# Patient Record
Sex: MALE | Race: WHITE | NOT HISPANIC OR LATINO | Employment: FULL TIME | URBAN - METROPOLITAN AREA
[De-identification: names, ages, dates, MRNs, and addresses within clinical notes are randomized per-mention and may not be internally consistent; named-entity substitution may affect disease eponyms.]

---

## 2023-06-21 PROBLEM — K52.832 FOCAL LYMPHOCYTIC COLITIS: Status: ACTIVE | Noted: 2023-06-21

## 2023-06-21 RX ORDER — HYOSCYAMINE SULFATE 0.12 MG/1
TABLET, ORALLY DISINTEGRATING ORAL
COMMUNITY
Start: 2021-09-08 | End: 2023-06-22

## 2023-06-21 RX ORDER — CHOLESTYRAMINE 4 G/9G
POWDER, FOR SUSPENSION ORAL 2 TIMES DAILY
Status: ON HOLD | COMMUNITY
Start: 2021-09-08 | End: 2023-12-04 | Stop reason: WASHOUT

## 2023-06-22 ENCOUNTER — OFFICE VISIT (OUTPATIENT)
Dept: PRIMARY CARE | Facility: CLINIC | Age: 68
End: 2023-06-22
Payer: COMMERCIAL

## 2023-06-22 VITALS
DIASTOLIC BLOOD PRESSURE: 72 MMHG | BODY MASS INDEX: 28.5 KG/M2 | WEIGHT: 210.13 LBS | HEART RATE: 54 BPM | TEMPERATURE: 97.7 F | SYSTOLIC BLOOD PRESSURE: 122 MMHG

## 2023-06-22 DIAGNOSIS — K52.832 FOCAL LYMPHOCYTIC COLITIS: ICD-10-CM

## 2023-06-22 DIAGNOSIS — K90.0 CELIAC DISEASE (HHS-HCC): ICD-10-CM

## 2023-06-22 DIAGNOSIS — E78.5 DYSLIPIDEMIA: Primary | ICD-10-CM

## 2023-06-22 DIAGNOSIS — Z01.84 IMMUNITY STATUS TESTING: ICD-10-CM

## 2023-06-22 PROCEDURE — 90471 IMMUNIZATION ADMIN: CPT | Performed by: INTERNAL MEDICINE

## 2023-06-22 PROCEDURE — 1160F RVW MEDS BY RX/DR IN RCRD: CPT | Performed by: INTERNAL MEDICINE

## 2023-06-22 PROCEDURE — 99214 OFFICE O/P EST MOD 30 MIN: CPT | Performed by: INTERNAL MEDICINE

## 2023-06-22 PROCEDURE — 90677 PCV20 VACCINE IM: CPT | Performed by: INTERNAL MEDICINE

## 2023-06-22 PROCEDURE — 1036F TOBACCO NON-USER: CPT | Performed by: INTERNAL MEDICINE

## 2023-06-22 PROCEDURE — 1159F MED LIST DOCD IN RCRD: CPT | Performed by: INTERNAL MEDICINE

## 2023-06-22 NOTE — ASSESSMENT & PLAN NOTE
Newly diagnosed, mostly adherent to gluten free diet. Encouraged adherence. Recommend iron level on next set of labs. Vitamin D levels wnl at last check.

## 2023-06-22 NOTE — PROGRESS NOTES
Subjective   Patient ID: Jones Howell is a 67 y.o. male who presents for NEW PT VISIT .  HPI  67-year-old male former patient of Dr. Akbar here for establishment of care,, last seen May 2022.    Has no significant complaints today.   PMHx:  -Microscopic colitis without improvement on mesalamine recommended trial of cholestyramine at last visit with improvement in symptoms.   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 3 children , lost son 2 years ago unexpectedly. No need for support.   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  Current Outpatient Medications   Medication Instructions    cholestyramine (Questran) 4 gram packet oral, 2 times daily        Objective     /72   Pulse 54   Temp 36.5 °C (97.7 °F)   Wt 95.3 kg (210 lb 2 oz)   BMI 28.50 kg/m²     Physical Exam  General: Alert and oriented, in no apparent distress   HEENT: No conjunctival erythema, no external facial lesions   Lungs: Breathing comfortably  Skin: No evidence of skin breakdown.  Neuro: AAO x 3, answering questions appropriately, no obvious cranial nerve deficits       Assessment/Plan   Problem List Items Addressed This Visit    None  Visit Diagnoses       Dyslipidemia    -  Primary  Extensively discussed, elevated risk mainly due to age. Interested in further risk stratification will obtain coronary artery calcium score     Relevant Orders    CT cardiac scoring wo IV contrast    Immunity status testing        Relevant Orders    Pneumococcal conjugate vaccine, 20-valent, adult (PREVNAR 20)        Assessment/Plan   Problem List Items Addressed  This Visit       Focal lymphocytic colitis    Current Assessment & Plan     Followed by GI at [facility], maintained on cholestyramine with improvement in symptoms.          Celiac disease    Current Assessment & Plan     Newly diagnosed, mostly adherent to gluten free diet. Encouraged adherence. Recommend iron level on next set of labs. Vitamin D levels wnl at last check.                 Health Maintenance   Cancer screening:   - Colonoscopy 9/17 repeat 10 years  - PSA 2/23   Immunizations  Prevnar 20 today, otherwise UTD   Followup 6 months for preventive care visit

## 2023-06-22 NOTE — ASSESSMENT & PLAN NOTE
Followed by GI at [facility], maintained on cholestyramine with improvement in symptoms.

## 2023-06-22 NOTE — PATIENT INSTRUCTIONS
It was a pleasure to see you today! Here is a list of things we have discussed and to follow up on:    Cholesterol - we are obtaining a coronary artery calcium score.   Followup in 6 months for a preventive care visit.

## 2023-10-11 PROBLEM — R07.89 ATYPICAL CHEST PAIN: Status: ACTIVE | Noted: 2023-10-11

## 2023-10-11 PROBLEM — H81.10 BENIGN POSITIONAL VERTIGO: Status: ACTIVE | Noted: 2023-10-11

## 2023-10-11 PROBLEM — D22.62 MELANOCYTIC NEVI OF LEFT UPPER LIMB, INCLUDING SHOULDER: Status: ACTIVE | Noted: 2018-05-15

## 2023-10-11 PROBLEM — D22.71 MELANOCYTIC NEVI OF RIGHT LOWER LIMB, INCLUDING HIP: Status: ACTIVE | Noted: 2018-05-15

## 2023-10-11 PROBLEM — L57.0 ACTINIC KERATOSIS: Status: ACTIVE | Noted: 2018-05-15

## 2023-10-11 PROBLEM — L98.9 SKIN LESION: Status: ACTIVE | Noted: 2023-10-11

## 2023-10-11 PROBLEM — E55.9 VITAMIN D DEFICIENCY: Status: ACTIVE | Noted: 2023-10-11

## 2023-10-11 PROBLEM — L81.9 HYPERPIGMENTED SKIN LESION: Status: ACTIVE | Noted: 2023-10-11

## 2023-10-11 PROBLEM — D22.39 MELANOCYTIC NEVI OF OTHER PARTS OF FACE: Status: ACTIVE | Noted: 2018-05-15

## 2023-10-11 PROBLEM — R42 VERTIGO: Status: ACTIVE | Noted: 2023-10-11

## 2023-10-11 PROBLEM — L81.4 OTHER MELANIN HYPERPIGMENTATION: Status: ACTIVE | Noted: 2018-05-15

## 2023-10-11 PROBLEM — E73.9 LACTOSE INTOLERANCE: Status: ACTIVE | Noted: 2023-02-06

## 2023-10-11 PROBLEM — D18.01 HEMANGIOMA OF SKIN AND SUBCUTANEOUS TISSUE: Status: ACTIVE | Noted: 2018-05-15

## 2023-10-11 PROBLEM — R19.5 LOOSE STOOLS: Status: ACTIVE | Noted: 2023-10-11

## 2023-10-11 PROBLEM — L82.1 OTHER SEBORRHEIC KERATOSIS: Status: ACTIVE | Noted: 2018-05-15

## 2023-10-11 PROBLEM — D22.5 MELANOCYTIC NEVI OF TRUNK: Status: ACTIVE | Noted: 2018-05-15

## 2023-10-11 PROBLEM — D22.72 MELANOCYTIC NEVI OF LEFT LOWER LIMB, INCLUDING HIP: Status: ACTIVE | Noted: 2018-05-15

## 2023-10-11 PROBLEM — N52.9 ED (ERECTILE DYSFUNCTION) OF ORGANIC ORIGIN: Status: ACTIVE | Noted: 2023-02-06

## 2023-10-11 PROBLEM — D23.71 OTHER BENIGN NEOPLASM OF SKIN OF RIGHT LOWER LIMB, INCLUDING HIP: Status: ACTIVE | Noted: 2018-05-15

## 2023-10-11 PROBLEM — H01.009 BLEPHARITIS: Status: ACTIVE | Noted: 2023-10-11

## 2023-10-11 PROBLEM — L57.9 SKIN CHANGES DUE TO CHRONIC EXPOSURE TO NONIONIZING RADIATION, UNSPECIFIED: Status: ACTIVE | Noted: 2018-05-15

## 2023-10-11 PROBLEM — K21.9 GERD (GASTROESOPHAGEAL REFLUX DISEASE): Status: ACTIVE | Noted: 2023-10-11

## 2023-10-11 PROBLEM — L23.7 CONTACT DERMATITIS DUE TO POISON IVY: Status: ACTIVE | Noted: 2023-10-11

## 2023-10-11 PROBLEM — D22.61 MELANOCYTIC NEVI OF RIGHT UPPER LIMB, INCLUDING SHOULDER: Status: ACTIVE | Noted: 2018-05-15

## 2023-10-11 PROBLEM — M54.30 SCIATICA: Status: ACTIVE | Noted: 2023-10-11

## 2023-10-11 RX ORDER — NEOMYCIN SULFATE, POLYMYXIN B SULFATE, BACITRACIN ZINC, HYDROCORTISONE 3.5; 10000; 400; 1 MG/G; [USP'U]/G; [USP'U]/G; MG/G
OINTMENT OPHTHALMIC 4 TIMES DAILY
COMMUNITY
Start: 2022-05-17 | End: 2023-11-13 | Stop reason: ALTCHOICE

## 2023-10-11 RX ORDER — MESALAMINE 1.2 G/1
2 TABLET, DELAYED RELEASE ORAL DAILY
COMMUNITY
Start: 2017-10-11 | End: 2023-11-13 | Stop reason: ALTCHOICE

## 2023-10-11 RX ORDER — SILDENAFIL 25 MG/1
1 TABLET, FILM COATED ORAL AS NEEDED
COMMUNITY
Start: 2023-02-06 | End: 2023-11-13 | Stop reason: ALTCHOICE

## 2023-10-11 RX ORDER — SODIUM, POTASSIUM,MAG SULFATES 17.5-3.13G
SOLUTION, RECONSTITUTED, ORAL ORAL
COMMUNITY
Start: 2022-10-31 | End: 2023-11-13 | Stop reason: ALTCHOICE

## 2023-10-12 ENCOUNTER — OFFICE VISIT (OUTPATIENT)
Dept: DERMATOLOGY | Facility: CLINIC | Age: 68
End: 2023-10-12
Payer: COMMERCIAL

## 2023-10-12 DIAGNOSIS — D48.5 NEOPLASM OF UNCERTAIN BEHAVIOR OF SKIN: Primary | ICD-10-CM

## 2023-10-12 DIAGNOSIS — L57.8 DIFFUSE PHOTODAMAGE OF SKIN: ICD-10-CM

## 2023-10-12 DIAGNOSIS — D22.5 MELANOCYTIC NEVUS OF TRUNK: ICD-10-CM

## 2023-10-12 DIAGNOSIS — D18.01 HEMANGIOMA OF SKIN: ICD-10-CM

## 2023-10-12 DIAGNOSIS — L73.9 FOLLICULITIS: ICD-10-CM

## 2023-10-12 DIAGNOSIS — L57.0 ACTINIC KERATOSIS: ICD-10-CM

## 2023-10-12 PROCEDURE — 1160F RVW MEDS BY RX/DR IN RCRD: CPT | Performed by: DERMATOLOGY

## 2023-10-12 PROCEDURE — 88305 TISSUE EXAM BY PATHOLOGIST: CPT

## 2023-10-12 PROCEDURE — 11103 TANGNTL BX SKIN EA SEP/ADDL: CPT | Performed by: DERMATOLOGY

## 2023-10-12 PROCEDURE — 11302 SHAVE SKIN LESION 1.1-2.0 CM: CPT | Performed by: DERMATOLOGY

## 2023-10-12 PROCEDURE — 99204 OFFICE O/P NEW MOD 45 MIN: CPT | Performed by: DERMATOLOGY

## 2023-10-12 PROCEDURE — 1159F MED LIST DOCD IN RCRD: CPT | Performed by: DERMATOLOGY

## 2023-10-12 PROCEDURE — 88305 TISSUE EXAM BY PATHOLOGIST: CPT | Performed by: DERMATOLOGY

## 2023-10-12 PROCEDURE — 11102 TANGNTL BX SKIN SINGLE LES: CPT | Performed by: DERMATOLOGY

## 2023-10-12 PROCEDURE — 17004 DESTROY PREMAL LESIONS 15/>: CPT | Performed by: DERMATOLOGY

## 2023-10-12 PROCEDURE — 1036F TOBACCO NON-USER: CPT | Performed by: DERMATOLOGY

## 2023-10-12 NOTE — PROGRESS NOTES
Subjective     Jones Howell is a 68 y.o. male who presents for the following: Suspicious Skin Lesion (On face, including right temple, for many months).  He notes the spots on his face are red, scaly, and sensitive around his right temple.    Review of Systems:  No other skin or systemic complaints other than what is documented elsewhere in the note.    The following portions of the chart were reviewed this encounter and updated as appropriate:       Skin Cancer History  No skin cancer on file.    Specialty Problems          Dermatology Problems    Actinic keratosis    Hemangioma of skin and subcutaneous tissue    Melanocytic nevi of left lower limb, including hip    Melanocytic nevi of left upper limb, including shoulder    Melanocytic nevi of other parts of face    Melanocytic nevi of right lower limb, including hip    Melanocytic nevi of right upper limb, including shoulder    Melanocytic nevi of trunk    Other benign neoplasm of skin of right lower limb, including hip    Other melanin hyperpigmentation    Other seborrheic keratosis    Skin changes due to chronic exposure to nonionizing radiation, unspecified    Hyperpigmented skin lesion    Skin lesion       Past Dermatologic / Past Relevant Medical History:    No history of atypical nevi or skin cancer    Family History:    No family history of melanoma or skin cancer    Social History:    The patient states he works as a  and will be traveling to [place] this weekend    Allergies:  Fentanyl and Lactose    Current Medications / CAM's:    Current Outpatient Medications:     cholestyramine (Questran) 4 gram packet, Take by mouth twice a day., Disp: , Rfl:     mesalamine (Lialda) 1.2 gram EC tablet, Take 2 tablets (2.4 g) by mouth once daily., Disp: , Rfl:     NAPROXEN ORAL, Naproxen, Disp: , Rfl:     neomycin-bacitracin-polymyxin-hydrocortisone (Cortisporin) 3.5-400-10,000 mg-unit/g-1% ophthalmic ointment, Apply to affected eye(s) 4  times a day., Disp: , Rfl:     sildenafil (Viagra) 25 mg tablet, Take 1 tablet (25 mg) by mouth if needed., Disp: , Rfl:     sodium,potassium,mag sulfates (Suprep) 17.5-3.13-1.6 gram recon soln solution, Use as directed., Disp: , Rfl:      Objective   Well appearing patient in no apparent distress; mood and affect are within normal limits.    A full examination was performed including scalp, face, eyes, ears, nose, lips, neck, chest, axillae, abdomen, back, bilateral upper extremities, and bilateral lower extremities. All findings within normal limits unless otherwise noted below.    Assessment/Plan   1. Neoplasm of uncertain behavior of skin (3)  Right [place] superior  1 cm erythematous, scaly papule           Lesion biopsy  Type of biopsy: tangential    Informed consent: discussed and consent obtained    Timeout: patient name, date of birth, surgical site, and procedure verified    Procedure prep:  Patient was prepped and draped  Anesthesia: the lesion was anesthetized in a standard fashion    Anesthetic:  1% lidocaine w/ epinephrine 1-100,000 local infiltration  Instrument used: DermaBlade    Hemostasis achieved with: aluminum chloride    Outcome: patient tolerated procedure well    Post-procedure details: sterile dressing applied and wound care instructions given    Dressing type: petrolatum and bandage      Specimen 1 - Dermatopathology- DERM LAB  Differential Diagnosis: AK vs SCCIS  Check Margins Yes/No?:    Comments:    Dermpath Lab: Routine Histopathology (formalin-fixed tissue)    Right Temple inferior  1 cm erythematous, scaly papule     Lesion biopsy  Type of biopsy: tangential    Informed consent: discussed and consent obtained    Timeout: patient name, date of birth, surgical site, and procedure verified    Procedure prep:  Patient was prepped and draped  Anesthesia: the lesion was anesthetized in a standard fashion    Anesthetic:  1% lidocaine w/ epinephrine 1-100,000 local infiltration  Instrument  used: DermaBlade    Hemostasis achieved with: aluminum chloride    Outcome: patient tolerated procedure well    Post-procedure details: sterile dressing applied and wound care instructions given    Dressing type: petrolatum and bandage      Specimen 2 - Dermatopathology- DERM LAB  Differential Diagnosis: AK v SCCIS  Check Margins Yes/No?:    Comments:    Dermpath Lab: Routine Histopathology (formalin-fixed tissue)    Left Lower Back  7 mm dark brown pigmented, asymmetric macule with an asymmetric pigment network and irregular borders           Shave removal    Lesion length (cm):  0.7  Margin per side (cm):  0.2  Lesion diameter (cm):  1.1  Informed consent: discussed and consent obtained    Timeout: patient name, date of birth, surgical site, and procedure verified    Procedure prep:  Patient was prepped and draped  Anesthesia: the lesion was anesthetized in a standard fashion    Anesthetic:  1% lidocaine w/ epinephrine 1-100,000 local infiltration  Instrument used: flexible razor blade    Hemostasis achieved with: aluminum chloride    Outcome: patient tolerated procedure well    Post-procedure details: sterile dressing applied and wound care instructions given    Dressing type: bandage and petrolatum      Specimen 3 - Dermatopathology- DERM LAB  Differential Diagnosis: DN  Check Margins Yes/No?:    Comments:    Dermpath Lab: Routine Histopathology (formalin-fixed tissue)    2. Actinic keratosis (17)  Head - Anterior (Face) (17)  Scattered on the patient's face, there are multiple erythematous, gritty, scaly macules     Actinic Keratoses -scattered on face.  The pre-cancerous nature of these lesions and treatment options were discussed with the patient today.  At this time, I recommend treatment with liquid nitrogen cryotherapy.  The patient expressed understanding, is in agreement with this plan, and wishes to proceed with cryotherapy today.    Destr of lesion - Head - Anterior (Face)  Complexity: simple     Destruction method: cryotherapy    Informed consent: discussed and consent obtained    Lesion destroyed using liquid nitrogen: Yes    Cryotherapy cycles:  1  Outcome: patient tolerated procedure well with no complications    Post-procedure details: wound care instructions given      3. Melanocytic nevus of trunk  Scattered on the patient's face, neck, trunk, and extremities, there are multiple small, round- to oval-shaped, brown-pigmented and pink-colored, symmetric, uniform-appearing macules and dome-shaped papules    Clinically benign- to slightly atypical-appearing nevi - the clinically benign- to slightly atypical-appearing nature of the remainder of the patient's nevi was discussed with the patient today.  None of the patient's nevi, with the exception of the one noted above, meet threshold for biopsy today.  I emphasized the importance of performing monthly self-skin exams using the ABCDs of monitoring moles, which were reviewed with the patient today and an informational hand-out provided.  I also emphasized the importance of sun avoidance and sun protection with daily sunscreen use.    4. Hemangioma of skin  Scattered on the patient's face, neck, trunk, and extremities, there are multiple small, round, cherry red- to purplish-colored, symmetric, uniform, vascular-appearing macules and papules    Cherry Angiomas - the benign nature of these vascular lesions was discussed with the patient today and reassurance provided.  No treatment is medically indicated for these lesions at this time.    5. Folliculitis  Mid Back  Scattered on the patient's back and bilateral arms and forearms, there are several follicular-based erythematous, inflammatory papules and pustules    Folliculitis -back and bilateral arms and forearms.  The bacterial nature of this condition and treatment options were discussed with the patient today.  At this time, I recommend topical antibiotic therapy with Clindamycin 1% lotion, which the  patient was instructed to apply twice daily to the affected areas or up to 3-4 times per day as needed for active lesions.  The risks, benefits, and side effects of this medication were discussed.  The patient expressed understanding and is in agreement with this plan.    6. Diffuse photodamage of skin  Photodistributed  Diffuse photodamage with actinic changes with telangiectasia and mottled pigmentation in sun-exposed areas.    Photodamage.  The signs and symptoms of skin cancer were reviewed and the patient was advised to practice sun protection and sun avoidance, use daily sunscreen, and perform regular self skin exams.  Sun protection was discussed, including avoiding the mid-day sun, wearing a sunscreen with SPF at least 50, and stressing the need for reapplication of sunscreen and applying more than they think they need.

## 2023-10-13 DIAGNOSIS — K90.0 CELIAC DISEASE (HHS-HCC): ICD-10-CM

## 2023-10-13 DIAGNOSIS — Z00.00 ENCOUNTER FOR PREVENTATIVE ADULT HEALTH CARE EXAMINATION: Primary | ICD-10-CM

## 2023-10-13 DIAGNOSIS — Z12.5 PROSTATE CANCER SCREENING: ICD-10-CM

## 2023-10-13 DIAGNOSIS — I25.10 ATHEROSCLEROSIS OF CORONARY ARTERY OF NATIVE HEART WITHOUT ANGINA PECTORIS, UNSPECIFIED VESSEL OR LESION TYPE: ICD-10-CM

## 2023-10-13 RX ORDER — ROSUVASTATIN CALCIUM 10 MG/1
10 TABLET, COATED ORAL DAILY
Qty: 30 TABLET | Refills: 11 | Status: SHIPPED | OUTPATIENT
Start: 2023-10-13 | End: 2023-11-13 | Stop reason: ALTCHOICE

## 2023-10-13 NOTE — PROGRESS NOTES
Elevated CAC score with atherosclerosis noted on coronary artery calcium score with significant number of calcifications. Discussed with the patient extensively over the phone and recommendation to initiate cholesterol lowering medication for further risk reduction. Will obtain baseline bloodwork and initiate rosuvastatin at bedtime. Potential side effects and management expectations reviewed. He denies any symptoms of CAD.   Dilated pulmonary arteries noted - will obtain TTE for further evaluation.   Refer to cardiology.

## 2023-10-16 LAB
LABORATORY COMMENT REPORT: NORMAL
PATH REPORT.FINAL DX SPEC: NORMAL
PATH REPORT.GROSS SPEC: NORMAL
PATH REPORT.MICROSCOPIC SPEC OTHER STN: NORMAL
PATH REPORT.RELEVANT HX SPEC: NORMAL
PATH REPORT.TOTAL CANCER: NORMAL

## 2023-10-19 ENCOUNTER — LAB (OUTPATIENT)
Dept: LAB | Facility: LAB | Age: 68
End: 2023-10-19
Payer: COMMERCIAL

## 2023-10-19 DIAGNOSIS — Z12.5 PROSTATE CANCER SCREENING: ICD-10-CM

## 2023-10-19 DIAGNOSIS — K90.0 CELIAC DISEASE (HHS-HCC): ICD-10-CM

## 2023-10-19 DIAGNOSIS — Z00.00 ENCOUNTER FOR PREVENTATIVE ADULT HEALTH CARE EXAMINATION: ICD-10-CM

## 2023-10-19 LAB
25(OH)D3 SERPL-MCNC: 29 NG/ML (ref 30–100)
ALBUMIN SERPL BCP-MCNC: 4.7 G/DL (ref 3.4–5)
ALP SERPL-CCNC: 50 U/L (ref 33–136)
ALT SERPL W P-5'-P-CCNC: 13 U/L (ref 10–52)
ANION GAP SERPL CALC-SCNC: 13 MMOL/L (ref 10–20)
AST SERPL W P-5'-P-CCNC: 26 U/L (ref 9–39)
BASOPHILS # BLD AUTO: 0.04 X10*3/UL (ref 0–0.1)
BASOPHILS NFR BLD AUTO: 0.7 %
BILIRUB SERPL-MCNC: 0.8 MG/DL (ref 0–1.2)
BUN SERPL-MCNC: 14 MG/DL (ref 6–23)
CALCIUM SERPL-MCNC: 9.4 MG/DL (ref 8.6–10.6)
CHLORIDE SERPL-SCNC: 105 MMOL/L (ref 98–107)
CHOLEST SERPL-MCNC: 210 MG/DL (ref 0–199)
CHOLESTEROL/HDL RATIO: 3.3
CO2 SERPL-SCNC: 27 MMOL/L (ref 21–32)
CREAT SERPL-MCNC: 1.03 MG/DL (ref 0.5–1.3)
EOSINOPHIL # BLD AUTO: 0.11 X10*3/UL (ref 0–0.7)
EOSINOPHIL NFR BLD AUTO: 1.9 %
ERYTHROCYTE [DISTWIDTH] IN BLOOD BY AUTOMATED COUNT: 13.2 % (ref 11.5–14.5)
EST. AVERAGE GLUCOSE BLD GHB EST-MCNC: 105 MG/DL
FERRITIN SERPL-MCNC: 211 NG/ML (ref 20–300)
GFR SERPL CREATININE-BSD FRML MDRD: 80 ML/MIN/1.73M*2
GLUCOSE SERPL-MCNC: 90 MG/DL (ref 74–99)
HBA1C MFR BLD: 5.3 %
HCT VFR BLD AUTO: 40.6 % (ref 41–52)
HDLC SERPL-MCNC: 64.5 MG/DL
HGB BLD-MCNC: 12.8 G/DL (ref 13.5–17.5)
IMM GRANULOCYTES # BLD AUTO: 0 X10*3/UL (ref 0–0.7)
IMM GRANULOCYTES NFR BLD AUTO: 0 % (ref 0–0.9)
IRON SATN MFR SERPL: 25 % (ref 25–45)
IRON SERPL-MCNC: 87 UG/DL (ref 35–150)
LDLC SERPL CALC-MCNC: 129 MG/DL
LYMPHOCYTES # BLD AUTO: 2.28 X10*3/UL (ref 1.2–4.8)
LYMPHOCYTES NFR BLD AUTO: 39.3 %
MCH RBC QN AUTO: 29.1 PG (ref 26–34)
MCHC RBC AUTO-ENTMCNC: 31.5 G/DL (ref 32–36)
MCV RBC AUTO: 92 FL (ref 80–100)
MONOCYTES # BLD AUTO: 0.54 X10*3/UL (ref 0.1–1)
MONOCYTES NFR BLD AUTO: 9.3 %
NEUTROPHILS # BLD AUTO: 2.83 X10*3/UL (ref 1.2–7.7)
NEUTROPHILS NFR BLD AUTO: 48.8 %
NON HDL CHOLESTEROL: 146 MG/DL (ref 0–149)
NRBC BLD-RTO: 0 /100 WBCS (ref 0–0)
PLATELET # BLD AUTO: 244 X10*3/UL (ref 150–450)
PMV BLD AUTO: 10.5 FL (ref 7.5–11.5)
POTASSIUM SERPL-SCNC: 4.2 MMOL/L (ref 3.5–5.3)
PROT SERPL-MCNC: 7.2 G/DL (ref 6.4–8.2)
PSA SERPL-MCNC: 0.81 NG/ML
RBC # BLD AUTO: 4.4 X10*6/UL (ref 4.5–5.9)
SODIUM SERPL-SCNC: 141 MMOL/L (ref 136–145)
T4 FREE SERPL-MCNC: 0.95 NG/DL (ref 0.78–1.48)
TIBC SERPL-MCNC: 349 UG/DL (ref 240–445)
TRIGL SERPL-MCNC: 81 MG/DL (ref 0–149)
TSH SERPL-ACNC: 6.04 MIU/L (ref 0.44–3.98)
UIBC SERPL-MCNC: 262 UG/DL (ref 110–370)
VIT B12 SERPL-MCNC: 338 PG/ML (ref 211–911)
VLDL: 16 MG/DL (ref 0–40)
WBC # BLD AUTO: 5.8 X10*3/UL (ref 4.4–11.3)

## 2023-10-19 PROCEDURE — 83550 IRON BINDING TEST: CPT

## 2023-10-19 PROCEDURE — 82728 ASSAY OF FERRITIN: CPT

## 2023-10-19 PROCEDURE — 85025 COMPLETE CBC W/AUTO DIFF WBC: CPT

## 2023-10-19 PROCEDURE — 82306 VITAMIN D 25 HYDROXY: CPT

## 2023-10-19 PROCEDURE — 80053 COMPREHEN METABOLIC PANEL: CPT

## 2023-10-19 PROCEDURE — 84439 ASSAY OF FREE THYROXINE: CPT

## 2023-10-19 PROCEDURE — 84443 ASSAY THYROID STIM HORMONE: CPT

## 2023-10-19 PROCEDURE — 83540 ASSAY OF IRON: CPT

## 2023-10-19 PROCEDURE — 36415 COLL VENOUS BLD VENIPUNCTURE: CPT

## 2023-10-19 PROCEDURE — 83036 HEMOGLOBIN GLYCOSYLATED A1C: CPT

## 2023-10-19 PROCEDURE — 84153 ASSAY OF PSA TOTAL: CPT

## 2023-10-19 PROCEDURE — 80061 LIPID PANEL: CPT

## 2023-10-19 PROCEDURE — 82607 VITAMIN B-12: CPT

## 2023-11-08 ENCOUNTER — HOSPITAL ENCOUNTER (OUTPATIENT)
Dept: CARDIOLOGY | Facility: CLINIC | Age: 68
Discharge: HOME | End: 2023-11-08
Payer: COMMERCIAL

## 2023-11-08 DIAGNOSIS — I25.10 ATHEROSCLEROSIS OF CORONARY ARTERY OF NATIVE HEART WITHOUT ANGINA PECTORIS, UNSPECIFIED VESSEL OR LESION TYPE: ICD-10-CM

## 2023-11-08 LAB
AORTIC VALVE MEAN GRADIENT: 1.7
AORTIC VALVE PEAK VELOCITY: 0.89
AV PEAK GRADIENT: 3.2
AVA (PEAK VEL): 4.47
AVA (VTI): 4.35
EJECTION FRACTION APICAL 4 CHAMBER: 57.1
EJECTION FRACTION: 52
LEFT ATRIUM VOLUME AREA LENGTH INDEX BSA: 39.8
LEFT VENTRICLE INTERNAL DIMENSION DIASTOLE: 5.46 (ref 3.5–6)
LEFT VENTRICULAR OUTFLOW TRACT DIAMETER: 2.48
MITRAL VALVE E/A RATIO: 1.21
MITRAL VALVE E/E' RATIO: 10
RIGHT VENTRICLE FREE WALL PEAK S': 10
TRICUSPID ANNULAR PLANE SYSTOLIC EXCURSION: 2.6

## 2023-11-08 PROCEDURE — 93306 TTE W/DOPPLER COMPLETE: CPT

## 2023-11-08 PROCEDURE — 93306 TTE W/DOPPLER COMPLETE: CPT | Performed by: INTERNAL MEDICINE

## 2023-11-13 ENCOUNTER — OFFICE VISIT (OUTPATIENT)
Dept: CARDIOLOGY | Facility: HOSPITAL | Age: 68
End: 2023-11-13
Payer: COMMERCIAL

## 2023-11-13 ENCOUNTER — HOSPITAL ENCOUNTER (OUTPATIENT)
Dept: CARDIOLOGY | Facility: HOSPITAL | Age: 68
Discharge: HOME | End: 2023-11-13
Payer: COMMERCIAL

## 2023-11-13 VITALS
SYSTOLIC BLOOD PRESSURE: 125 MMHG | HEART RATE: 58 BPM | WEIGHT: 208 LBS | HEIGHT: 72 IN | DIASTOLIC BLOOD PRESSURE: 75 MMHG | BODY MASS INDEX: 28.17 KG/M2

## 2023-11-13 DIAGNOSIS — Z01.89 ENCOUNTER FOR OTHER SPECIFIED SPECIAL EXAMINATIONS: ICD-10-CM

## 2023-11-13 DIAGNOSIS — E78.5 DYSLIPIDEMIA, GOAL LDL BELOW 70: ICD-10-CM

## 2023-11-13 DIAGNOSIS — I25.119 CORONARY ARTERY DISEASE WITH ANGINA PECTORIS, UNSPECIFIED VESSEL OR LESION TYPE, UNSPECIFIED WHETHER NATIVE OR TRANSPLANTED HEART (CMS-HCC): ICD-10-CM

## 2023-11-13 DIAGNOSIS — I77.810 ASCENDING AORTA DILATATION (CMS-HCC): ICD-10-CM

## 2023-11-13 DIAGNOSIS — I25.10 ATHEROSCLEROSIS OF CORONARY ARTERY OF NATIVE HEART WITHOUT ANGINA PECTORIS, UNSPECIFIED VESSEL OR LESION TYPE: ICD-10-CM

## 2023-11-13 DIAGNOSIS — I51.9 LV DYSFUNCTION: ICD-10-CM

## 2023-11-13 DIAGNOSIS — I25.119 CORONARY ARTERY DISEASE WITH ANGINA PECTORIS, UNSPECIFIED VESSEL OR LESION TYPE, UNSPECIFIED WHETHER NATIVE OR TRANSPLANTED HEART (CMS-HCC): Primary | ICD-10-CM

## 2023-11-13 LAB
EJECTION FRACTION APICAL 4 CHAMBER: 52.4
EJECTION FRACTION: 53
LEFT VENTRICLE INTERNAL DIMENSION DIASTOLE: 5.2 (ref 3.5–6)
MITRAL VALVE E/A RATIO: 1.38
MITRAL VALVE E/E' RATIO: 7.1

## 2023-11-13 PROCEDURE — 1160F RVW MEDS BY RX/DR IN RCRD: CPT | Performed by: INTERNAL MEDICINE

## 2023-11-13 PROCEDURE — 93005 ELECTROCARDIOGRAM TRACING: CPT | Performed by: INTERNAL MEDICINE

## 2023-11-13 PROCEDURE — 2500000004 HC RX 250 GENERAL PHARMACY W/ HCPCS (ALT 636 FOR OP/ED): Performed by: INTERNAL MEDICINE

## 2023-11-13 PROCEDURE — 99214 OFFICE O/P EST MOD 30 MIN: CPT | Performed by: INTERNAL MEDICINE

## 2023-11-13 PROCEDURE — 1159F MED LIST DOCD IN RCRD: CPT | Performed by: INTERNAL MEDICINE

## 2023-11-13 PROCEDURE — 93321 DOPPLER ECHO F-UP/LMTD STD: CPT | Performed by: INTERNAL MEDICINE

## 2023-11-13 PROCEDURE — 93010 ELECTROCARDIOGRAM REPORT: CPT | Performed by: INTERNAL MEDICINE

## 2023-11-13 PROCEDURE — 93325 DOPPLER ECHO COLOR FLOW MAPG: CPT

## 2023-11-13 PROCEDURE — 93321 DOPPLER ECHO F-UP/LMTD STD: CPT

## 2023-11-13 PROCEDURE — 1036F TOBACCO NON-USER: CPT | Performed by: INTERNAL MEDICINE

## 2023-11-13 PROCEDURE — 93308 TTE F-UP OR LMTD: CPT

## 2023-11-13 PROCEDURE — 93308 TTE F-UP OR LMTD: CPT | Performed by: INTERNAL MEDICINE

## 2023-11-13 PROCEDURE — 93325 DOPPLER ECHO COLOR FLOW MAPG: CPT | Performed by: INTERNAL MEDICINE

## 2023-11-13 PROCEDURE — 99204 OFFICE O/P NEW MOD 45 MIN: CPT | Performed by: INTERNAL MEDICINE

## 2023-11-13 RX ORDER — ROSUVASTATIN CALCIUM 20 MG/1
20 TABLET, COATED ORAL DAILY
Qty: 90 TABLET | Refills: 3 | Status: SHIPPED | OUTPATIENT
Start: 2023-11-13 | End: 2024-11-12

## 2023-11-13 RX ORDER — LOSARTAN POTASSIUM 25 MG/1
25 TABLET ORAL DAILY
Qty: 30 TABLET | Refills: 11 | Status: SHIPPED | OUTPATIENT
Start: 2023-11-13 | End: 2024-01-11 | Stop reason: ALTCHOICE

## 2023-11-13 RX ORDER — ROSUVASTATIN CALCIUM 20 MG/1
20 TABLET, COATED ORAL DAILY
Qty: 90 TABLET | Refills: 3
Start: 2023-11-13 | End: 2023-11-13 | Stop reason: SDUPTHER

## 2023-11-13 RX ORDER — NAPROXEN SODIUM 220 MG/1
81 TABLET, FILM COATED ORAL DAILY
Qty: 90 TABLET | Refills: 3
Start: 2023-11-13 | End: 2024-11-12

## 2023-11-13 RX ORDER — NITROGLYCERIN 0.4 MG/1
0.4 TABLET SUBLINGUAL EVERY 5 MIN PRN
Qty: 100 TABLET | Refills: 11 | Status: SHIPPED | OUTPATIENT
Start: 2023-11-13 | End: 2024-01-09 | Stop reason: HOSPADM

## 2023-11-13 RX ADMIN — PERFLUTREN 2 ML OF DILUTION: 6.52 INJECTION, SUSPENSION INTRAVENOUS at 10:16

## 2023-11-13 ASSESSMENT — ENCOUNTER SYMPTOMS
DEPRESSION: 0
OCCASIONAL FEELINGS OF UNSTEADINESS: 0
LOSS OF SENSATION IN FEET: 0

## 2023-11-13 ASSESSMENT — PATIENT HEALTH QUESTIONNAIRE - PHQ9
1. LITTLE INTEREST OR PLEASURE IN DOING THINGS: NOT AT ALL
2. FEELING DOWN, DEPRESSED OR HOPELESS: NOT AT ALL
SUM OF ALL RESPONSES TO PHQ9 QUESTIONS 1 AND 2: 0

## 2023-11-13 NOTE — PROGRESS NOTES
Primary Care Physician: Shey Nguyễn DO      Date of Visit: 11/13/2023  8:00 AM EST  Location of visit: [facility]   Type of Visit: New Patient       HPI / Summary:   Jones Howell is a very pleasant 68 y.o. male presenting to establish cardiac care for elevated coronary artery calcium score    He has no significant past medical history except borderline lipids and had recent CT for CAC 6/22/2023: total 775.15:  LM =o,  .62,  Lcx 3.56,  .97. Ascending aorta 3.7 c,  also mildly dilated R PA and main PA and L PA, raising concern for possibly elevated PA pressures.   CAD risk facotrs: no Fhx, no HTN, borderline lipids, never smoked and no DM.   Pt is active running 3-4 miles every 3-4 days, bikes and walks dog for 40 minutes daily without CP or SOB. Pt denies palpitations presyncope or syncope, denies PND or orthopnea LE edema. No CP or SOB at rest or on exertion.   Approx 2 weeks ago started taking rosuvastatin 10 mg daily . No myalgias. Does note some knee pain. ( Is a runner). Also started taking baby aspirin.   Cqardiac echo done 11/8/2023: technically difficult study , but LVEF 35-40% with regional wall motion abnormalities, but recommended limited repeat with use of echo-contrast due to sub-optimal endocardial definition.        ROS: Full 10 pt review of symptoms of negative unless discussed above.     Problems:   Patient Active Problem List   Diagnosis    Focal lymphocytic colitis    Celiac disease    Anisometropia    Atypical chest pain    Benign positional vertigo    Blepharitis    Contact dermatitis due to poison ivy    ED (erectile dysfunction) of organic origin    GERD (gastroesophageal reflux disease)    Hemangioma of skin and subcutaneous tissue    Hyperpigmented skin lesion    Lactose intolerance    Melanocytic nevi of trunk    Melanocytic nevi of other parts of face    Loose stools    Nuclear sclerotic cataract of both eyes    Other benign neoplasm of skin of right lower  limb, including hip    Other melanin hyperpigmentation    Actinic keratosis    Other seborrheic keratosis    Skin changes due to chronic exposure to nonionizing radiation, unspecified    Pseudophakia of right eye    Sciatica    Skin lesion    Vertigo    Vitamin D deficiency    Melanocytic nevi of right upper limb, including shoulder    Melanocytic nevi of left upper limb, including shoulder    Melanocytic nevi of right lower limb, including hip    Melanocytic nevi of left lower limb, including hip     Medical History:   History reviewed. No pertinent past medical history.  Surgical Hx:   History reviewed. No pertinent surgical history.   Social Hx:   Tobacco Use: Low Risk  (11/13/2023)    Patient History     Smoking Tobacco Use: Never     Smokeless Tobacco Use: Never     Passive Exposure: Not on file     Alcohol Use: Not on file     Family Hx:   Family History   Problem Relation Name Age of Onset    Alzheimer's disease Mother  80    Other (COVID) Father  90      Exam:   Vitals:   Vitals:    11/13/23 0819   BP: 125/75   BP Location: Left arm   Patient Position: Sitting   BP Cuff Size: Adult   Pulse: 58   Weight: 94.3 kg (208 lb)   Height: 1.829 m (6')     Wt Readings from Last 5 Encounters:   11/13/23 94.3 kg (208 lb)   06/22/23 95.3 kg (210 lb 2 oz)   11/22/21 97.1 kg (214 lb)   04/07/21 97.5 kg (215 lb)      Constitutional:       Appearance: Healthy appearance. Not in distress.   Pulmonary:      Effort: Pulmonary effort is normal.      Breath sounds: Normal breath sounds.   Cardiovascular:      PMI at left midclavicular line. Bradycardia present. Regular rhythm. S1 with normal intensity. S2 with normal intensity.       Murmurs: There is no murmur.   Pulses:     Intact distal pulses.   Edema:     Peripheral edema absent.   Neurological:      Mental Status: Alert and oriented to person, place, and time.       Labs:   Recent Labs     10/19/23  0814 11/24/21  0836 08/05/20  0826 03/27/18  1042   WBC 5.8 5.9 5.4 6.4    HGB 12.8* 12.8* 13.1* 13.3*   HCT 40.6* 40.3* 40.8* 41.8    285 242 254   MCV 92 91 89 88     Recent Labs     10/19/23  0814 21  0836 20  0826 19  1222 18  1042    144 141 141 139   K 4.2 4.9 4.4 4.1 4.1    107 104 105 104   BUN 14 13 14 12 12   CREATININE 1.03 0.94 1.13 0.86 0.96      Recent Labs     10/19/23  0814   HGBA1C 5.3   FERRITIN 211   TIBC 349   IRONSAT 25     RESUFAST(CHOL:1,HDL:1,LDLF:1,TRI)  Lab Results   Component Value Date    CHOL 210 (H) 10/19/2023    HDL 64.5 10/19/2023    LDLF 122 (H) 2021    TRIG 81 10/19/2023   LDL 10/19/2023: 129    Notable Studies: imaging personally reviewed and summarized by me below  EKG:  EKG today  sinus bradycardia at 58 bpm otherwise normal EKG.     CT for CAC  2023:    LM 0,  .62,  LCx 3.56,  .97,     Total 775.15     The visualized ascending thoracic aorta measures 3.7 cm in diameter.  The heart is normal in size. No pericardial effusion is present.     No gross evidence of mediastinal or hilar lymphadenopathy or masses  is identified. The visualized segments of the lungs are normally  expanded.     The main pulmonary artery, right and left pulmonary artery are  dilated measuring 3.0, 2.6 and 2.4 cm respectively. Correlate with  elevated pulmonary pressures.     The visualized subdiaphragmatic structures appear intact.     IMPRESSION:  1. Coronary artery calcium score of 775*. Three-vessel coronary  artery atherosclerosis.  2. The main pulmonary artery, right and left pulmonary artery are  dilated measuring 3.0, 2.6 and 2.4 cm respectively. Correlate with  elevated pulmonary pressures.    Cardiac echo 2024  PHYSICIAN INTERPRETATION:  Left Ventricle: The left ventricular systolic function is mildly to moderately decreased, with an estimated ejection fraction of 35-40%. Wall motion is abnormal. The left ventricular cavity size is normal. Spectral Doppler shows an abnormal pattern of left  ventricular diastolic filling. Echoconrast would have been helpful to better assess the apex and trabeculations. Note that the apical views used to calculate LVEF are foreshortened which will overestimate the LVEF. Consider repeat limited echo with use of echocontrast vs cardiac MRI to better assess LVEF and apical trabeculations. Overall mild to moderate LV systolic dysfunction wtih an LAD territory wall motion abnormality.  LV Wall Scoring:  The mid and apical anterior septum and apex are akinetic. The basal and mid  inferior septum, apical lateral segment, apical anterior segment, and apical  inferior segment are hypokinetic.     Left Atrium: The left atrium is mildly dilated.  Right Ventricle: The right ventricle is mildly enlarged. There is low normal right ventricular systolic function. Trabeculated RV.  Right Atrium: The right atrium is mildly dilated.  Aortic Valve: The aortic valve is trileaflet. There is no evidence of aortic valve regurgitation. The peak instantaneous gradient of the aortic valve is 3.2 mmHg. The mean gradient of the aortic valve is 1.7 mmHg.  Mitral Valve: The mitral valve is normal in structure. There is mild mitral annular calcification. There is trace mitral valve regurgitation. There is some mild bowing of the anterior mitral leaflet without kandace prolapse and only trivial MR.  Tricuspid Valve: The tricuspid valve is structurally normal. There is trace tricuspid regurgitation. The right ventricular systolic pressure is unable to be estimated.  Pulmonic Valve: The pulmonic valve is not well visualized. There is physiologic pulmonic valve regurgitation.  Pericardium: There is no pericardial effusion noted.  Aorta: The aortic root is normal. The aortic root is at the upper limits of normal size.  Systemic Veins: The inferior vena cava appears dilated. There is IVC inspiratory collapse greater than 50%.  In comparison to the previous echocardiogram(s): There are no prior studies on this  patient for comparison purposes. No prior echocardiogram available for comparison.        CONCLUSIONS:   1. Left ventricular systolic function is mildly to moderately decreased with a 35-40% estimated ejection fraction.   2. Multiple segmental abnormalities exist. See findings.   3. Echoconrast would have been helpful to better assess the apex and trabeculations. Note that the apical views used to calculate LVEF are foreshortened which will overestimate the LVEF. Consider repeat limited echo with use of echocontrast vs cardiac MRI to better assess LVEF and apical trabeculations. Overall mild to moderate LV systolic dysfunction wtih an LAD territory wall motion abnormality.   4. Spectral Doppler shows an abnormal pattern of left ventricular diastolic filling.   5. There is low normal right ventricular systolic function.   6. No prior echocardiogram available for comparison.      Limited repeat echo today with use of echo-contrast still technically difficult with foreshortening of apical views. There is trabeculation of the LV apex, but the non-contrast images still with some LAD territory wall motion abnormality and possible inferior hypokinesis. Overall at least mild regional LV systolic dysfunction.     Current Outpatient Medications   Medication Instructions    aspirin 81 mg, oral, Daily    cholestyramine (Questran) 4 gram packet oral, 2 times daily    NAPROXEN ORAL Naproxen    rosuvastatin (CRESTOR) 20 mg, oral, Daily     Impressions and Plan:    Pt is a very active 68 year old man without significant past medical history found recently to have a high CAC ( 775.15 )( largest amount in the LAD) and a recent echocardiogram with  reduced LV systolic function with apparent hypokinesis in the LAD territory ( and possible inferior wall hypokinesis as well. ) today's limites exam with echo contrast with only mildly reduced LV systolic function. CT scan also with  mildly dilated aorta, but -pt has normal BP and baseline  mild bradycardia, so will not add beta blocker at this time. He recently started aspirin 81 mg daily and rosuvastatin 10 mg daily. He is tolerating this well, however given the above findings should be on a high intensity statin so his rosuvastatin was increased to 20 mg daily. He is very active running etc and remains totally asymptomatic, but given abnormal LV systolic function with high calcium score will assess his coronaries further with invasive angiography +/- PCI. Will give bottle of SL NTG to use as needed ( reviewed instructions  for use with patient). Will start with losartan for LV dysfunction and consider adding further medications as tolerated.   Plan  High CAC- continue aspirin and rosuvastatin increased to 20 mg daily. And will give rx for SL NTG.   Lipids- target LDL < 70 ( unless needs CABG or PCI in which case goal would be < 55) will recheck in 3 months  High CAC with reduced LV systolic function- invasive angiography. Will also start low dose losartan 25 mg daily given LV dysfunction. Will check BNP to look for signs of congestion. May consider adding SGLT2i +/- kat in the future.      Patient Instructions:  If you have any questions or need cardiac medication refills, please call my office at [phone],      To reach my office please call [phone]  To schedule an appointment call [phone].          ____________________________________________________________  Michelle Jimenez MD  Division of Cardiovascular Medicine  [facility] [place]  [facility]

## 2023-11-13 NOTE — PATIENT INSTRUCTIONS
High coronary artery calcium score ( 775) with reduced LV systolic function- will proceed with cardiac cath +/- PCI with Dr XANDER Ma. To continue aspirin 81 mg daily and will increase rosuvastatin to 20 mg daily ( high intensity dosing) for LDL target < 70. Pt may take 2 of his 10 mg tablets. Pt to have lipid panel rechecked in 3 months. Will give rx for SL nitroglycerine to use as needed ( instructed) for chest pain  Reduced LV systolic function on recent echo ( 35-40%)  though technically difficult study- will do limited echo with use of echo contrast to reassess LV function and look at regional wall motion. On repeat echo with echo-contrast possibly only mildly reduced LV function. Will add losartan 25 mg daily.   Mildly dilated aorta- BP appears normal and HR baseline 58 bpm. No room for additional beta blockers.   Will order precath labs  Return to clinic in  6 weeks.

## 2023-11-14 ENCOUNTER — PREP FOR PROCEDURE (OUTPATIENT)
Dept: CARDIOLOGY | Facility: HOSPITAL | Age: 68
End: 2023-11-14
Payer: COMMERCIAL

## 2023-11-14 DIAGNOSIS — I25.119 CORONARY ARTERY DISEASE WITH ANGINA PECTORIS, UNSPECIFIED VESSEL OR LESION TYPE, UNSPECIFIED WHETHER NATIVE OR TRANSPLANTED HEART (CMS-HCC): Primary | ICD-10-CM

## 2023-11-14 DIAGNOSIS — I51.9 LV DYSFUNCTION: ICD-10-CM

## 2023-11-14 LAB
ATRIAL RATE: 58 BPM
P AXIS: 67 DEGREES
P OFFSET: 186 MS
P ONSET: 125 MS
PR INTERVAL: 196 MS
Q ONSET: 223 MS
QRS COUNT: 10 BEATS
QRS DURATION: 82 MS
QT INTERVAL: 416 MS
QTC CALCULATION(BAZETT): 408 MS
QTC FREDERICIA: 411 MS
R AXIS: 71 DEGREES
T AXIS: 55 DEGREES
T OFFSET: 431 MS
VENTRICULAR RATE: 58 BPM

## 2023-11-14 RX ORDER — SODIUM CHLORIDE 9 MG/ML
100 INJECTION, SOLUTION INTRAVENOUS CONTINUOUS
Status: CANCELLED | OUTPATIENT
Start: 2023-11-14

## 2023-11-20 ENCOUNTER — OFFICE VISIT (OUTPATIENT)
Dept: DERMATOLOGY | Facility: CLINIC | Age: 68
End: 2023-11-20
Payer: COMMERCIAL

## 2023-11-20 DIAGNOSIS — D22.5 MELANOCYTIC NEVUS OF TRUNK: ICD-10-CM

## 2023-11-20 DIAGNOSIS — D48.5 NEOPLASM OF UNCERTAIN BEHAVIOR OF SKIN: ICD-10-CM

## 2023-11-20 DIAGNOSIS — L57.0 ACTINIC KERATOSIS: Primary | ICD-10-CM

## 2023-11-20 DIAGNOSIS — L81.4 LENTIGO: ICD-10-CM

## 2023-11-20 DIAGNOSIS — Z86.018 HISTORY OF DYSPLASTIC NEVUS: ICD-10-CM

## 2023-11-20 DIAGNOSIS — L82.1 SEBORRHEIC KERATOSIS: ICD-10-CM

## 2023-11-20 DIAGNOSIS — L82.0 INFLAMED SEBORRHEIC KERATOSIS: ICD-10-CM

## 2023-11-20 PROCEDURE — 88305 TISSUE EXAM BY PATHOLOGIST: CPT | Mod: TC,DER | Performed by: DERMATOLOGY

## 2023-11-20 PROCEDURE — 88305 TISSUE EXAM BY PATHOLOGIST: CPT | Performed by: DERMATOLOGY

## 2023-11-20 PROCEDURE — 11302 SHAVE SKIN LESION 1.1-2.0 CM: CPT | Performed by: DERMATOLOGY

## 2023-11-20 PROCEDURE — 1160F RVW MEDS BY RX/DR IN RCRD: CPT | Performed by: DERMATOLOGY

## 2023-11-20 PROCEDURE — 99213 OFFICE O/P EST LOW 20 MIN: CPT | Performed by: DERMATOLOGY

## 2023-11-20 PROCEDURE — 1159F MED LIST DOCD IN RCRD: CPT | Performed by: DERMATOLOGY

## 2023-11-20 PROCEDURE — 1036F TOBACCO NON-USER: CPT | Performed by: DERMATOLOGY

## 2023-11-20 PROCEDURE — 17004 DESTROY PREMAL LESIONS 15/>: CPT | Performed by: DERMATOLOGY

## 2023-11-20 PROCEDURE — 17110 DESTRUCTION B9 LES UP TO 14: CPT | Performed by: DERMATOLOGY

## 2023-11-20 RX ORDER — MULTIVITAMIN/IRON/FOLIC ACID 18MG-0.4MG
1 TABLET ORAL DAILY
COMMUNITY

## 2023-11-20 ASSESSMENT — DERMATOLOGY PATIENT ASSESSMENT
DO YOU USE SUNSCREEN: OCCASIONALLY
DO YOU HAVE ANY NEW OR CHANGING LESIONS: NO
DO YOU USE A TANNING BED: NO

## 2023-11-20 ASSESSMENT — ITCH NUMERIC RATING SCALE: HOW SEVERE IS YOUR ITCHING?: 0

## 2023-11-22 NOTE — PROGRESS NOTES
"Florentin Howell \"Samir\" is a 68 y.o. male who presents for the following: Discuss recent biopsy results and management options.  Biopsy of 3 suspicious lesions performed at his initial visit in our office on 10/12/23 revealed actinic keratoses on his right temple superior and right temple inferior and a moderately dysplastic compound nevus on his left lower back.    Today, the patient states the biopsy sites healed well.  Since his last visit, he notes a raised, scaly, itchy bump on his left upper forehead, which has not changed in any other way, including in size or color, and does not hurt or bleed.  He denies any other new, changing, symptomatic, or concerning skin lesions.      Review of Systems:  No other skin or systemic complaints other than what is documented elsewhere in the note.    The following portions of the chart were reviewed this encounter and updated as appropriate:       Skin Cancer History  No skin cancer on file.    Specialty Problems          Dermatology Problems    Actinic keratosis    Hemangioma of skin and subcutaneous tissue    Melanocytic nevi of left lower limb, including hip    Melanocytic nevi of left upper limb, including shoulder    Melanocytic nevi of other parts of face    Melanocytic nevi of right lower limb, including hip    Melanocytic nevi of right upper limb, including shoulder    Melanocytic nevi of trunk    Other benign neoplasm of skin of right lower limb, including hip    Other melanin hyperpigmentation    Other seborrheic keratosis    Skin changes due to chronic exposure to nonionizing radiation, unspecified    Hyperpigmented skin lesion    Skin lesion       Past Dermatologic / Past Relevant Medical History:    - history of AKs  - moderately dysplastic compound nevus on left lower back diagnosed on 10/12/23 s/p re-shave on 11/20/23  - no h/o skin cancer    Family History:    No family history of melanoma or skin cancer    Social History:    The patient " "states he works as a  and has 3 children and is currently expecting his 2nd grandchild; he goes by \"Samir\"    Allergies:  Fentanyl and Lactose    Current Medications / CAM's:    Current Outpatient Medications:     aspirin 81 mg chewable tablet, Chew 1 tablet (81 mg) once daily., Disp: 90 tablet, Rfl: 3    b complex 0.4 mg tablet, Take 1 tablet by mouth once daily., Disp: , Rfl:     cholestyramine (Questran) 4 gram packet, Take by mouth twice a day., Disp: , Rfl:     losartan (Cozaar) 25 mg tablet, Take 1 tablet (25 mg) by mouth once daily., Disp: 30 tablet, Rfl: 11    NAPROXEN ORAL, Naproxen, Disp: , Rfl:     nitroglycerin (Nitrostat) 0.4 mg SL tablet, Place 1 tablet (0.4 mg) under the tongue every 5 minutes if needed for chest pain. May repeat dose every 5 minutes for up to 3 doses total., Disp: 100 tablet, Rfl: 11    rosuvastatin (Crestor) 20 mg tablet, Take 1 tablet (20 mg) by mouth once daily., Disp: 90 tablet, Rfl: 3     Objective   Well appearing patient in no apparent distress; mood and affect are within normal limits.    A waist-up examination was performed including scalp, face, eyes, ears, nose, lips, neck, chest, axillae, abdomen, back, and bilateral upper extremities. All findings within normal limits unless otherwise noted below.        Assessment/Plan   1. Actinic keratosis (17)  On the patient's right temple superior and right temple inferior, there are pink, well-healed scars at the recent biopsy sites each with a surrounding rim of erythema, grittiness, and scale; scattered on the patient's face, there are multiple erythematous, gritty, scaly macules    Biopsy-proven Actinic Keratoses and additional AKs -right temple superior and right temple inferior, both present on the deep and peripheral margins, and scattered on face, respectively.  The pre-cancerous nature of these lesions and treatment options were discussed with the patient today.  At this time, I recommend treatment with " liquid nitrogen cryotherapy.  The patient expressed understanding, is in agreement with this plan, and wishes to proceed with cryotherapy today.    Destr of lesion  Complexity: simple    Destruction method: cryotherapy    Informed consent: discussed and consent obtained    Lesion destroyed using liquid nitrogen: Yes    Cryotherapy cycles:  1  Outcome: patient tolerated procedure well with no complications    Post-procedure details: wound care instructions given      2. Neoplasm of uncertain behavior of skin (2)  Left Lower Back  Pink scar at recent biopsy site          Shave removal    Lesion length (cm):  1.1  Margin per side (cm):  0  Lesion diameter (cm):  1.1  Informed consent: discussed and consent obtained    Timeout: patient name, date of birth, surgical site, and procedure verified    Procedure prep:  Patient was prepped and draped  Anesthesia: the lesion was anesthetized in a standard fashion    Anesthetic:  1% lidocaine w/ epinephrine 1-100,000 local infiltration  Instrument used: flexible razor blade    Hemostasis achieved with: aluminum chloride    Outcome: patient tolerated procedure well    Post-procedure details: sterile dressing applied and wound care instructions given    Dressing type: bandage and petrolatum      Staff Communication: Dermatology Local Anesthesia: 1 % Lidocaine / Epinephrine - Amount:0.5ml    Specimen 1 - Dermatopathology- DERM LAB  Differential Diagnosis: DN; re-shave  Check Margins Yes/No?:    Comments:    Dermpath Lab: Routine Histopathology (formalin-fixed tissue)    Left Lower Back Medial to scar  7 mm dark brown pigmented, asymmetric macule with an asymmetric pigment network and irregular borders           Shave removal    Lesion length (cm):  0.7  Margin per side (cm):  0.2  Lesion diameter (cm):  1.1  Informed consent: discussed and consent obtained    Timeout: patient name, date of birth, surgical site, and procedure verified    Procedure prep:  Patient was prepped and  draped  Anesthesia: the lesion was anesthetized in a standard fashion    Anesthetic:  1% lidocaine w/ epinephrine 1-100,000 local infiltration  Instrument used: flexible razor blade    Hemostasis achieved with: aluminum chloride    Outcome: patient tolerated procedure well    Post-procedure details: sterile dressing applied and wound care instructions given    Dressing type: bandage and petrolatum      Staff Communication: Dermatology Local Anesthesia: 1 % Lidocaine / Epinephrine - Amount:0.5ml    Specimen 2 - Dermatopathology- DERM LAB  Differential Diagnosis: DN v SK  Check Margins Yes/No?:    Comments:    Dermpath Lab: Routine Histopathology (formalin-fixed tissue)    Related Procedures  Follow Up In Dermatology    3. Inflamed seborrheic keratosis  Head - Anterior (Face)  On the patient's left upper forehead, there is a 8 mm erythematous and light brown-colored, hyperkeratotic, stuck-on appearing papule with a surrounding rim of erythema    Inflamed Seborrheic Keratosis -left upper forehead.  The benign nature of this lesion was discussed with the patient today and reassurance provided.  Given the history the patient provides of frequent irritation and associated symptoms as well as its inflamed appearance on exam today, I offered to treat this lesion with liquid nitrogen cryotherapy.  The patient expressed understanding, is in agreement with this plan, and wishes to proceed with cryotherapy today.    Destr of lesion - Head - Anterior (Face)  Complexity: simple    Destruction method: cryotherapy    Informed consent: discussed and consent obtained    Lesion destroyed using liquid nitrogen: Yes    Cryotherapy cycles:  2  Outcome: patient tolerated procedure well with no complications    Post-procedure details: wound care instructions given      4. Melanocytic nevus of trunk  Scattered on the patient's face, neck, trunk, and bilateral upper extremities, there are multiple small, round- to oval-shaped,  brown-pigmented and pink-colored, symmetric, uniform-appearing macules and dome-shaped papules    Clinically benign- to slightly atypical-appearing nevi - the clinically benign- to slightly atypical-appearing nature of the remainder of the patient's nevi was discussed with the patient today.  None of the patient's nevi, with the exception of the one noted above, meet threshold for biopsy today.  I emphasized the importance of performing monthly self-skin exams using the ABCDs of monitoring moles, which were reviewed with the patient today and an informational hand-out provided.  I also emphasized the importance of sun avoidance and sun protection with daily sunscreen use.    5. Seborrheic keratosis  Scattered on the patient's face, neck, trunk, and bilateral upper extremities, there are multiple tan- to light brown-colored, hyperkeratotic, stuck-on appearing papules of varying size and shape    Seborrheic Keratoses - the benign nature of these lesions was discussed with the patient today and reassurance provided.  No treatment is medically indicated for these lesions at this time.    6. Lentigo  Photodistributed  Multiple tan- to light brown-colored, round- to oval-shaped, symmetric and uniform-appearing macules and small patches consistent with lentigines scattered in sun-exposed areas.    Solar Lentigines and photodamage.  The clinically benign-appearing nature of these lesions and their relation to chronic sun exposure were discussed with the patient today and reassurance provided.  None of these lesions meet threshold for biopsy today, and thus no treatment is medically indicated for these lesions at this time.  The signs and symptoms of skin cancer were reviewed and the patient was advised to practice sun protection and sun avoidance, use daily sunscreen, and perform regular self skin exams.  The patient was instructed to monitor these lesions for any changes, such as in size, shape, or color, or associated  symptoms and to call our office to schedule a return visit for re-evaluation if any such changes or symptoms are noticed in the future.  The patient expressed understanding and is in agreement with this plan.    7. History of dysplastic nevus  There is evidence of photodamage in sun exposed areas.    History of dysplastic nevus and actinic keratoses and photodamage.  I emphasized the importance of continuing to perform monthly self-skin exams using the ABCDs of monitoring moles, which were reviewed with the patient, as well as the importance of sun avoidance and sun protection with daily sunscreen use.  I will have the patient return to our office in 6 to 12 months, pending the above biopsy result, for routine follow-up and skin exam, and the patient was instructed to call our office should the patient notice any new, changing, symptomatic, or otherwise concerning skin lesions before then.  The patient expressed understanding and is in agreement with this plan.

## 2023-11-30 ENCOUNTER — TELEPHONE (OUTPATIENT)
Dept: CARDIOLOGY | Facility: HOSPITAL | Age: 68
End: 2023-11-30

## 2023-11-30 ENCOUNTER — LAB (OUTPATIENT)
Dept: LAB | Facility: LAB | Age: 68
End: 2023-11-30
Payer: COMMERCIAL

## 2023-11-30 DIAGNOSIS — I25.119 CORONARY ARTERY DISEASE WITH ANGINA PECTORIS, UNSPECIFIED VESSEL OR LESION TYPE, UNSPECIFIED WHETHER NATIVE OR TRANSPLANTED HEART (CMS-HCC): Primary | ICD-10-CM

## 2023-11-30 DIAGNOSIS — I25.119 CORONARY ARTERY DISEASE WITH ANGINA PECTORIS, UNSPECIFIED VESSEL OR LESION TYPE, UNSPECIFIED WHETHER NATIVE OR TRANSPLANTED HEART (CMS-HCC): ICD-10-CM

## 2023-11-30 DIAGNOSIS — I51.9 LV DYSFUNCTION: ICD-10-CM

## 2023-11-30 LAB
ANION GAP SERPL CALC-SCNC: 11 MMOL/L (ref 10–20)
BNP SERPL-MCNC: 17 PG/ML (ref 0–99)
BUN SERPL-MCNC: 15 MG/DL (ref 6–23)
CALCIUM SERPL-MCNC: 9.5 MG/DL (ref 8.6–10.6)
CHLORIDE SERPL-SCNC: 105 MMOL/L (ref 98–107)
CO2 SERPL-SCNC: 30 MMOL/L (ref 21–32)
CREAT SERPL-MCNC: 1.03 MG/DL (ref 0.5–1.3)
ERYTHROCYTE [DISTWIDTH] IN BLOOD BY AUTOMATED COUNT: 12.7 % (ref 11.5–14.5)
GFR SERPL CREATININE-BSD FRML MDRD: 80 ML/MIN/1.73M*2
GLUCOSE SERPL-MCNC: 85 MG/DL (ref 74–99)
HCT VFR BLD AUTO: 40 % (ref 41–52)
HGB BLD-MCNC: 13.1 G/DL (ref 13.5–17.5)
MCH RBC QN AUTO: 29 PG (ref 26–34)
MCHC RBC AUTO-ENTMCNC: 32.8 G/DL (ref 32–36)
MCV RBC AUTO: 89 FL (ref 80–100)
NRBC BLD-RTO: 0 /100 WBCS (ref 0–0)
PLATELET # BLD AUTO: 261 X10*3/UL (ref 150–450)
POTASSIUM SERPL-SCNC: 4.2 MMOL/L (ref 3.5–5.3)
RBC # BLD AUTO: 4.52 X10*6/UL (ref 4.5–5.9)
SODIUM SERPL-SCNC: 142 MMOL/L (ref 136–145)
WBC # BLD AUTO: 5.7 X10*3/UL (ref 4.4–11.3)

## 2023-11-30 PROCEDURE — 80048 BASIC METABOLIC PNL TOTAL CA: CPT

## 2023-11-30 PROCEDURE — 85027 COMPLETE CBC AUTOMATED: CPT

## 2023-11-30 PROCEDURE — 83880 ASSAY OF NATRIURETIC PEPTIDE: CPT

## 2023-11-30 PROCEDURE — 36415 COLL VENOUS BLD VENIPUNCTURE: CPT

## 2023-11-30 NOTE — TELEPHONE ENCOUNTER
Pre heart cath instructions per Dr. Ma,     Check CBC, BMP 2-3 days prior,   Medications ok.     LM providing instructions. Encouraged confirmation return call.

## 2023-12-04 ENCOUNTER — HOSPITAL ENCOUNTER (OUTPATIENT)
Facility: HOSPITAL | Age: 68
Setting detail: OUTPATIENT SURGERY
Discharge: HOME | End: 2023-12-04
Attending: INTERNAL MEDICINE | Admitting: INTERNAL MEDICINE
Payer: COMMERCIAL

## 2023-12-04 VITALS
HEART RATE: 56 BPM | SYSTOLIC BLOOD PRESSURE: 132 MMHG | TEMPERATURE: 97.7 F | OXYGEN SATURATION: 96 % | DIASTOLIC BLOOD PRESSURE: 84 MMHG | RESPIRATION RATE: 15 BRPM

## 2023-12-04 DIAGNOSIS — I25.119 CORONARY ARTERY DISEASE WITH ANGINA PECTORIS, UNSPECIFIED VESSEL OR LESION TYPE, UNSPECIFIED WHETHER NATIVE OR TRANSPLANTED HEART (CMS-HCC): ICD-10-CM

## 2023-12-04 DIAGNOSIS — I25.10 ATHEROSCLEROTIC HEART DISEASE OF NATIVE CORONARY ARTERY WITHOUT ANGINA PECTORIS: ICD-10-CM

## 2023-12-04 DIAGNOSIS — I25.5 ISCHEMIC CARDIOMYOPATHY: ICD-10-CM

## 2023-12-04 DIAGNOSIS — I51.9 LV DYSFUNCTION: ICD-10-CM

## 2023-12-04 PROCEDURE — 2500000001 HC RX 250 WO HCPCS SELF ADMINISTERED DRUGS (ALT 637 FOR MEDICARE OP): Performed by: INTERNAL MEDICINE

## 2023-12-04 PROCEDURE — 99152 MOD SED SAME PHYS/QHP 5/>YRS: CPT | Performed by: INTERNAL MEDICINE

## 2023-12-04 PROCEDURE — 93458 L HRT ARTERY/VENTRICLE ANGIO: CPT | Performed by: INTERNAL MEDICINE

## 2023-12-04 PROCEDURE — 2500000005 HC RX 250 GENERAL PHARMACY W/O HCPCS: Performed by: INTERNAL MEDICINE

## 2023-12-04 PROCEDURE — 99153 MOD SED SAME PHYS/QHP EA: CPT | Performed by: INTERNAL MEDICINE

## 2023-12-04 PROCEDURE — 2550000001 HC RX 255 CONTRASTS: Performed by: INTERNAL MEDICINE

## 2023-12-04 PROCEDURE — 94760 N-INVAS EAR/PLS OXIMETRY 1: CPT

## 2023-12-04 PROCEDURE — 2500000004 HC RX 250 GENERAL PHARMACY W/ HCPCS (ALT 636 FOR OP/ED): Performed by: INTERNAL MEDICINE

## 2023-12-04 PROCEDURE — 2720000007 HC OR 272 NO HCPCS: Performed by: INTERNAL MEDICINE

## 2023-12-04 PROCEDURE — C1894 INTRO/SHEATH, NON-LASER: HCPCS | Performed by: INTERNAL MEDICINE

## 2023-12-04 RX ORDER — SODIUM CHLORIDE 9 MG/ML
1.5 INJECTION, SOLUTION INTRAVENOUS CONTINUOUS
Status: DISCONTINUED | OUTPATIENT
Start: 2023-12-04 | End: 2023-12-04 | Stop reason: HOSPADM

## 2023-12-04 RX ORDER — SODIUM CHLORIDE 9 MG/ML
100 INJECTION, SOLUTION INTRAVENOUS CONTINUOUS
Status: DISCONTINUED | OUTPATIENT
Start: 2023-12-04 | End: 2023-12-04 | Stop reason: HOSPADM

## 2023-12-04 RX ORDER — HEPARIN SODIUM 1000 [USP'U]/ML
INJECTION, SOLUTION INTRAVENOUS; SUBCUTANEOUS AS NEEDED
Status: DISCONTINUED | OUTPATIENT
Start: 2023-12-04 | End: 2023-12-04 | Stop reason: HOSPADM

## 2023-12-04 RX ORDER — ACETAMINOPHEN 325 MG/1
650 TABLET ORAL EVERY 6 HOURS PRN
Status: DISCONTINUED | OUTPATIENT
Start: 2023-12-04 | End: 2023-12-04 | Stop reason: HOSPADM

## 2023-12-04 RX ORDER — ACETAMINOPHEN 650 MG/1
650 SUPPOSITORY RECTAL EVERY 6 HOURS PRN
Status: DISCONTINUED | OUTPATIENT
Start: 2023-12-04 | End: 2023-12-04 | Stop reason: HOSPADM

## 2023-12-04 RX ORDER — ACETAMINOPHEN 160 MG/5ML
650 SOLUTION ORAL EVERY 6 HOURS PRN
Status: DISCONTINUED | OUTPATIENT
Start: 2023-12-04 | End: 2023-12-04 | Stop reason: HOSPADM

## 2023-12-04 RX ORDER — LIDOCAINE HYDROCHLORIDE 20 MG/ML
INJECTION, SOLUTION INFILTRATION; PERINEURAL AS NEEDED
Status: DISCONTINUED | OUTPATIENT
Start: 2023-12-04 | End: 2023-12-04 | Stop reason: HOSPADM

## 2023-12-04 RX ORDER — NAPROXEN SODIUM 220 MG/1
81 TABLET, FILM COATED ORAL DAILY
Status: DISCONTINUED | OUTPATIENT
Start: 2023-12-04 | End: 2023-12-04 | Stop reason: HOSPADM

## 2023-12-04 RX ORDER — VERAPAMIL HYDROCHLORIDE 2.5 MG/ML
INJECTION, SOLUTION INTRAVENOUS AS NEEDED
Status: DISCONTINUED | OUTPATIENT
Start: 2023-12-04 | End: 2023-12-04 | Stop reason: HOSPADM

## 2023-12-04 RX ORDER — MIDAZOLAM HYDROCHLORIDE 1 MG/ML
INJECTION INTRAMUSCULAR; INTRAVENOUS AS NEEDED
Status: DISCONTINUED | OUTPATIENT
Start: 2023-12-04 | End: 2023-12-04 | Stop reason: HOSPADM

## 2023-12-04 RX ORDER — IBUPROFEN 800 MG/1
600 TABLET ORAL EVERY 8 HOURS PRN
COMMUNITY
Start: 2023-09-14 | End: 2024-01-09 | Stop reason: HOSPADM

## 2023-12-04 RX ADMIN — ASPIRIN 81 MG CHEWABLE TABLET 81 MG: 81 TABLET CHEWABLE at 08:40

## 2023-12-04 RX ADMIN — SODIUM CHLORIDE 100 ML/HR: 9 INJECTION, SOLUTION INTRAVENOUS at 08:42

## 2023-12-04 ASSESSMENT — ENCOUNTER SYMPTOMS
RESPIRATORY NEGATIVE: 1
CARDIOVASCULAR NEGATIVE: 1
EYES NEGATIVE: 1
GASTROINTESTINAL NEGATIVE: 1
CONSTITUTIONAL NEGATIVE: 1
MUSCULOSKELETAL NEGATIVE: 1
PSYCHIATRIC NEGATIVE: 1
NEUROLOGICAL NEGATIVE: 1

## 2023-12-04 ASSESSMENT — PAIN - FUNCTIONAL ASSESSMENT
PAIN_FUNCTIONAL_ASSESSMENT: 0-10

## 2023-12-04 ASSESSMENT — PAIN SCALES - GENERAL
PAINLEVEL_OUTOF10: 0 - NO PAIN

## 2023-12-04 ASSESSMENT — COLUMBIA-SUICIDE SEVERITY RATING SCALE - C-SSRS
1. IN THE PAST MONTH, HAVE YOU WISHED YOU WERE DEAD OR WISHED YOU COULD GO TO SLEEP AND NOT WAKE UP?: NO
6. HAVE YOU EVER DONE ANYTHING, STARTED TO DO ANYTHING, OR PREPARED TO DO ANYTHING TO END YOUR LIFE?: NO
2. HAVE YOU ACTUALLY HAD ANY THOUGHTS OF KILLING YOURSELF?: NO

## 2023-12-04 NOTE — RESULT ENCOUNTER NOTE
Reviewed cath films with Dr Ma and Dr Juan A Jimenez. Consensus is that given his age and activity level and extent of disease would best be served by CABG with arterial grafting. Pt will be seen in consultation by MARY GRACE Jimenez this thursday [] No, pt required further clarification. Post Treatment Pain:  0/10    Plan  Times per week: 1-2x/week   Plan weeks: 12 weeks       Goals  (Total # of Visits to Date: 43)   Short Term Goals - Time Frame for Short term goals: 2 months     Short term goal 1: Initiate HEP with good understanding-met                                        [x]Met   []Partially met  []Not met   Short term goal 2: Patient will demonstrate the ability to sit independently for 2 minutes while playing with toy in order to progress age appropriate milestones. -met  [x]Met   []Partially met  []Not met      []Met   []Partially met  []Not met      []Met   []Partially met  []Not met     Long Term Goals - Time Frame for Long term goals : 3 months   Long term goal 1: Patient will demonstrate the ability to transition in/out of the quadruped position with minAx1 in order to progress gross motor skills-met [x]Met  []Partially met  []Not met   Long term goal 2: Patient will demonstrate the ability to perform floor to stand transitions through half kneel with support from stable object in order to ease ambulation  []Met  []Partially met  []Not met   Long term goal 3: Patient will demonstrate the ability to independently stand for 1 minute while participating in squatting activities in order to improve B LE strength. []Met  []Partially met  [x]Not met   Long term goal 4: Patient will demonstrate the ability to ambulate 50ftx1 independently with push cart with normalized gait pattern in order to progress towards age appropriate milestones []Met  []Partially met  [x]Not met   Long term goal 5: Patient will demonstrate the ability  to ascend/descend 4 steps with 1 HHA with step to pattern in order to enter/exit the home.   []Met  []Partially met  [x]Not met       Minutes Tracking:  Time In: 1330  Time Out: 1400  Minutes: 30    Nora Sequeira PT, DPT Date: 10/20/2017

## 2023-12-04 NOTE — POST-PROCEDURE NOTE
Physician Transition of Care Summary  Invasive Cardiovascular Lab    Procedure Date: 12/4/2023  Attending:    * Javier Ma - Primary  Resident/Fellow/Other Assistant: Surgeon(s) and Role:    Indications:   Pre-op Diagnosis     * Coronary artery disease with angina pectoris, unspecified vessel or lesion type, unspecified whether native or transplanted heart (CMS/HCC) [I25.119]     * LV dysfunction [I51.9]    Post-procedure diagnosis:   Post-op Diagnosis     * Coronary artery disease with angina pectoris, unspecified vessel or lesion type, unspecified whether native or transplanted heart (CMS/HCC) [I25.119]     * LV dysfunction [I51.9]    Procedure(s):   Left Heart Cath, With LV  36368 - [place] CATH PLMT L HRT & ARTS W/NJX & ANGIO IMG S&I        Procedure Findings:   Severe 2 vessel CAD in a right dominant system  LVEDP 20  No AS    Description of the Procedure:   R radial    Complications:   none    Stents/Implants:   none    Anticoagulation/Antiplatelet Plan:   ASA    Estimated Blood Loss:   5 mL    Anesthesia: Moderate Sedation Anesthesia Staff: No anesthesia staff entered.    Any Specimen(s) Removed:   No specimens collected during this procedure.    Disposition:   home      Electronically signed by: Javier Ma MD, 12/4/2023 10:36 AM

## 2023-12-04 NOTE — H&P
History Of Present Illness  Jones Howell is a very pleasant 68 y.o. male. He has no significant past medical history except borderline lipids and had recent CT for CAC 6/22/2023: total 775.15:  LM =o,  .62,  Lcx 3.56,  .97. Ascending aorta 3.7 c,  also mildly dilated R PA and main PA and L PA, raising concern for possibly elevated PA pressures. CAD risk facotrs: no Fhx, no HTN, borderline lipids, never smoked and no DM.   Pt is active running 3-4 miles every 3-4 days, bikes and walks dog for 40 minutes daily without CP or SOB.   TTE 11/8/2023: technically difficult study , but LVEF 35-40% with regional wall motion abnormalities, but recommended limited repeat with use of echo-contrast due to sub-optimal endocardial definition.     Here for [facility] with Dr. Ma        Social History  He reports that he has never smoked. He has never used smokeless tobacco. He reports current alcohol use. He reports that he does not use drugs.    Family History  Family History   Problem Relation Name Age of Onset    Alzheimer's disease Mother  80    Other (COVID) Father  90        Allergies  Fentanyl and Lactose    Home Medications  No current facility-administered medications on file prior to encounter.     Current Outpatient Medications on File Prior to Encounter   Medication Sig Dispense Refill    aspirin 81 mg chewable tablet Chew 1 tablet (81 mg) once daily. 90 tablet 3    cholestyramine (Questran) 4 gram packet Take by mouth twice a day.      losartan (Cozaar) 25 mg tablet Take 1 tablet (25 mg) by mouth once daily. 30 tablet 11    NAPROXEN ORAL Naproxen      nitroglycerin (Nitrostat) 0.4 mg SL tablet Place 1 tablet (0.4 mg) under the tongue every 5 minutes if needed for chest pain. May repeat dose every 5 minutes for up to 3 doses total. 100 tablet 11    rosuvastatin (Crestor) 20 mg tablet Take 1 tablet (20 mg) by mouth once daily. 90 tablet 3          Inpatient Medications:            Review of Systems    Constitutional: Negative.    HENT: Negative.     Eyes: Negative.    Respiratory: Negative.     Cardiovascular: Negative.    Gastrointestinal: Negative.    Genitourinary: Negative.    Musculoskeletal: Negative.    Skin: Negative.    Neurological: Negative.    Psychiatric/Behavioral: Negative.            Physical Exam  HENT:      Head: Normocephalic.      Mouth/Throat:      Mouth: Mucous membranes are moist.   Eyes:      Pupils: Pupils are equal, round, and reactive to light.   Cardiovascular:      Rate and Rhythm: Normal rate and regular rhythm.   Pulmonary:      Effort: Pulmonary effort is normal.   Abdominal:      Palpations: Abdomen is soft.   Musculoskeletal:         General: Normal range of motion.      Cervical back: Normal range of motion.   Skin:     General: Skin is warm.   Neurological:      General: No focal deficit present.      Mental Status: He is alert.   Psychiatric:         Mood and Affect: Mood normal.        Sedation Preparation   Last Recorded Vitals  There were no vitals taken for this visit.    Relevant Results    Labs    CBC:   Recent Labs     11/30/23  0838 10/19/23  0814 11/24/21  0836 08/05/20  0826 03/27/18  1042   WBC 5.7 5.8 5.9 5.4 6.4   HGB 13.1* 12.8* 12.8* 13.1* 13.3*   HCT 40.0* 40.6* 40.3* 40.8* 41.8    244 285 242 254   MCV 89 92 91 89 88     BMP/CMP:   Recent Labs     11/30/23  0838 10/19/23  0814 11/24/21  0836 08/05/20  0826 12/04/19  1222 03/27/18  1042    141 144 141 141 139   K 4.2 4.2 4.9 4.4 4.1 4.1    105 107 104 105 104   BUN 15 14 13 14 12 12   CREATININE 1.03 1.03 0.94 1.13 0.86 0.96   CO2 30 27 28 28 28 28   CALCIUM 9.5 9.4 9.5 9.7 9.4 9.4   PROT  --  7.2 6.9 7.0  --  7.2   BILITOT  --  0.8 0.5 0.7  --  0.9   ALKPHOS  --  50 56 46  --  50   ALT  --  13 23 15  --  19   AST  --  26 25 21  --  25   GLUCOSE 85 90 90 87 88 90      Lipid Panel:   Recent Labs     10/19/23  0814 11/24/21  0836 08/05/20  0826 03/27/18  1042   CHOL 210* 188 184 181   HDL  "64.5 51.7 48.1 59.5   CHHDL 3.3 3.6 3.8 3.0   VLDL 16 15 22 20   TRIG 81 74 111 101   NHDL 146  --   --   --      Cardiac   Results from last 7 days   Lab Units 11/30/23  0838   BNP pg/mL 17      Hemoglobin A1C:   Recent Labs     10/19/23  0814   HGBA1C 5.3     TSH/ Free T4:   Recent Labs     10/19/23  0814 11/24/21  0836 08/05/20  0826 03/27/18  1042   TSH 6.04* 4.75* 6.86* 6.82*   FREET4 0.95 0.95 0.95 0.93     Iron:   Recent Labs     11/30/23  0838 10/19/23  0814   FERRITIN  --  211   TIBC  --  349   IRONSAT  --  25   BNP 17  --      Coag:     ABO: No results found for: \"ABO\"    Past Cardiology Tests (Last 3 Years):  EKG:  Encounter Date: 11/13/23   ECG 12 lead (Clinic Performed)   Result Value    Ventricular Rate 58    Atrial Rate 58    ND Interval 196    QRS Duration 82    QT Interval 416    QTC Calculation(Bazett) 408    P Axis 67    R Axis 71    T Axis 55    QRS Count 10    Q Onset 223    P Onset 125    P Offset 186    T Offset 431    QTC Fredericia 411    Narrative    Sinus bradycardia  Otherwise normal ECG  No previous ECGs available  Confirmed by Paco Steinberg ([id number]) on 11/14/2023 8:50:13 AM     Echo:  Results for orders placed during the hospital encounter of 11/13/23    Transthoracic echo (TTE) limited    [facility], [address] [place] [address]  Tel [phone] and Fax [phone]    TRANSTHORACIC ECHOCARDIOGRAM REPORT      Patient Name:     JUAN CARLOS Frances Physician:  [id number] Pebbles Jimenez MD  Study Date:       11/13/2023          Ordering Provider:  [id number] PEBBLES JIMENEZ  MRN/PID:          [id number]            Fellow:  Accession#:       [id number]        Nurse:              Aundrea Meraz RN  Date of           1955 / 68      Sonographer:        Feng Carey RDCS  Birth/Age:        years  Gender:           M                   Additional Staff:  Height:           182.00 cm           Admit Date:         11/13/2023  Weight:           94.00 kg     "        Admission Status:   Outpatient  BSA:              2.16 m2             Encounter#:         [id number]  Department          [facility]  Location:           Invasive  Blood Pressure: 125 /75 mmHg    Study Type:    TRANSTHORACIC ECHO (TTE) LIMITED  Diagnosis/ICD: Encounter for other specified special examinations-Z01.89  Indication:    CAD  CPT Code:      Echo Limited-85676; Doppler Limited-67697; Color Doppler-40525  Study Detail: The following Echo studies were performed: 2D, M-Mode, Doppler and  color flow. Definity used as a contrast agent for endocardial  border definition. Total contrast used for this procedure was 2 mL  via IV push.      PHYSICIAN INTERPRETATION:  Left Ventricle: The left ventricular systolic function is mildly decreased. Wall motion is abnormal. The left ventricular cavity size is normal. Left ventricular diastolic filling was not assessed. Technically difficult despite the use of echocontrast. ( note apical echocontrast images were foreshortened.) The LV apex does appear trabeculated. Note noncontrast images suggestive of LAD territory wall motion abnormality. Also possible inferior hypokinesis. Overall likely mild regional LV systolic dysfunction.  LV Wall Scoring:  The entire anterior septum, apical anterior segment, and apex are hypokinetic.    Left Atrium: The left atrium was not assessed.  Right Ventricle: The right ventricle was not assessed. Right ventricular systolic function not assessed.  Right Atrium: The right atrium was not assessed.  Aortic Valve: The aortic valve is trileaflet. There is no evidence of aortic valve regurgitation.  Mitral Valve: The mitral valve is normal in structure. There is trace mitral valve regurgitation.  Tricuspid Valve: The tricuspid valve is structurally normal. There is trace tricuspid regurgitation. The right ventricular systolic pressure is unable to be estimated.  Pulmonic Valve: The pulmonic valve was not assessed. Pulmonic valve  regurgitation was not assessed.  Pericardium: There is no pericardial effusion noted.  Aorta: The aortic root is abnormal. There is mild dilatation of the aortic root.  Systemic Veins: The inferior vena cava appears mildly dilated.  In comparison to the previous echocardiogram(s): Compared with the prior exam from 11/8/2023, today's limited study did include the use of echocontrast, however these images were somewhat foreshortened. Overall likely LAD territory wall motion abnormality with possibly mild regional LV systolic dysfunction.      CONCLUSIONS:  1. Left ventricular systolic function is mildly decreased.  2. Entire anterior septum, apical anterior segment, and apex are abnormal.  3. Technically difficult despite the use of echocontrast. ( note apical echocontrast images were foreshortened.) The LV apex does appear trabeculated. Note noncontrast images suggestive of LAD territory wall motion abnormality. Also possible inferior hypokinesis. Overall likely mild regional LV systolic dysfunction.  4. Compared with the prior exam from 11/8/2023, today's limited study did include the use of echocontrast, however these images were somewhat foreshortened. Overall likely LAD territory wall motion abnormality with possibly mild regional LV systolic dysfunction.    QUANTITATIVE DATA SUMMARY:  2D MEASUREMENTS:  Normal Ranges:  IVSd:          0.80 cm   (0.6-1.1cm)  LVPWd:         1.20 cm   (0.6-1.1cm)  LVIDd:         5.20 cm   (3.9-5.9cm)  LVIDs:         3.80 cm  LV Mass Index: 90.1 g/m2  LV % FS        26.9 %    AORTA MEASUREMENTS:  Normal Ranges:  Ao Sinus, d: 3.80 cm (2.1-3.5cm)    LV SYSTOLIC FUNCTION BY 2D PLANIMETRY (MOD):  Normal Ranges:  EF-A4C View: 52.4 % (>=55%)  EF-A2C View: 53.4 %  EF-Biplane:  52.7 %    LV DIASTOLIC FUNCTION:  Normal Ranges:  MV Peak E:        0.61 m/s    (0.7-1.2 m/s)  MV Peak A:        0.44 m/s    (0.42-0.7 m/s)  E/A Ratio:        1.38        (1.0-2.2)  MV lateral e'     0.09 m/s  MV  medial e'      0.05 m/s  MV A Dur:         108.00 msec  E/e' Ratio:       7.10        (<8.0)  PulmV Sys Yogesh:    45.50 cm/s  PulmV Bacon Yogesh:   31.60 cm/s  PulmV S/D Yogesh:    1.40  PulmV A Revs Yogesh: 34.70 cm/s  PulmV A Revs Dur: 85.00 msec    MITRAL VALVE:  Normal Ranges:  MV DT: 224 msec (150-240msec)    TRICUSPID VALVE/RVSP:  Normal Ranges:  IVC Diam: 2.30 cm    Pulmonary Veins:  PulmV A Revs Dur: 85.00 msec  PulmV A Revs Yogesh: 34.70 cm/s  PulmV Bacon Yogesh:   31.60 cm/s  PulmV S/D Yogesh:    1.40  PulmV Sys Yogesh:    45.50 cm/s      [id number] Michelle Jimenez MD  Electronically signed on 11/13/2023 at 2:31:16 PM      Wall Scoring        ** Final **      Results for orders placed during the hospital encounter of 11/08/23    Transthoracic Echo (TTE) Complete     at [place] [address]  Tel [phone] and Fax [phone]    TRANSTHORACIC ECHOCARDIOGRAM REPORT      Patient Name:      JUAN CARLOS Frances Physician:    [id number] Michelle Jimenez MD  Study Date:        11/8/2023            Ordering Provider:    [id number] PETER COUCH  MRN/PID:           [id number]             Fellow:  Accession#:        [id number]         Nurse:  Date of Birth/Age: 1955 / 68 years Sonographer:          Ellen Mccann  AL  Gender:            M                    Additional Staff:  Height:            182.88 cm            Admit Date:  Weight:            95.25 kg             Admission Status:     Outpatient  BSA:               2.18 m2              Encounter#:           [id number]  Department Location:  [facility]  Echo Lab  Blood Pressure: 123 /87 mmHg    Study Type:    TRANSTHORACIC ECHO (TTE) COMPLETE  Diagnosis/ICD: Atherosclerotic heart disease of native coronary artery without  angina pectoris-I25.10  Indication:    atherosclerosis of coronary artery of native heart without angina  pectoris, unspecified vessel or lesion type  CPT Code:      Echo Complete w Full  Doppler-[id number]    Patient History:  Pertinent History: Atypical chest pain.    Study Detail: The following Echo studies were performed: 2D, M-Mode, Doppler and  color flow.      PHYSICIAN INTERPRETATION:  Left Ventricle: The left ventricular systolic function is mildly to moderately decreased, with an estimated ejection fraction of 35-40%. Wall motion is abnormal. The left ventricular cavity size is normal. Spectral Doppler shows an abnormal pattern of left ventricular diastolic filling. Echoconrast would have been helpful to better assess the apex and trabeculations. Note that the apical views used to calculate LVEF are foreshortened which will overestimate the LVEF. Consider repeat limited echo with use of echocontrast vs cardiac MRI to better assess LVEF and apical trabeculations. Overall mild to moderate LV systolic dysfunction wtih an LAD territory wall motion abnormality.  LV Wall Scoring:  The mid and apical anterior septum and apex are akinetic. The basal and mid  inferior septum, apical lateral segment, apical anterior segment, and apical  inferior segment are hypokinetic.    Left Atrium: The left atrium is mildly dilated.  Right Ventricle: The right ventricle is mildly enlarged. There is low normal right ventricular systolic function. Trabeculated RV.  Right Atrium: The right atrium is mildly dilated.  Aortic Valve: The aortic valve is trileaflet. There is no evidence of aortic valve regurgitation. The peak instantaneous gradient of the aortic valve is 3.2 mmHg. The mean gradient of the aortic valve is 1.7 mmHg.  Mitral Valve: The mitral valve is normal in structure. There is mild mitral annular calcification. There is trace mitral valve regurgitation. There is some mild bowing of the anterior mitral leaflet without kandace prolapse and only trivial MR.  Tricuspid Valve: The tricuspid valve is structurally normal. There is trace tricuspid regurgitation. The right ventricular systolic pressure is unable to be  estimated.  Pulmonic Valve: The pulmonic valve is not well visualized. There is physiologic pulmonic valve regurgitation.  Pericardium: There is no pericardial effusion noted.  Aorta: The aortic root is normal. The aortic root is at the upper limits of normal size.  Systemic Veins: The inferior vena cava appears dilated. There is IVC inspiratory collapse greater than 50%.  In comparison to the previous echocardiogram(s): There are no prior studies on this patient for comparison purposes. No prior echocardiogram available for comparison.      CONCLUSIONS:  1. Left ventricular systolic function is mildly to moderately decreased with a 35-40% estimated ejection fraction.  2. Multiple segmental abnormalities exist. See findings.  3. Echoconrast would have been helpful to better assess the apex and trabeculations. Note that the apical views used to calculate LVEF are foreshortened which will overestimate the LVEF. Consider repeat limited echo with use of echocontrast vs cardiac MRI to better assess LVEF and apical trabeculations. Overall mild to moderate LV systolic dysfunction wtih an LAD territory wall motion abnormality.  4. Spectral Doppler shows an abnormal pattern of left ventricular diastolic filling.  5. There is low normal right ventricular systolic function.  6. No prior echocardiogram available for comparison.    QUANTITATIVE DATA SUMMARY:  2D MEASUREMENTS:  Normal Ranges:  LAs:           4.02 cm   (2.7-4.0cm)  IVSd:          0.67 cm   (0.6-1.1cm)  LVPWd:         0.73 cm   (0.6-1.1cm)  LVIDd:         5.46 cm   (3.9-5.9cm)  LVIDs:         3.89 cm  LV Mass Index: 61.2 g/m2  LV % FS        28.7 %    LA VOLUME:  Normal Ranges:  LA Vol A4C:        76.8 ml    (22+/-6mL/m2)  LA Vol A2C:        96.0 ml  LA Vol BP:         86.7 ml  LA Vol Index A4C:  35.3ml/m2  LA Vol Index A2C:  44.2 ml/m2  LA Vol Index BP:   39.8 ml/m2  LA Area A4C:       22.7 cm2  LA Area A2C:       25.6 cm2  LA Major Axis A4C: 5.7 cm  LA Major Axis  A2C: 5.8 cm  LA Vol A2C:        55.8 ml    RA VOLUME BY A/L METHOD:  Normal Ranges:  RA Vol A4C:        61.7 ml    (8.3-19.5ml)  RA Vol Index A4C:  28.4 ml/m2  RA Area A4C:       20.7 cm2  RA Major Axis A4C: 5.9 cm    AORTA MEASUREMENTS:  Normal Ranges:  Ao Sinus, d: 3.60 cm (2.1-3.5cm)  Asc Ao, d:   3.30 cm (2.1-3.4cm)    LV SYSTOLIC FUNCTION BY 2D PLANIMETRY (MOD):  Normal Ranges:  EF-A4C View: 57.1 % (>=55%)  EF-A2C View: 49.2 %  EF-Biplane:  51.5 %    LV DIASTOLIC FUNCTION:  Normal Ranges:  MV Peak E:        0.70 m/s    (0.7-1.2 m/s)  MV Peak A:        0.58 m/s    (0.42-0.7 m/s)  E/A Ratio:        1.21        (1.0-2.2)  MV e'             0.07 m/s    (>8.0)  MV lateral e'     0.08 m/s  MV medial e'      0.06 m/s  MV A Dur:         133.79 msec  E/e' Ratio:       10.00       (<8.0)  a'                0.06 m/s  MV DT:            177 msec    (150-240 msec)  PulmV Sys Yogesh:    40.77 cm/s  PulmV Bacon Yogesh:   28.46 cm/s  PulmV S/D Yogesh:    1.43  PulmV A Revs Yogesh: 19.09 cm/s  PulmV A Revs Dur: 129.18 msec    MITRAL VALVE:  Normal Ranges:  MV DT: 177 msec (150-240msec)    AORTIC VALVE:  Normal Ranges:  AoV Vmax:                0.89 m/s (<=1.7m/s)  AoV Peak PG:             3.2 mmHg (<20mmHg)  AoV Mean P.7 mmHg (1.7-11.5mmHg)  LVOT Max Yogesh:            0.82 m/s (<=1.1m/s)  AoV VTI:                 21.16 cm (18-25cm)  LVOT VTI:                18.99 cm  LVOT Diameter:           2.48 cm  (1.8-2.4cm)  AoV Area, VTI:           4.35 cm2 (2.5-5.5cm2)  AoV Area,Vmax:           4.47 cm2 (2.5-4.5cm2)  AoV Dimensionless Index: 0.90      RIGHT VENTRICLE:  RV Basal 4.50 cm  RV Mid   3.30 cm  RV Major 8.4 cm  TAPSE:   26.0 mm  RV s'    0.10 m/s    TRICUSPID VALVE/RVSP:  Normal Ranges:  IVC Diam: 2.60 cm    PULMONIC VALVE:  Normal Ranges:  PV Accel Time: 137 msec (>120ms)  PV Max Yogesh:    0.7 m/s  (0.6-0.9m/s)  PV Max P.2 mmHg    Pulmonary Veins:  PulmV A Revs Dur: 129.18 msec  PulmV A Revs Yogesh: 19.09 cm/s  PulmV Bacon  Yogesh:   28.46 cm/s  PulmV S/D Yogesh:    1.43  PulmV Sys Yogesh:    40.77 cm/s    AORTA:  Asc Ao Diam 3.34 cm      [id number] Michelle Jimenez MD  Electronically signed on 11/8/2023 at 9:24:28 PM      Wall Scoring        ** Final **          === 06/22/23 ===    CT CARDIAC SCORING WO IV CONTRAST    - Impression -  1. Coronary artery calcium score of 775*. Three-vessel coronary  artery atherosclerosis.  2. The main pulmonary artery, right and left pulmonary artery are  dilated measuring 3.0, 2.6 and 2.4 cm respectively. Correlate with  elevated pulmonary pressures.    *Coronary Artery  Agatston score    Score  risk  Very low 1-99  Mildly increased 100-299  Moderately increased >300  Moderate to severely increased >800    Harris et al. JCCT 2016 (http://dx.doi.org/10.1016/j.jcct.2016.11.003)    TILLEY 10-Year CHD Risk with Coronary Artery Calcification can be  calcuate using link below  [email or web address]  Angela ramirez al. JACC 2015 (http://dx.doi.org/10.1016/j.j  acc.2015.08.035)    Assessment/Plan  Jones Howell is a very pleasant 68 y.o. male. He has no significant past medical history except borderline lipids and had recent CT for CAC 6/22/2023: total 775.15:  LM =o,  .62,  Lcx 3.56,  .97. Ascending aorta 3.7 c,  also mildly dilated R PA and main PA and L PA, raising concern for possibly elevated PA pressures. CAD risk facotrs: no Fhx, no HTN, borderline lipids, never smoked and no DM.   Pt is active running 3-4 miles every 3-4 days, bikes and walks dog for 40 minutes daily without CP or SOB.   TTE 11/8/2023: technically difficult study , but LVEF 35-40% with regional wall motion abnormalities, but recommended limited repeat with use of echo-contrast due to sub-optimal endocardial definition.     Here for C with Dr. Ma.           I spent 40 minutes in the professional and overall care of this patient.      Alma Delia Thomas APRN-CNP

## 2023-12-04 NOTE — DISCHARGE INSTRUCTIONS

## 2023-12-05 NOTE — PROGRESS NOTES
Referral from Dr. Sanchez. Samir comes to discuss treatment recommendations for coronary artery stenosis seen on recent catheterization. Mercedes Petersen RN

## 2023-12-07 ENCOUNTER — OFFICE VISIT (OUTPATIENT)
Dept: CARDIAC SURGERY | Facility: HOSPITAL | Age: 68
End: 2023-12-07
Payer: COMMERCIAL

## 2023-12-07 ENCOUNTER — PREP FOR PROCEDURE (OUTPATIENT)
Dept: ANESTHESIOLOGY | Facility: HOSPITAL | Age: 68
End: 2023-12-07

## 2023-12-07 VITALS
OXYGEN SATURATION: 98 % | HEIGHT: 72 IN | HEART RATE: 51 BPM | WEIGHT: 210 LBS | SYSTOLIC BLOOD PRESSURE: 130 MMHG | BODY MASS INDEX: 28.44 KG/M2 | DIASTOLIC BLOOD PRESSURE: 80 MMHG

## 2023-12-07 DIAGNOSIS — I25.10 2-VESSEL CORONARY ARTERY DISEASE: Primary | ICD-10-CM

## 2023-12-07 DIAGNOSIS — I25.119 CORONARY ARTERY DISEASE INVOLVING NATIVE CORONARY ARTERY OF NATIVE HEART WITH ANGINA PECTORIS (CMS-HCC): ICD-10-CM

## 2023-12-07 DIAGNOSIS — I25.118 CORONARY ARTERY DISEASE OF NATIVE ARTERY OF NATIVE HEART WITH STABLE ANGINA PECTORIS (CMS-HCC): ICD-10-CM

## 2023-12-07 PROCEDURE — 99204 OFFICE O/P NEW MOD 45 MIN: CPT | Performed by: THORACIC SURGERY (CARDIOTHORACIC VASCULAR SURGERY)

## 2023-12-07 PROCEDURE — 1126F AMNT PAIN NOTED NONE PRSNT: CPT | Performed by: THORACIC SURGERY (CARDIOTHORACIC VASCULAR SURGERY)

## 2023-12-07 PROCEDURE — 1036F TOBACCO NON-USER: CPT | Performed by: THORACIC SURGERY (CARDIOTHORACIC VASCULAR SURGERY)

## 2023-12-07 PROCEDURE — 99214 OFFICE O/P EST MOD 30 MIN: CPT | Mod: 57 | Performed by: THORACIC SURGERY (CARDIOTHORACIC VASCULAR SURGERY)

## 2023-12-07 PROCEDURE — 1160F RVW MEDS BY RX/DR IN RCRD: CPT | Performed by: THORACIC SURGERY (CARDIOTHORACIC VASCULAR SURGERY)

## 2023-12-07 PROCEDURE — 1159F MED LIST DOCD IN RCRD: CPT | Performed by: THORACIC SURGERY (CARDIOTHORACIC VASCULAR SURGERY)

## 2023-12-07 RX ORDER — CHLORHEXIDINE GLUCONATE ORAL RINSE 1.2 MG/ML
15 SOLUTION DENTAL 2 TIMES DAILY
Status: CANCELLED | OUTPATIENT
Start: 2023-12-07 | End: 2023-12-08

## 2023-12-07 RX ORDER — MUPIROCIN 20 MG/G
0.5 OINTMENT TOPICAL 2 TIMES DAILY
Status: CANCELLED | OUTPATIENT
Start: 2023-12-07 | End: 2023-12-12

## 2023-12-07 ASSESSMENT — PAIN SCALES - GENERAL: PAINLEVEL: 0-NO PAIN

## 2023-12-07 NOTE — H&P
History Of Present Illness  Samir Howell is a 68 y.o. male presenting with coronary artery disease.  Patient underwent calcium scoring which had a coronary calcium score of greater than 700.  He had an echocardiogram which showed mild to moderate decrease in LV function, particularly with decreased contractility of the anterior wall.  Coronary angiography demonstrated high-grade stenosis of his LAD, right coronary artery, and posterior descending artery.  He had mild disease of his diagonal and circumflex coronary artery system.  Patient referred for coronary artery bypass grafting.    Considering patient has multivessel coronary artery disease including his proximal LAD and decreased LV function, he will benefit from coronary artery bypass grafting.  Recommend coronary bypass grafting x 2 with left internal thoracic artery to the LAD and radial artery to the right/posterior descending artery.  Risks, benefits, and alternatives discussed with patient and wife, who wished to proceed..         Juan A Jimenez MD

## 2023-12-14 ENCOUNTER — TELEMEDICINE CLINICAL SUPPORT (OUTPATIENT)
Dept: PREADMISSION TESTING | Facility: HOSPITAL | Age: 68
End: 2023-12-14
Payer: COMMERCIAL

## 2023-12-18 ENCOUNTER — OFFICE VISIT (OUTPATIENT)
Dept: CARDIOLOGY | Facility: HOSPITAL | Age: 68
End: 2023-12-18
Payer: COMMERCIAL

## 2023-12-18 VITALS
SYSTOLIC BLOOD PRESSURE: 124 MMHG | WEIGHT: 205 LBS | HEART RATE: 61 BPM | HEIGHT: 72 IN | OXYGEN SATURATION: 97 % | DIASTOLIC BLOOD PRESSURE: 78 MMHG | BODY MASS INDEX: 27.77 KG/M2

## 2023-12-18 DIAGNOSIS — I51.9 LV DYSFUNCTION: Primary | ICD-10-CM

## 2023-12-18 DIAGNOSIS — I25.118 CORONARY ARTERY DISEASE OF NATIVE ARTERY OF NATIVE HEART WITH STABLE ANGINA PECTORIS (CMS-HCC): ICD-10-CM

## 2023-12-18 PROCEDURE — 1160F RVW MEDS BY RX/DR IN RCRD: CPT | Performed by: INTERNAL MEDICINE

## 2023-12-18 PROCEDURE — 1159F MED LIST DOCD IN RCRD: CPT | Performed by: INTERNAL MEDICINE

## 2023-12-18 PROCEDURE — 1036F TOBACCO NON-USER: CPT | Performed by: INTERNAL MEDICINE

## 2023-12-18 PROCEDURE — 99213 OFFICE O/P EST LOW 20 MIN: CPT | Performed by: INTERNAL MEDICINE

## 2023-12-18 PROCEDURE — 1126F AMNT PAIN NOTED NONE PRSNT: CPT | Performed by: INTERNAL MEDICINE

## 2023-12-18 RX ORDER — CHOLECALCIFEROL (VITAMIN D3) 50 MCG
50 TABLET ORAL DAILY
COMMUNITY

## 2023-12-18 ASSESSMENT — ENCOUNTER SYMPTOMS
LOSS OF SENSATION IN FEET: 0
OCCASIONAL FEELINGS OF UNSTEADINESS: 0
DEPRESSION: 0

## 2023-12-18 NOTE — PROGRESS NOTES
Primary Care Physician: Shey Nguyễn DO      Date of Visit: 12/18/2023  9:20 AM EST  Location of visit: [facility]   Type of Visit: Established Patient     HPI / Summary:   Jones Howell is a very pleasant 68 y.o. male who was found to have a high CAC ( 775) and reduced LV EF ( 35-40%) on cath found to have multivessel CAD who is awaiting cardiac surgery ( January 4 2024) who comes for follow up post cath and to check to be sure tolerating current medications.      He has no significant past medical history except borderline lipids and had recent CT for CAC 6/22/2023: total 775.15:  LM =o,  .62,  Lcx 3.56,  .97. Ascending aorta 3.7 c,  also mildly dilated R PA and main PA and L PA, raising concern for possibly elevated PA pressures.   CAD risk facotrs: no Fhx, no HTN, borderline lipids, never smoked and no DM.   Cardiac echo done 11/8/2023: technically difficult study , but LVEF 35-40% with regional wall motion abnormalities, but recommended limited repeat with use of echo-contrast due to sub-optimal endocardial definition.   Pt had been an avid runner and was asymptomatic, but after findings significant CAD was asked to stop running for now. He remains asymptomatic with ADLs and his current home Bps run 120s/70smmHg He is on rosuvastatin 20 mg daily without any myalgias. At prior visit started losartan 25 mg daily due to low LVEF. Will not adjust at this time. Following surgery will reassess LV systolic function. After that can adjust CHF med regimen.   Cardiac cath 12/4/2023: severe 2 vessel CAD. Elevated LVEDP.    Coronary Lesion Summary:  Vessel         Stenosis   Vessel Segment  LAD          80% stenosis proximal to mid  LAD          80% stenosis       mid  1st Diagonal 40% stenosis    proximal  Circumflex   30% stenosis    proximal  Ramus        40% stenosis    proximal  RCA          99% stenosis       mid  RPDA         90% stenosis    proximal  Pt consulted with Dr MARY GRACE Jimenez and CABG  is scheduled from 1/4/2024    ROS: Full 10 pt review of symptoms of negative unless discussed above.     Problems:   Patient Active Problem List   Diagnosis    Focal lymphocytic colitis    Celiac disease    Anisometropia    Atypical chest pain    Benign positional vertigo    Blepharitis    Contact dermatitis due to poison ivy    ED (erectile dysfunction) of organic origin    GERD (gastroesophageal reflux disease)    Hemangioma of skin and subcutaneous tissue    Hyperpigmented skin lesion    Lactose intolerance    Melanocytic nevi of trunk    Melanocytic nevi of other parts of face    Loose stools    Nuclear sclerotic cataract of both eyes    Other benign neoplasm of skin of right lower limb, including hip    Other melanin hyperpigmentation    Actinic keratosis    Other seborrheic keratosis    Skin changes due to chronic exposure to nonionizing radiation, unspecified    Pseudophakia of right eye    Sciatica    Skin lesion    Vertigo    Vitamin D deficiency    Melanocytic nevi of right upper limb, including shoulder    Melanocytic nevi of left upper limb, including shoulder    Melanocytic nevi of right lower limb, including hip    Melanocytic nevi of left lower limb, including hip    Coronary artery disease with angina pectoris (CMS/HCC)    LV dysfunction    Coronary artery disease of native artery of native heart with stable angina pectoris (CMS/HCC)     Medical History:   Past Medical History:   Diagnosis Date    Coronary artery disease 12/04/2023    Severe 2 vessel CAD in a right dominant system.  Elevated LVEDP.  No evidence of aortic stenosis.    GERD (gastroesophageal reflux disease)     Hyperlipidemia     Lymphocytic colitis      Surgical Hx:   Past Surgical History:   Procedure Laterality Date    CARDIAC CATHETERIZATION N/A 12/4/2023    Procedure: Left Heart Cath, With LV;  Surgeon: Javier Ma MD;  Location: [facility] Cardiac Cath Lab;  Service: Cardiovascular;  Laterality: N/A;      Social Hx:   Tobacco Use:  Low Risk  (2023)    Patient History     Smoking Tobacco Use: Never     Smokeless Tobacco Use: Never     Passive Exposure: Not on file     Alcohol Use: Not on file     Family Hx:   Family History   Problem Relation Name Age of Onset    Alzheimer's disease Mother  80    Other (COVID) Father  90      Exam:   Vitals:   Vitals:    23 0929   BP: 124/78   BP Location: Right arm   Patient Position: Sitting   BP Cuff Size: Adult   Pulse: 61   SpO2: 97%   Weight: 93 kg (205 lb)   Height: 1.829 m (6')     Wt Readings from Last 5 Encounters:   23 93 kg (205 lb)   23 95.3 kg (210 lb)   23 94.3 kg (208 lb)   23 95.3 kg (210 lb 2 oz)   21 97.1 kg (214 lb)      Vitals reviewed.   Constitutional:       Appearance: Healthy appearance. Not in distress.   Pulmonary:      Effort: Pulmonary effort is normal.      Breath sounds: Normal breath sounds.   Cardiovascular:      PMI at left midclavicular line. Normal rate. Regular rhythm. S1 with normal intensity. S2 with normal intensity.       Murmurs: There is no murmur.   Pulses:     Intact distal pulses.   Edema:     Peripheral edema absent.   Neurological:      Mental Status: Alert and oriented to person, place, and time.       Labs:   Recent Labs     23  0838 10/19/23  0814 21  0836 20  0826 18  1042   WBC 5.7 5.8 5.9 5.4 6.4   HGB 13.1* 12.8* 12.8* 13.1* 13.3*   HCT 40.0* 40.6* 40.3* 40.8* 41.8    244 285 242 254   MCV 89 92 91 89 88     Recent Labs     23  0838 10/19/23  0814 21  0836 20  0826 19  1222 18  1042    141 144 141 141 139   K 4.2 4.2 4.9 4.4 4.1 4.1    105 107 104 105 104   BUN 15 14 13 14 12 12   CREATININE 1.03 1.03 0.94 1.13 0.86 0.96      Recent Labs     23  0838 10/19/23  0814   HGBA1C  --  5.3   BNP 17  --    FERRITIN  --  211   TIBC  --  349   IRONSAT  --  25     RESUFAST(CHOL:1,HDL:1,LDLF:1,TRI)  Lab Results   Component Value Date    CHOL 210  (H) 10/19/2023    HDL 64.5 10/19/2023    LDLF 122 (H) 11/24/2021    TRIG 81 10/19/2023      10/19/2023 ( this was on no statin)     Notable Studies: imaging personally reviewed and summarized by me below  EKG:  Encounter Date: 11/13/23   ECG 12 lead (Clinic Performed)   Result Value    Ventricular Rate 58    Atrial Rate 58    NC Interval 196    QRS Duration 82    QT Interval 416    QTC Calculation(Bazett) 408    P Axis 67    R Axis 71    T Axis 55    QRS Count 10    Q Onset 223    P Onset 125    P Offset 186    T Offset 431    QTC Fredericia 411    Narrative    Sinus bradycardia  Otherwise normal ECG  No previous ECGs available  Confirmed by Paco Steinberg ([id number]) on 11/14/2023 8:50:13 AM       Echo:  - Echo (11/13/2023)   PHYSICIAN INTERPRETATION:  Left Ventricle: The left ventricular systolic function is mildly decreased. Wall motion is abnormal. The left ventricular cavity size is normal. Left ventricular diastolic filling was not assessed. Technically difficult despite the use of echocontrast. ( note apical echocontrast images were foreshortened.) The LV apex does appear trabeculated. Note noncontrast images suggestive of LAD territory wall motion abnormality. Also possible inferior hypokinesis. Overall likely mild regional LV systolic dysfunction.  LV Wall Scoring:  The entire anterior septum, apical anterior segment, and apex are hypokinetic.     Left Atrium: The left atrium was not assessed.  Right Ventricle: The right ventricle was not assessed. Right ventricular systolic function not assessed.  Right Atrium: The right atrium was not assessed.  Aortic Valve: The aortic valve is trileaflet. There is no evidence of aortic valve regurgitation.  Mitral Valve: The mitral valve is normal in structure. There is trace mitral valve regurgitation.  Tricuspid Valve: The tricuspid valve is structurally normal. There is trace tricuspid regurgitation. The right ventricular systolic pressure is unable to be  estimated.  Pulmonic Valve: The pulmonic valve was not assessed. Pulmonic valve regurgitation was not assessed.  Pericardium: There is no pericardial effusion noted.  Aorta: The aortic root is abnormal. There is mild dilatation of the aortic root.  Systemic Veins: The inferior vena cava appears mildly dilated.  In comparison to the previous echocardiogram(s): Compared with the prior exam from 11/8/2023, today's limited study did include the use of echocontrast, however these images were somewhat foreshortened. Overall likely LAD territory wall motion abnormality with possibly mild regional LV systolic dysfunction.        CONCLUSIONS:   1. Left ventricular systolic function is mildly decreased.   2. Entire anterior septum, apical anterior segment, and apex are abnormal.   3. Technically difficult despite the use of echocontrast. ( note apical echocontrast images were foreshortened.) The LV apex does appear trabeculated. Note noncontrast images suggestive of LAD territory wall motion abnormality. Also possible inferior hypokinesis. Overall likely mild regional LV systolic dysfunction.   4. Compared with the prior exam from 11/8/2023, today's limited study did include the use of echocontrast, however these images were somewhat foreshortened. Overall likely LAD territory wall motion abnormality with possibly mild regional LV systolic dysfunction      Catheterization:  - [facility] (12/4/2023)  Coronary Lesion Summary:  Vessel         Stenosis   Vessel Segment  LAD          80% stenosis proximal to mid  LAD          80% stenosis       mid  1st Diagonal 40% stenosis    proximal  Circumflex   30% stenosis    proximal  Ramus        40% stenosis    proximal  RCA          99% stenosis       mid  RPDA         90% stenosis    proximal  Cardiac stress test   Raised LVEDP      Current Outpatient Medications   Medication Instructions    aspirin 81 mg, oral, Daily    b complex 0.4 mg tablet 1 tablet, oral, Daily    cholecalciferol  (VITAMIN D3) 50 mcg, oral, Daily    ibuprofen 600 mg, oral, Every 8 hours PRN    losartan (COZAAR) 25 mg, oral, Daily    nitroglycerin (NITROSTAT) 0.4 mg, sublingual, Every 5 min PRN, May repeat dose every 5 minutes for up to 3 doses total.    rosuvastatin (CRESTOR) 20 mg, oral, Daily     Impressions and Plan:    Pt is a previously very active 68 year old man found to have severe 2 vessel CAD with reduced LV function ( LVEF 35-40%) who remains asymptomatic with respect to no CP or SOB or sx of CHF with low BNP who is now scheduled for surgical revascularization 1/4/2024. Will not adjust medications at this time. His LDL target will be LDL < 55. Will reassess lipids levels following surgery. Will also reassess LVEF at 3 months following revascularization to reassess what his LVEF becomes following revascularization. ( May have perioperative echos as well.   Plan  Multivessel CAD- continue aspirin  and CABG and statin   Lipids- target LDL < 55. For now continue rosuvastatin 20 mg daily.  3. Reduced LVEF- likely due to ischemia- continue losartan 25 mg daily ( except held around time of surgery) and will reassess CHF regimen following surgery  Return to clinic for folloup after CABG        Patient Instructions:  If you have any questions or need cardiac medication refills, please call my office at [phone],      To reach my office please call [phone]  To schedule an appointment call [phone].          ____________________________________________________________  Michelle Jimenez MD  Division of Cardiovascular Medicine  [facility] [place]  [facility]

## 2023-12-20 ENCOUNTER — APPOINTMENT (OUTPATIENT)
Dept: PRIMARY CARE | Facility: CLINIC | Age: 68
End: 2023-12-20
Payer: COMMERCIAL

## 2023-12-22 ENCOUNTER — PRE-ADMISSION TESTING (OUTPATIENT)
Dept: PREADMISSION TESTING | Facility: HOSPITAL | Age: 68
End: 2023-12-22
Payer: MEDICARE

## 2023-12-22 VITALS
SYSTOLIC BLOOD PRESSURE: 120 MMHG | DIASTOLIC BLOOD PRESSURE: 75 MMHG | BODY MASS INDEX: 27.77 KG/M2 | HEART RATE: 57 BPM | WEIGHT: 205 LBS | TEMPERATURE: 97.3 F | HEIGHT: 72 IN

## 2023-12-22 DIAGNOSIS — I25.118 CORONARY ARTERY DISEASE OF NATIVE ARTERY OF NATIVE HEART WITH STABLE ANGINA PECTORIS (CMS-HCC): Primary | ICD-10-CM

## 2023-12-22 LAB
ABO GROUP (TYPE) IN BLOOD: NORMAL
ANION GAP SERPL CALC-SCNC: 11 MMOL/L (ref 10–20)
ANTIBODY SCREEN: NORMAL
APTT PPP: 29 SECONDS (ref 27–38)
BUN SERPL-MCNC: 12 MG/DL (ref 6–23)
CALCIUM SERPL-MCNC: 9.5 MG/DL (ref 8.6–10.6)
CHLORIDE SERPL-SCNC: 106 MMOL/L (ref 98–107)
CO2 SERPL-SCNC: 28 MMOL/L (ref 21–32)
CREAT SERPL-MCNC: 0.99 MG/DL (ref 0.5–1.3)
ERYTHROCYTE [DISTWIDTH] IN BLOOD BY AUTOMATED COUNT: 12.5 % (ref 11.5–14.5)
GFR SERPL CREATININE-BSD FRML MDRD: 84 ML/MIN/1.73M*2
GLUCOSE SERPL-MCNC: 84 MG/DL (ref 74–99)
HCT VFR BLD AUTO: 38.4 % (ref 41–52)
HGB BLD-MCNC: 12.5 G/DL (ref 13.5–17.5)
INR PPP: 1 (ref 0.9–1.1)
MCH RBC QN AUTO: 28.7 PG (ref 26–34)
MCHC RBC AUTO-ENTMCNC: 32.6 G/DL (ref 32–36)
MCV RBC AUTO: 88 FL (ref 80–100)
NRBC BLD-RTO: 0 /100 WBCS (ref 0–0)
PLATELET # BLD AUTO: 211 X10*3/UL (ref 150–450)
POTASSIUM SERPL-SCNC: 4.4 MMOL/L (ref 3.5–5.3)
PROTHROMBIN TIME: 11.5 SECONDS (ref 9.8–12.8)
RBC # BLD AUTO: 4.36 X10*6/UL (ref 4.5–5.9)
RH FACTOR (ANTIGEN D): NORMAL
SODIUM SERPL-SCNC: 141 MMOL/L (ref 136–145)
WBC # BLD AUTO: 5.1 X10*3/UL (ref 4.4–11.3)

## 2023-12-22 PROCEDURE — 86901 BLOOD TYPING SEROLOGIC RH(D): CPT

## 2023-12-22 PROCEDURE — 99205 OFFICE O/P NEW HI 60 MIN: CPT | Performed by: NURSE PRACTITIONER

## 2023-12-22 PROCEDURE — 36415 COLL VENOUS BLD VENIPUNCTURE: CPT

## 2023-12-22 PROCEDURE — 80048 BASIC METABOLIC PNL TOTAL CA: CPT

## 2023-12-22 PROCEDURE — 87081 CULTURE SCREEN ONLY: CPT | Performed by: NURSE PRACTITIONER

## 2023-12-22 PROCEDURE — 85027 COMPLETE CBC AUTOMATED: CPT

## 2023-12-22 PROCEDURE — 85610 PROTHROMBIN TIME: CPT

## 2023-12-22 PROCEDURE — 86920 COMPATIBILITY TEST SPIN: CPT

## 2023-12-22 RX ORDER — CHLORHEXIDINE GLUCONATE ORAL RINSE 1.2 MG/ML
15 SOLUTION DENTAL AS NEEDED
Qty: 473 ML | Refills: 0 | Status: SHIPPED | OUTPATIENT
Start: 2023-12-22 | End: 2023-12-24

## 2023-12-22 RX ORDER — CHLORHEXIDINE GLUCONATE 40 MG/ML
SOLUTION TOPICAL DAILY PRN
Qty: 473 ML | Refills: 0 | Status: SHIPPED | OUTPATIENT
Start: 2023-12-22 | End: 2023-12-27

## 2023-12-22 ASSESSMENT — CHADS2 SCORE
AGE GREATER THAN OR EQUAL TO 75: NO
CHADS2 SCORE: 0
HYPERTENSION: NO
DIABETES: NO
PRIOR STROKE OR TIA OR THROMBOEMBOLISM: NO
CHF: NO

## 2023-12-22 ASSESSMENT — DUKE ACTIVITY SCORE INDEX (DASI)
CAN YOU WALK A BLOCK OR TWO ON LEVEL GROUND: YES
CAN YOU PARTICIPATE IN MODERATE RECREATIONAL ACTIVITIES LIKE GOLF, BOWLING, DANCING, DOUBLES TENNIS OR THROWING A BASEBALL OR FOOTBALL: YES
CAN YOU RUN A SHORT DISTANCE: YES
CAN YOU DO YARD WORK LIKE RAKING LEAVES, WEEDING OR PUSHING A MOWER: YES
TOTAL_SCORE: 58.2
CAN YOU DO MODERATE WORK AROUND THE HOUSE LIKE VACUUMING, SWEEPING FLOORS OR CARRYING GROCERIES: YES
CAN YOU TAKE CARE OF YOURSELF (EAT, DRESS, BATHE, OR USE TOILET): YES
CAN YOU HAVE SEXUAL RELATIONS: YES
CAN YOU DO HEAVY WORK AROUND THE HOUSE LIKE SCRUBBING FLOORS OR LIFTING AND MOVING HEAVY FURNITURE: YES
CAN YOU CLIMB A FLIGHT OF STAIRS OR WALK UP A HILL: YES
DASI METS SCORE: 9.9
CAN YOU PARTICIPATE IN STRENOUS SPORTS LIKE SWIMMING, SINGLES TENNIS, FOOTBALL, BASKETBALL, OR SKIING: YES
CAN YOU WALK INDOORS, SUCH AS AROUND YOUR HOUSE: YES
CAN YOU DO LIGHT WORK AROUND THE HOUSE LIKE DUSTING OR WASHING DISHES: YES

## 2023-12-22 ASSESSMENT — ENCOUNTER SYMPTOMS
RESPIRATORY NEGATIVE: 1
ENDOCRINE NEGATIVE: 1
MUSCULOSKELETAL NEGATIVE: 1
EYES NEGATIVE: 1
CONSTITUTIONAL NEGATIVE: 1
NEUROLOGICAL NEGATIVE: 1
CARDIOVASCULAR NEGATIVE: 1
GASTROINTESTINAL NEGATIVE: 1
NECK NEGATIVE: 1

## 2023-12-22 ASSESSMENT — LIFESTYLE VARIABLES: SMOKING_STATUS: NONSMOKER

## 2023-12-22 NOTE — CPM/PAT H&P
"CPM/PAT Evaluation       Name: Jones Howell (Jones Howell \"Samir\")  /Age: 1955/68 y.o.     Visit Type:   In-Person       Chief Complaint: Coronary artery disease of native artery of native heart with stable angina pectoris     HPI  Pt is a 68 year old male with multivessel CAD, reduced EF  and HLD.  Pt is being evaluated in [facility] in anticipation of an Median Sternotomy, and Coronary Artery BYpass Grafting x 2 with Dr. Jimenez on 24.  Past Medical History:   Diagnosis Date    Cataract     Celiac disease     Colitis     Coronary artery disease 2023    Severe 2 vessel CAD in a right dominant system.  Elevated LVEDP.  No evidence of aortic stenosis.    Hyperlipidemia     Lymphocytic colitis        Past Surgical History:   Procedure Laterality Date    CARDIAC CATHETERIZATION N/A 2023    Procedure: Left Heart Cath, With LV;  Surgeon: Javier Ma MD;  Location: [facility] Cardiac Cath Lab;  Service: Cardiovascular;  Laterality: N/A;    LUMBAR SPINE SURGERY         Patient Sexual activity questions deferred to the physician.    Family History   Problem Relation Name Age of Onset    Alzheimer's disease Mother  80    Other (COVID) Father  90       Allergies   Allergen Reactions    Fentanyl Other     Diaphoresis and bradycardia    Lactose Diarrhea       Prior to Admission medications    Medication Sig Start Date End Date Taking? Authorizing Provider   aspirin 81 mg chewable tablet Chew 1 tablet (81 mg) once daily. 23  Michelle Jimenez MD   b complex 0.4 mg tablet Take 1 tablet by mouth once daily.    Historical Provider, MD   cholecalciferol (Vitamin D3) 50 MCG ( UT) tablet Take 1 tablet (50 mcg) by mouth once daily.    Historical Provider, MD   ibuprofen 800 mg tablet Take 600 mg by mouth every 8 hours if needed for mild pain (1 - 3) (soreness after exercising). 23   Historical Provider, MD   losartan (Cozaar) 25 mg tablet Take 1 tablet (25 mg) by mouth once daily. 23 " 11/12/24  Michelle Jimenez MD   nitroglycerin (Nitrostat) 0.4 mg SL tablet Place 1 tablet (0.4 mg) under the tongue every 5 minutes if needed for chest pain. May repeat dose every 5 minutes for up to 3 doses total. 11/13/23 11/12/24  Michelle Jimenez MD   rosuvastatin (Crestor) 20 mg tablet Take 1 tablet (20 mg) by mouth once daily. 11/13/23 11/12/24  Michelle Jimenez MD        PAT ROS:   Constitutional:   neg    Neuro/Psych:   neg    Eyes:   neg    Ears:    tinnitus  Nose:   neg    Mouth:   neg    Throat:   neg    Neck:   neg    Cardio:   neg    Respiratory:   neg    Endocrine:   neg    GI:   neg    :   neg    Musculoskeletal:   neg    Hematologic:   neg    Skin:  neg        Physical Exam  Vitals reviewed. Physical exam within normal limits.          PAT AIRWAY:   Airway:     TM distance::  >3 FB    Neck ROM::  Full  normal        Visit Vitals  /75   Pulse 57   Temp 36.3 °C (97.3 °F)       DASI Risk Score      Flowsheet Row Most Recent Value   DASI SCORE 58.2   METS Score (Will be calculated only when all the questions are answered) 9.9          Caprini DVT Assessment      Flowsheet Row Most Recent Value   DVT Score 9   Current Status Major surgery planned, lasting over 3 hours   History Prior major surgery   Age 60-75 years   BMI 30 or less          Modified Frailty Index      Flowsheet Row Most Recent Value   Modified Frailty Index Calculator .0909          CHADS2 Stroke Risk  Current as of 28 minutes ago        N/A 3 - 100%: High Risk   2 - 3%: Medium Risk   0 - 2%: Low Risk     Last Change: N/A          This score determines the patient's risk of having a stroke if the patient has atrial fibrillation.        This score is not applicable to this patient. Components are not calculated.          Revised Cardiac Risk Index      Flowsheet Row Most Recent Value   Revised Cardiac Risk Calculator 1          Apfel Simplified Score      Flowsheet Row Most Recent Value   Apfel Simplified Score Calculator 2          Risk  Analysis Index Results This Encounter    No data found in the last 1 encounters.       Stop Bang Score      Flowsheet Row Most Recent Value   Do you snore loudly? 1   Do you often feel tired or fatigued after your sleep? 0   Has anyone ever observed you stop breathing in your sleep? 0   Do you have or are you being treated for high blood pressure? 1   Recent BMI (Calculated) 27.8   Is BMI greater than 35 kg/m2? 0=No   Age older than 50 years old? 1=Yes   Gender - Male 1=Yes            Assessment and Plan:     Anesthesia:  The patient denies problems with anesthesia in the past such as PONV, prolonged sedation, awareness, dental damage, aspiration, cardiac arrest, difficult intubation, or unexpected hospital admissions.     Neuro:   The patient has no neurological diagnoses or significant findings on chart review, clinical presentation, and evaluation.  No grossly apparent perioperative risk. The patient is at increased risk for perioperative stroke secondary to cardiac disease, increased age, hyperlipidemia, diabetes mellitus, general anesthesia, cardiac or emergency surgery.    HEENT/Airway  No diagnoses, significant findings on chart review, clinical presentation, or evaluation.    Cardiovascular  The patient is scheduled for cardiac surgery.  Preoperative evaluation is complete pending results of Lab work  RCRI  The patient meets 0-1 RCRI criteria and therefore has a less than 1% risk of major adverse cardiac complications.  METS  The patient's functional capacity capacity is greater than 4 METS.  EKG  The patient has no EKG or echocardiographic changes concerning for myocardial ischemia.  No further cardiac evaluation is indicated  Heart Failure  The patient has no known history of heart failure.  Additionally, the patient reports no symptoms of heart failure and demonstrates no signs of heart failure.  Hypertension Evaluation  The patient has no known history of hypertension and has a normal blood pressure  today.  Heart Rhythm Evaluation  The patient has no history of arrhythmias.  Heart Valve Evaluation  The patient has no known history of valvular heart disease. The patient has no symptoms or physical exam findings to suggest valvular heart disease.  CARDS EVAL  The patient follows with cardiology, Dr. Jimenez. Patient was last seen 12-18-23. Per note, Pt scheduled for CABG.  The patient has a 30-day risk for MACE of 1 predictor, 6.0% risk for cardiac death, nonfatal myocardial infarction, and nonfatal cardiac arrest.  DENIS score which indicates a 0.0% risk of intraoperative or 30-day postoperative.    Pulmonary   No significant findings on chart review or clinical presentation and evaluation. The patient is at increased risk of perioperative pulmonary complications secondary to thoracic surgery, advanced age greater than 60.  The patient has a stop bang score of 3, which places patient at intermediate risk for having GENO.  ARISCAT 50, High, 42.1% risk of in-hospital postoperative pulmonary complications  PRODIGY 16, high of respiratory depression episode.    Hematology  No diagnoses or significant findings on chart review or clinical presentation and evaluation.  Antiplatelet management   The patient is currently receiving antiplatelet therapy for CAD.  Anticoagulation management  The patient is not currently receiving anticoagulation therapy.  Caprini score 9, high risk of perioperative VTE  Transfusion Evaluation  A type and screen was obtained given the likelihood for perioperative transfusion of blood or blood products.    GI  No diagnoses or significant findings on chart review or clinical presentation and evaluation.  Eat 10- 0,  self-perceived oropharyngeal dysphagia scale (0-40)     Genitourinary  No diagnoses or significant findings on chart review or clinical presentation and evaluation.    Renal  The patient has no known history of chronic kidney disease. The patient has specific risk factors associated  with increased risk of perioperative renal complications due to age greater than 55, male gender.    Musculoskeletal  No diagnoses or significant findings on chart review or clinical presentation and evaluation.    Endocrine  Diabetes Evaluation  The patient has no history of diabetes mellitus  Thyroid Disease Evaluation  The patient has no history of thyroid disease.    ID  No diagnoses or significant findings on chart review or clinical presentation and evaluation.    -Preoperative medication instructions were provided and reviewed with the patient.  Any additional testing or evaluation was explained to the patient.  NPO Instructions were discussed, and the patient's questions were answered prior to conclusion of this encounter

## 2023-12-22 NOTE — PREPROCEDURE INSTRUCTIONS
NPO Instructions:    Do not eat any food after midnight the night before your surgery/procedure.  You may have clear liquids until TWO hours before surgery/procedure. This includes water, black tea/coffee, (no milk or cream) apple juice and electrolyte drinks (Gatorade).  You may chew gum up to TWO hours before your surgery/procedure.    Additional Instructions:     Seven/Six Days before Surgery:  We have sent a prescription for Hibiclens soap to your preferred pharmacy.  If you have not already, Please  your prescription and start using five days before surgery.  Follow the instruction sheet provided to you at your CPM/PAT appointment.  Review your medication instructions, stop indicated medications  Five Days before Surgery:  Review your medication instructions, stop indicated medications  Begin using your Hibiclens  Three Days before Surgery:  Review your medication instructions, stop indicated medications  The Day before Surgery:  Review your medication instructions, stop indicated medications  You will be contacted regarding the time of your arrival to facility and surgery time  Do not eat any food after Midnight  Day of Surgery:  Review your medication instructions, take indicated medications  You may have clear liquids until TWO hours before surgery/procedure.  This includes water, black tea/coffee, (no milk or cream) apple juice and electrolyte drinks (Gatorade)  You may chew gum up to TWO hours before your surgery/procedure  Wear  comfortable loose fitting clothing  Do not use moisturizers, creams, lotions or perfume  All jewelry and valuables should be left at home

## 2023-12-22 NOTE — H&P (VIEW-ONLY)
"CPM/PAT Evaluation       Name: Jones Howell (Jones Howell \"Samir\")  /Age: 1955/68 y.o.     Visit Type:   In-Person       Chief Complaint: Coronary artery disease of native artery of native heart with stable angina pectoris     HPI  Pt is a 68 year old male with multivessel CAD, reduced EF  and HLD.  Pt is being evaluated in [facility] in anticipation of an Median Sternotomy, and Coronary Artery BYpass Grafting x 2 with Dr. Jimenez on 24.  Past Medical History:   Diagnosis Date    Cataract     Celiac disease     Colitis     Coronary artery disease 2023    Severe 2 vessel CAD in a right dominant system.  Elevated LVEDP.  No evidence of aortic stenosis.    Hyperlipidemia     Lymphocytic colitis        Past Surgical History:   Procedure Laterality Date    CARDIAC CATHETERIZATION N/A 2023    Procedure: Left Heart Cath, With LV;  Surgeon: Javier Ma MD;  Location: [facility] Cardiac Cath Lab;  Service: Cardiovascular;  Laterality: N/A;    LUMBAR SPINE SURGERY         Patient Sexual activity questions deferred to the physician.    Family History   Problem Relation Name Age of Onset    Alzheimer's disease Mother  80    Other (COVID) Father  90       Allergies   Allergen Reactions    Fentanyl Other     Diaphoresis and bradycardia    Lactose Diarrhea       Prior to Admission medications    Medication Sig Start Date End Date Taking? Authorizing Provider   aspirin 81 mg chewable tablet Chew 1 tablet (81 mg) once daily. 23  Michelle Jimenez MD   b complex 0.4 mg tablet Take 1 tablet by mouth once daily.    Historical Provider, MD   cholecalciferol (Vitamin D3) 50 MCG ( UT) tablet Take 1 tablet (50 mcg) by mouth once daily.    Historical Provider, MD   ibuprofen 800 mg tablet Take 600 mg by mouth every 8 hours if needed for mild pain (1 - 3) (soreness after exercising). 23   Historical Provider, MD   losartan (Cozaar) 25 mg tablet Take 1 tablet (25 mg) by mouth once daily. 23 " 11/12/24  Michelle Jimenez MD   nitroglycerin (Nitrostat) 0.4 mg SL tablet Place 1 tablet (0.4 mg) under the tongue every 5 minutes if needed for chest pain. May repeat dose every 5 minutes for up to 3 doses total. 11/13/23 11/12/24  Michelle Jimenez MD   rosuvastatin (Crestor) 20 mg tablet Take 1 tablet (20 mg) by mouth once daily. 11/13/23 11/12/24  Michelle Jimenez MD        PAT ROS:   Constitutional:   neg    Neuro/Psych:   neg    Eyes:   neg    Ears:    tinnitus  Nose:   neg    Mouth:   neg    Throat:   neg    Neck:   neg    Cardio:   neg    Respiratory:   neg    Endocrine:   neg    GI:   neg    :   neg    Musculoskeletal:   neg    Hematologic:   neg    Skin:  neg        Physical Exam  Vitals reviewed. Physical exam within normal limits.          PAT AIRWAY:   Airway:     TM distance::  >3 FB    Neck ROM::  Full  normal        Visit Vitals  /75   Pulse 57   Temp 36.3 °C (97.3 °F)       DASI Risk Score      Flowsheet Row Most Recent Value   DASI SCORE 58.2   METS Score (Will be calculated only when all the questions are answered) 9.9          Caprini DVT Assessment      Flowsheet Row Most Recent Value   DVT Score 9   Current Status Major surgery planned, lasting over 3 hours   History Prior major surgery   Age 60-75 years   BMI 30 or less          Modified Frailty Index      Flowsheet Row Most Recent Value   Modified Frailty Index Calculator .0909          CHADS2 Stroke Risk  Current as of 28 minutes ago        N/A 3 - 100%: High Risk   2 - 3%: Medium Risk   0 - 2%: Low Risk     Last Change: N/A          This score determines the patient's risk of having a stroke if the patient has atrial fibrillation.        This score is not applicable to this patient. Components are not calculated.          Revised Cardiac Risk Index      Flowsheet Row Most Recent Value   Revised Cardiac Risk Calculator 1          Apfel Simplified Score      Flowsheet Row Most Recent Value   Apfel Simplified Score Calculator 2          Risk  Analysis Index Results This Encounter    No data found in the last 1 encounters.       Stop Bang Score      Flowsheet Row Most Recent Value   Do you snore loudly? 1   Do you often feel tired or fatigued after your sleep? 0   Has anyone ever observed you stop breathing in your sleep? 0   Do you have or are you being treated for high blood pressure? 1   Recent BMI (Calculated) 27.8   Is BMI greater than 35 kg/m2? 0=No   Age older than 50 years old? 1=Yes   Gender - Male 1=Yes            Assessment and Plan:     Anesthesia:  The patient denies problems with anesthesia in the past such as PONV, prolonged sedation, awareness, dental damage, aspiration, cardiac arrest, difficult intubation, or unexpected hospital admissions.     Neuro:   The patient has no neurological diagnoses or significant findings on chart review, clinical presentation, and evaluation.  No grossly apparent perioperative risk. The patient is at increased risk for perioperative stroke secondary to cardiac disease, increased age, hyperlipidemia, diabetes mellitus, general anesthesia, cardiac or emergency surgery.    HEENT/Airway  No diagnoses, significant findings on chart review, clinical presentation, or evaluation.    Cardiovascular  The patient is scheduled for cardiac surgery.  Preoperative evaluation is complete pending results of Lab work  RCRI  The patient meets 0-1 RCRI criteria and therefore has a less than 1% risk of major adverse cardiac complications.  METS  The patient's functional capacity capacity is greater than 4 METS.  EKG  The patient has no EKG or echocardiographic changes concerning for myocardial ischemia.  No further cardiac evaluation is indicated  Heart Failure  The patient has no known history of heart failure.  Additionally, the patient reports no symptoms of heart failure and demonstrates no signs of heart failure.  Hypertension Evaluation  The patient has no known history of hypertension and has a normal blood pressure  today.  Heart Rhythm Evaluation  The patient has no history of arrhythmias.  Heart Valve Evaluation  The patient has no known history of valvular heart disease. The patient has no symptoms or physical exam findings to suggest valvular heart disease.  CARDS EVAL  The patient follows with cardiology, Dr. Jimenez. Patient was last seen 12-18-23. Per note, Pt scheduled for CABG.  The patient has a 30-day risk for MACE of 1 predictor, 6.0% risk for cardiac death, nonfatal myocardial infarction, and nonfatal cardiac arrest.  DENIS score which indicates a 0.0% risk of intraoperative or 30-day postoperative.    Pulmonary   No significant findings on chart review or clinical presentation and evaluation. The patient is at increased risk of perioperative pulmonary complications secondary to thoracic surgery, advanced age greater than 60.  The patient has a stop bang score of 3, which places patient at intermediate risk for having GENO.  ARISCAT 50, High, 42.1% risk of in-hospital postoperative pulmonary complications  PRODIGY 16, high of respiratory depression episode.    Hematology  No diagnoses or significant findings on chart review or clinical presentation and evaluation.  Antiplatelet management   The patient is currently receiving antiplatelet therapy for CAD.  Anticoagulation management  The patient is not currently receiving anticoagulation therapy.  Caprini score 9, high risk of perioperative VTE  Transfusion Evaluation  A type and screen was obtained given the likelihood for perioperative transfusion of blood or blood products.    GI  No diagnoses or significant findings on chart review or clinical presentation and evaluation.  Eat 10- 0,  self-perceived oropharyngeal dysphagia scale (0-40)     Genitourinary  No diagnoses or significant findings on chart review or clinical presentation and evaluation.    Renal  The patient has no known history of chronic kidney disease. The patient has specific risk factors associated  with increased risk of perioperative renal complications due to age greater than 55, male gender.    Musculoskeletal  No diagnoses or significant findings on chart review or clinical presentation and evaluation.    Endocrine  Diabetes Evaluation  The patient has no history of diabetes mellitus  Thyroid Disease Evaluation  The patient has no history of thyroid disease.    ID  No diagnoses or significant findings on chart review or clinical presentation and evaluation.    -Preoperative medication instructions were provided and reviewed with the patient.  Any additional testing or evaluation was explained to the patient.  NPO Instructions were discussed, and the patient's questions were answered prior to conclusion of this encounter

## 2023-12-23 LAB
APPEARANCE UR: CLEAR
BILIRUB UR STRIP.AUTO-MCNC: NEGATIVE MG/DL
COLOR UR: YELLOW
GLUCOSE UR STRIP.AUTO-MCNC: NEGATIVE MG/DL
KETONES UR STRIP.AUTO-MCNC: NEGATIVE MG/DL
LEUKOCYTE ESTERASE UR QL STRIP.AUTO: NEGATIVE
NITRITE UR QL STRIP.AUTO: NEGATIVE
PH UR STRIP.AUTO: 7 [PH]
PROT UR STRIP.AUTO-MCNC: NEGATIVE MG/DL
RBC # UR STRIP.AUTO: NEGATIVE /UL
SP GR UR STRIP.AUTO: 1.02
STAPHYLOCOCCUS SPEC CULT: NORMAL
UROBILINOGEN UR STRIP.AUTO-MCNC: <2 MG/DL

## 2023-12-23 PROCEDURE — 81003 URINALYSIS AUTO W/O SCOPE: CPT | Performed by: NURSE PRACTITIONER

## 2023-12-26 LAB — HOLD SPECIMEN: NORMAL

## 2024-01-03 ENCOUNTER — ANESTHESIA EVENT (OUTPATIENT)
Dept: OPERATING ROOM | Facility: HOSPITAL | Age: 69
DRG: 236 | End: 2024-01-03
Payer: COMMERCIAL

## 2024-01-04 ENCOUNTER — ANESTHESIA (OUTPATIENT)
Dept: OPERATING ROOM | Facility: HOSPITAL | Age: 69
DRG: 236 | End: 2024-01-04
Payer: COMMERCIAL

## 2024-01-04 ENCOUNTER — HOSPITAL ENCOUNTER (INPATIENT)
Facility: HOSPITAL | Age: 69
LOS: 5 days | Discharge: HOME HEALTH | DRG: 236 | End: 2024-01-09
Attending: THORACIC SURGERY (CARDIOTHORACIC VASCULAR SURGERY) | Admitting: THORACIC SURGERY (CARDIOTHORACIC VASCULAR SURGERY)
Payer: COMMERCIAL

## 2024-01-04 ENCOUNTER — HOSPITAL ENCOUNTER (OUTPATIENT)
Dept: OPERATING ROOM | Facility: HOSPITAL | Age: 69
Discharge: HOME | End: 2024-01-04

## 2024-01-04 ENCOUNTER — APPOINTMENT (OUTPATIENT)
Dept: RADIOLOGY | Facility: HOSPITAL | Age: 69
DRG: 236 | End: 2024-01-04
Payer: COMMERCIAL

## 2024-01-04 DIAGNOSIS — I25.10 2-VESSEL CORONARY ARTERY DISEASE: Primary | ICD-10-CM

## 2024-01-04 DIAGNOSIS — I25.118 CORONARY ARTERY DISEASE OF NATIVE ARTERY OF NATIVE HEART WITH STABLE ANGINA PECTORIS (CMS-HCC): ICD-10-CM

## 2024-01-04 DIAGNOSIS — Z95.1 S/P CABG (CORONARY ARTERY BYPASS GRAFT): ICD-10-CM

## 2024-01-04 LAB
ABO GROUP (TYPE) IN BLOOD: NORMAL
ABO GROUP (TYPE) IN BLOOD: NORMAL
ALBUMIN SERPL BCP-MCNC: 3.8 G/DL (ref 3.4–5)
ANION GAP BLDA CALCULATED.4IONS-SCNC: 10 MMO/L (ref 10–25)
ANION GAP BLDA CALCULATED.4IONS-SCNC: 10 MMO/L (ref 10–25)
ANION GAP BLDA CALCULATED.4IONS-SCNC: 11 MMO/L (ref 10–25)
ANION GAP BLDA CALCULATED.4IONS-SCNC: 12 MMO/L (ref 10–25)
ANION GAP BLDA CALCULATED.4IONS-SCNC: 12 MMO/L (ref 10–25)
ANION GAP BLDA CALCULATED.4IONS-SCNC: 13 MMO/L (ref 10–25)
ANION GAP BLDA CALCULATED.4IONS-SCNC: 14 MMO/L (ref 10–25)
ANION GAP BLDA CALCULATED.4IONS-SCNC: 9 MMO/L (ref 10–25)
ANION GAP BLDA CALCULATED.4IONS-SCNC: 9 MMO/L (ref 10–25)
ANION GAP BLDV CALCULATED.4IONS-SCNC: 12 MMOL/L (ref 10–25)
ANION GAP SERPL CALC-SCNC: 16 MMOL/L (ref 10–20)
ANTIBODY SCREEN: NORMAL
APTT PPP: 24 SECONDS (ref 27–38)
BASE EXCESS BLDA CALC-SCNC: -0.4 MMOL/L (ref -2–3)
BASE EXCESS BLDA CALC-SCNC: -0.5 MMOL/L (ref -2–3)
BASE EXCESS BLDA CALC-SCNC: -1.9 MMOL/L (ref -2–3)
BASE EXCESS BLDA CALC-SCNC: -2.9 MMOL/L (ref -2–3)
BASE EXCESS BLDA CALC-SCNC: -3 MMOL/L (ref -2–3)
BASE EXCESS BLDA CALC-SCNC: -3.6 MMOL/L (ref -2–3)
BASE EXCESS BLDA CALC-SCNC: -3.9 MMOL/L (ref -2–3)
BASE EXCESS BLDA CALC-SCNC: -4.1 MMOL/L (ref -2–3)
BASE EXCESS BLDA CALC-SCNC: -4.5 MMOL/L (ref -2–3)
BASE EXCESS BLDA CALC-SCNC: -5.9 MMOL/L (ref -2–3)
BASE EXCESS BLDA CALC-SCNC: 1.5 MMOL/L (ref -2–3)
BASE EXCESS BLDA CALC-SCNC: 2.6 MMOL/L (ref -2–3)
BASE EXCESS BLDV CALC-SCNC: -0.9 MMOL/L (ref -2–3)
BODY TEMPERATURE: 37 DEGREES CELSIUS
BUN SERPL-MCNC: 16 MG/DL (ref 6–23)
CA-I BLD-SCNC: 1.06 MMOL/L (ref 1.1–1.33)
CA-I BLDA-SCNC: 1.01 MMOL/L (ref 1.1–1.33)
CA-I BLDA-SCNC: 1.06 MMOL/L (ref 1.1–1.33)
CA-I BLDA-SCNC: 1.07 MMOL/L (ref 1.1–1.33)
CA-I BLDA-SCNC: 1.08 MMOL/L (ref 1.1–1.33)
CA-I BLDA-SCNC: 1.11 MMOL/L (ref 1.1–1.33)
CA-I BLDA-SCNC: 1.14 MMOL/L (ref 1.1–1.33)
CA-I BLDA-SCNC: 1.14 MMOL/L (ref 1.1–1.33)
CA-I BLDA-SCNC: 1.15 MMOL/L (ref 1.1–1.33)
CA-I BLDA-SCNC: 1.15 MMOL/L (ref 1.1–1.33)
CA-I BLDA-SCNC: 1.18 MMOL/L (ref 1.1–1.33)
CA-I BLDA-SCNC: 1.19 MMOL/L (ref 1.1–1.33)
CA-I BLDA-SCNC: 1.23 MMOL/L (ref 1.1–1.33)
CA-I BLDV-SCNC: 1.17 MMOL/L (ref 1.1–1.33)
CALCIUM SERPL-MCNC: 8.2 MG/DL (ref 8.6–10.6)
CFT BLD TEG: 1.3 MIN (ref 0.8–2.1)
CFT BLD TEG: 1.9 MIN (ref 0.8–2.1)
CHLORIDE BLDA-SCNC: 105 MMOL/L (ref 98–107)
CHLORIDE BLDA-SCNC: 105 MMOL/L (ref 98–107)
CHLORIDE BLDA-SCNC: 108 MMOL/L (ref 98–107)
CHLORIDE BLDA-SCNC: 109 MMOL/L (ref 98–107)
CHLORIDE BLDA-SCNC: 110 MMOL/L (ref 98–107)
CHLORIDE BLDA-SCNC: 110 MMOL/L (ref 98–107)
CHLORIDE BLDA-SCNC: 111 MMOL/L (ref 98–107)
CHLORIDE BLDA-SCNC: 111 MMOL/L (ref 98–107)
CHLORIDE BLDA-SCNC: 112 MMOL/L (ref 98–107)
CHLORIDE BLDA-SCNC: 112 MMOL/L (ref 98–107)
CHLORIDE BLDA-SCNC: 114 MMOL/L (ref 98–107)
CHLORIDE BLDA-SCNC: 114 MMOL/L (ref 98–107)
CHLORIDE BLDV-SCNC: 108 MMOL/L (ref 98–107)
CHLORIDE SERPL-SCNC: 110 MMOL/L (ref 98–107)
CLOT ANGLE BLD TEG: 67 DEG (ref 63–78)
CLOT ANGLE BLD TEG: 73 DEG (ref 63–78)
CLOT INIT BLD TEG: 6.6 MIN (ref 4.6–9.1)
CLOT INIT BLD TEG: 9 MIN (ref 4.6–9.1)
CLOT INIT P HPASE BLD TEG: 6 MIN (ref 4.3–8.3)
CLOT INIT P HPASE BLD TEG: 7.6 MIN (ref 4.3–8.3)
CO2 SERPL-SCNC: 20 MMOL/L (ref 21–32)
COHGB MFR BLDA: 0.5 %
COHGB MFR BLDA: 0.6 %
COHGB MFR BLDA: 0.7 %
COHGB MFR BLDA: 0.8 %
COHGB MFR BLDA: 1 %
COHGB MFR BLDV: 1.3 %
CREAT SERPL-MCNC: 0.99 MG/DL (ref 0.5–1.3)
DO-HGB MFR BLDA: 0 % (ref 0–5)
DO-HGB MFR BLDA: 0.2 % (ref 0–5)
DO-HGB MFR BLDA: 0.2 % (ref 0–5)
DO-HGB MFR BLDA: 0.4 % (ref 0–5)
DO-HGB MFR BLDA: 0.6 % (ref 0–5)
DO-HGB MFR BLDA: 0.7 % (ref 0–5)
DO-HGB MFR BLDA: 3.4 % (ref 0–5)
ERYTHROCYTE [DISTWIDTH] IN BLOOD BY AUTOMATED COUNT: 12.4 % (ref 11.5–14.5)
FIBRINOGEN BLD CALC-MCNC: 341 MG/DL (ref 278–581)
FIBRINOGEN BLD CALC-MCNC: 374 MG/DL (ref 278–581)
FIBRINOGEN PPP-MCNC: 184 MG/DL (ref 200–400)
GFR SERPL CREATININE-BSD FRML MDRD: 84 ML/MIN/1.73M*2
GLUCOSE BLD MANUAL STRIP-MCNC: 128 MG/DL (ref 74–99)
GLUCOSE BLDA-MCNC: 109 MG/DL (ref 74–99)
GLUCOSE BLDA-MCNC: 118 MG/DL (ref 74–99)
GLUCOSE BLDA-MCNC: 120 MG/DL (ref 74–99)
GLUCOSE BLDA-MCNC: 125 MG/DL (ref 74–99)
GLUCOSE BLDA-MCNC: 127 MG/DL (ref 74–99)
GLUCOSE BLDA-MCNC: 127 MG/DL (ref 74–99)
GLUCOSE BLDA-MCNC: 152 MG/DL (ref 74–99)
GLUCOSE BLDA-MCNC: 161 MG/DL (ref 74–99)
GLUCOSE BLDA-MCNC: 171 MG/DL (ref 74–99)
GLUCOSE BLDA-MCNC: 73 MG/DL (ref 74–99)
GLUCOSE BLDA-MCNC: 86 MG/DL (ref 74–99)
GLUCOSE BLDA-MCNC: 94 MG/DL (ref 74–99)
GLUCOSE BLDV-MCNC: 122 MG/DL (ref 74–99)
GLUCOSE SERPL-MCNC: 174 MG/DL (ref 74–99)
HCO3 BLDA-SCNC: 19.5 MMOL/L (ref 22–26)
HCO3 BLDA-SCNC: 20.3 MMOL/L (ref 22–26)
HCO3 BLDA-SCNC: 21.1 MMOL/L (ref 22–26)
HCO3 BLDA-SCNC: 21.6 MMOL/L (ref 22–26)
HCO3 BLDA-SCNC: 22 MMOL/L (ref 22–26)
HCO3 BLDA-SCNC: 22.2 MMOL/L (ref 22–26)
HCO3 BLDA-SCNC: 22.7 MMOL/L (ref 22–26)
HCO3 BLDA-SCNC: 23.7 MMOL/L (ref 22–26)
HCO3 BLDA-SCNC: 24.8 MMOL/L (ref 22–26)
HCO3 BLDA-SCNC: 24.9 MMOL/L (ref 22–26)
HCO3 BLDA-SCNC: 26.2 MMOL/L (ref 22–26)
HCO3 BLDA-SCNC: 26.6 MMOL/L (ref 22–26)
HCO3 BLDV-SCNC: 25.3 MMOL/L (ref 22–26)
HCT VFR BLD AUTO: 32.6 % (ref 41–52)
HCT VFR BLD EST: 28 % (ref 41–52)
HCT VFR BLD EST: 28 % (ref 41–52)
HCT VFR BLD EST: 29 % (ref 41–52)
HCT VFR BLD EST: 30 % (ref 41–52)
HCT VFR BLD EST: 31 % (ref 41–52)
HCT VFR BLD EST: 32 % (ref 41–52)
HCT VFR BLD EST: 33 % (ref 41–52)
HCT VFR BLD EST: 35 % (ref 41–52)
HCT VFR BLD EST: 35 % (ref 41–52)
HCT VFR BLD EST: 38 % (ref 41–52)
HGB BLD-MCNC: 10.8 G/DL (ref 13.5–17.5)
HGB BLDA-MCNC: 10 G/DL (ref 13.5–17.5)
HGB BLDA-MCNC: 10 G/DL (ref 13.5–17.5)
HGB BLDA-MCNC: 10.4 G/DL (ref 13.5–17.5)
HGB BLDA-MCNC: 10.4 G/DL (ref 13.5–17.5)
HGB BLDA-MCNC: 10.6 G/DL (ref 13.5–17.5)
HGB BLDA-MCNC: 11 G/DL (ref 13.5–17.5)
HGB BLDA-MCNC: 11.5 G/DL (ref 13.5–17.5)
HGB BLDA-MCNC: 12.5 G/DL (ref 13.5–17.5)
HGB BLDA-MCNC: 12.5 G/DL (ref 13.5–17.5)
HGB BLDA-MCNC: 9.3 G/DL (ref 13.5–17.5)
HGB BLDA-MCNC: 9.3 G/DL (ref 13.5–17.5)
HGB BLDA-MCNC: 9.4 G/DL (ref 13.5–17.5)
HGB BLDA-MCNC: 9.4 G/DL (ref 13.5–17.5)
HGB BLDA-MCNC: 9.6 G/DL (ref 13.5–17.5)
HGB BLDA-MCNC: 9.6 G/DL (ref 13.5–17.5)
HGB BLDA-MCNC: 9.8 G/DL (ref 13.5–17.5)
HGB BLDV-MCNC: 9.7 G/DL (ref 13.5–17.5)
INHALED O2 CONCENTRATION: 100 %
INHALED O2 CONCENTRATION: 100 %
INHALED O2 CONCENTRATION: 21 %
INHALED O2 CONCENTRATION: 40 %
INHALED O2 CONCENTRATION: 40 %
INHALED O2 CONCENTRATION: 60 %
INHALED O2 CONCENTRATION: 80 %
INR PPP: 1.3 (ref 0.9–1.1)
LACTATE BLDA-SCNC: 0.7 MMOL/L (ref 0.4–2)
LACTATE BLDA-SCNC: 0.8 MMOL/L (ref 0.4–2)
LACTATE BLDA-SCNC: 0.9 MMOL/L (ref 0.4–2)
LACTATE BLDA-SCNC: 1 MMOL/L (ref 0.4–2)
LACTATE BLDA-SCNC: 1 MMOL/L (ref 0.4–2)
LACTATE BLDA-SCNC: 1.1 MMOL/L (ref 0.4–2)
LACTATE BLDA-SCNC: 1.4 MMOL/L (ref 0.4–2)
LACTATE BLDA-SCNC: 1.6 MMOL/L (ref 0.4–2)
LACTATE BLDA-SCNC: 1.9 MMOL/L (ref 0.4–2)
LACTATE BLDA-SCNC: 3.2 MMOL/L (ref 0.4–2)
LACTATE BLDV-SCNC: 0.8 MMOL/L (ref 0.4–2)
MAGNESIUM SERPL-MCNC: 2.63 MG/DL (ref 1.6–2.4)
MCF BLD TEG: 19 MM (ref 15–32)
MCF BLD TEG: 20 MM (ref 15–32)
MCF BLD TEG: 57 MM (ref 52–69)
MCF BLD TEG: 57 MM (ref 52–69)
MCF BLD TEG: 57 MM (ref 52–70)
MCF BLD TEG: 63 MM (ref 52–70)
MCH RBC QN AUTO: 28.6 PG (ref 26–34)
MCHC RBC AUTO-ENTMCNC: 33.1 G/DL (ref 32–36)
MCV RBC AUTO: 87 FL (ref 80–100)
METHGB MFR BLDA: 0 % (ref 0–1.5)
METHGB MFR BLDA: 0.4 % (ref 0–1.5)
METHGB MFR BLDA: 0.5 % (ref 0–1.5)
METHGB MFR BLDA: 0.6 % (ref 0–1.5)
METHGB MFR BLDA: 0.6 % (ref 0–1.5)
METHGB MFR BLDA: 0.7 % (ref 0–1.5)
METHGB MFR BLDA: 0.9 % (ref 0–1.5)
METHGB MFR BLDA: 0.9 % (ref 0–1.5)
METHGB MFR BLDA: 1.1 % (ref 0–1.5)
METHGB MFR BLDV: 0.2 % (ref 0–1.5)
NRBC BLD-RTO: 0 /100 WBCS (ref 0–0)
OXYHGB MFR BLDA: 94.6 % (ref 94–98)
OXYHGB MFR BLDA: 94.6 % (ref 94–98)
OXYHGB MFR BLDA: 97.1 % (ref 94–98)
OXYHGB MFR BLDA: 97.1 % (ref 94–98)
OXYHGB MFR BLDA: 97.7 % (ref 94–98)
OXYHGB MFR BLDA: 97.8 % (ref 94–98)
OXYHGB MFR BLDA: 97.8 % (ref 94–98)
OXYHGB MFR BLDA: 97.9 % (ref 94–98)
OXYHGB MFR BLDA: 97.9 % (ref 94–98)
OXYHGB MFR BLDA: 98.1 % (ref 94–98)
OXYHGB MFR BLDA: 98.1 % (ref 94–98)
OXYHGB MFR BLDA: 98.4 % (ref 94–98)
OXYHGB MFR BLDA: 98.4 % (ref 94–98)
OXYHGB MFR BLDA: 98.6 % (ref 94–98)
OXYHGB MFR BLDA: 98.6 % (ref 94–98)
OXYHGB MFR BLDA: 98.7 % (ref 94–98)
OXYHGB MFR BLDA: 98.7 % (ref 94–98)
OXYHGB MFR BLDA: 99 % (ref 94–98)
OXYHGB MFR BLDA: 99 % (ref 94–98)
OXYHGB MFR BLDV: 79.5 % (ref 45–75)
PCO2 BLDA: 32 MM HG (ref 38–42)
PCO2 BLDA: 36 MM HG (ref 38–42)
PCO2 BLDA: 37 MM HG (ref 38–42)
PCO2 BLDA: 38 MM HG (ref 38–42)
PCO2 BLDA: 40 MM HG (ref 38–42)
PCO2 BLDA: 41 MM HG (ref 38–42)
PCO2 BLDA: 42 MM HG (ref 38–42)
PCO2 BLDA: 42 MM HG (ref 38–42)
PCO2 BLDA: 43 MM HG (ref 38–42)
PCO2 BLDA: 44 MM HG (ref 38–42)
PCO2 BLDV: 48 MM HG (ref 41–51)
PH BLDA: 7.31 PH (ref 7.38–7.42)
PH BLDA: 7.33 PH (ref 7.38–7.42)
PH BLDA: 7.34 PH (ref 7.38–7.42)
PH BLDA: 7.35 PH (ref 7.38–7.42)
PH BLDA: 7.37 PH (ref 7.38–7.42)
PH BLDA: 7.37 PH (ref 7.38–7.42)
PH BLDA: 7.38 PH (ref 7.38–7.42)
PH BLDA: 7.4 PH (ref 7.38–7.42)
PH BLDA: 7.41 PH (ref 7.38–7.42)
PH BLDA: 7.47 PH (ref 7.38–7.42)
PH BLDV: 7.33 PH (ref 7.33–7.43)
PHOSPHATE SERPL-MCNC: 3.3 MG/DL (ref 2.5–4.9)
PLATELET # BLD AUTO: 192 X10*3/UL (ref 150–450)
PO2 BLDA: 121 MM HG (ref 85–95)
PO2 BLDA: 145 MM HG (ref 85–95)
PO2 BLDA: 161 MM HG (ref 85–95)
PO2 BLDA: 278 MM HG (ref 85–95)
PO2 BLDA: 288 MM HG (ref 85–95)
PO2 BLDA: 303 MM HG (ref 85–95)
PO2 BLDA: 314 MM HG (ref 85–95)
PO2 BLDA: 319 MM HG (ref 85–95)
PO2 BLDA: 329 MM HG (ref 85–95)
PO2 BLDA: 342 MM HG (ref 85–95)
PO2 BLDA: 389 MM HG (ref 85–95)
PO2 BLDA: 77 MM HG (ref 85–95)
PO2 BLDV: 49 MM HG (ref 35–45)
POTASSIUM BLDA-SCNC: 3.8 MMOL/L (ref 3.5–5.3)
POTASSIUM BLDA-SCNC: 4.2 MMOL/L (ref 3.5–5.3)
POTASSIUM BLDA-SCNC: 4.3 MMOL/L (ref 3.5–5.3)
POTASSIUM BLDA-SCNC: 4.3 MMOL/L (ref 3.5–5.3)
POTASSIUM BLDA-SCNC: 4.4 MMOL/L (ref 3.5–5.3)
POTASSIUM BLDA-SCNC: 4.7 MMOL/L (ref 3.5–5.3)
POTASSIUM BLDA-SCNC: 5.2 MMOL/L (ref 3.5–5.3)
POTASSIUM BLDA-SCNC: 5.4 MMOL/L (ref 3.5–5.3)
POTASSIUM BLDA-SCNC: 5.4 MMOL/L (ref 3.5–5.3)
POTASSIUM BLDA-SCNC: 5.6 MMOL/L (ref 3.5–5.3)
POTASSIUM BLDA-SCNC: 6 MMOL/L (ref 3.5–5.3)
POTASSIUM BLDA-SCNC: 6.9 MMOL/L (ref 3.5–5.3)
POTASSIUM BLDV-SCNC: 6.8 MMOL/L (ref 3.5–5.3)
POTASSIUM SERPL-SCNC: 4.3 MMOL/L (ref 3.5–5.3)
PROTHROMBIN TIME: 14.8 SECONDS (ref 9.8–12.8)
RBC # BLD AUTO: 3.77 X10*6/UL (ref 4.5–5.9)
RH FACTOR (ANTIGEN D): NORMAL
RH FACTOR (ANTIGEN D): NORMAL
SAO2 % BLDA: 100 % (ref 94–100)
SAO2 % BLDA: 97 % (ref 94–100)
SAO2 % BLDA: 99 % (ref 94–100)
SAO2 % BLDV: 81 % (ref 45–75)
SODIUM BLDA-SCNC: 136 MMOL/L (ref 136–145)
SODIUM BLDA-SCNC: 137 MMOL/L (ref 136–145)
SODIUM BLDA-SCNC: 138 MMOL/L (ref 136–145)
SODIUM BLDA-SCNC: 139 MMOL/L (ref 136–145)
SODIUM BLDA-SCNC: 140 MMOL/L (ref 136–145)
SODIUM BLDA-SCNC: 141 MMOL/L (ref 136–145)
SODIUM BLDA-SCNC: 141 MMOL/L (ref 136–145)
SODIUM BLDA-SCNC: 142 MMOL/L (ref 136–145)
SODIUM BLDV-SCNC: 138 MMOL/L (ref 136–145)
SODIUM SERPL-SCNC: 142 MMOL/L (ref 136–145)
TEST COMMENT: NORMAL
TEST COMMENT: NORMAL
WBC # BLD AUTO: 16.8 X10*3/UL (ref 4.4–11.3)

## 2024-01-04 PROCEDURE — 84132 ASSAY OF SERUM POTASSIUM: CPT

## 2024-01-04 PROCEDURE — 71045 X-RAY EXAM CHEST 1 VIEW: CPT | Performed by: RADIOLOGY

## 2024-01-04 PROCEDURE — 2500000004 HC RX 250 GENERAL PHARMACY W/ HCPCS (ALT 636 FOR OP/ED): Performed by: THORACIC SURGERY (CARDIOTHORACIC VASCULAR SURGERY)

## 2024-01-04 PROCEDURE — 2500000002 HC RX 250 W HCPCS SELF ADMINISTERED DRUGS (ALT 637 FOR MEDICARE OP, ALT 636 FOR OP/ED)

## 2024-01-04 PROCEDURE — 3600000011 HC PERFUSION TIME - INITIAL BASE CHARGE: Performed by: THORACIC SURGERY (CARDIOTHORACIC VASCULAR SURGERY)

## 2024-01-04 PROCEDURE — A33534 PR CABG, ARTERIAL, TWO

## 2024-01-04 PROCEDURE — 36556 INSERT NON-TUNNEL CV CATH: CPT | Performed by: ANESTHESIOLOGY

## 2024-01-04 PROCEDURE — 2500000004 HC RX 250 GENERAL PHARMACY W/ HCPCS (ALT 636 FOR OP/ED)

## 2024-01-04 PROCEDURE — 2780000003 HC OR 278 NO HCPCS: Performed by: THORACIC SURGERY (CARDIOTHORACIC VASCULAR SURGERY)

## 2024-01-04 PROCEDURE — 3600000018 HC OR TIME - INITIAL BASE CHARGE - PROCEDURE LEVEL SIX: Performed by: THORACIC SURGERY (CARDIOTHORACIC VASCULAR SURGERY)

## 2024-01-04 PROCEDURE — 83735 ASSAY OF MAGNESIUM: CPT

## 2024-01-04 PROCEDURE — 85347 COAGULATION TIME ACTIVATED: CPT

## 2024-01-04 PROCEDURE — 33509 NDSC HRV UXTR ART 1 SGM CAB: CPT | Performed by: THORACIC SURGERY (CARDIOTHORACIC VASCULAR SURGERY)

## 2024-01-04 PROCEDURE — 86850 RBC ANTIBODY SCREEN: CPT

## 2024-01-04 PROCEDURE — 2500000005 HC RX 250 GENERAL PHARMACY W/O HCPCS: Performed by: THORACIC SURGERY (CARDIOTHORACIC VASCULAR SURGERY)

## 2024-01-04 PROCEDURE — 2500000005 HC RX 250 GENERAL PHARMACY W/O HCPCS

## 2024-01-04 PROCEDURE — A33534 PR CABG, ARTERIAL, TWO: Performed by: ANESTHESIOLOGY

## 2024-01-04 PROCEDURE — P9045 ALBUMIN (HUMAN), 5%, 250 ML: HCPCS | Mod: JZ

## 2024-01-04 PROCEDURE — 2020000001 HC ICU ROOM DAILY

## 2024-01-04 PROCEDURE — 3700000001 HC GENERAL ANESTHESIA TIME - INITIAL BASE CHARGE: Performed by: THORACIC SURGERY (CARDIOTHORACIC VASCULAR SURGERY)

## 2024-01-04 PROCEDURE — 82330 ASSAY OF CALCIUM: CPT

## 2024-01-04 PROCEDURE — A4649 SURGICAL SUPPLIES: HCPCS | Performed by: THORACIC SURGERY (CARDIOTHORACIC VASCULAR SURGERY)

## 2024-01-04 PROCEDURE — 82947 ASSAY GLUCOSE BLOOD QUANT: CPT

## 2024-01-04 PROCEDURE — 02100ZC BYPASS CORONARY ARTERY, ONE ARTERY FROM THORACIC ARTERY, OPEN APPROACH: ICD-10-PCS | Performed by: THORACIC SURGERY (CARDIOTHORACIC VASCULAR SURGERY)

## 2024-01-04 PROCEDURE — 85384 FIBRINOGEN ACTIVITY: CPT

## 2024-01-04 PROCEDURE — 2720000007 HC OR 272 NO HCPCS: Performed by: THORACIC SURGERY (CARDIOTHORACIC VASCULAR SURGERY)

## 2024-01-04 PROCEDURE — 71045 X-RAY EXAM CHEST 1 VIEW: CPT

## 2024-01-04 PROCEDURE — 36415 COLL VENOUS BLD VENIPUNCTURE: CPT

## 2024-01-04 PROCEDURE — 64488 TAP BLOCK BI INJECTION: CPT | Performed by: ANESTHESIOLOGY

## 2024-01-04 PROCEDURE — 82375 ASSAY CARBOXYHB QUANT: CPT

## 2024-01-04 PROCEDURE — 85610 PROTHROMBIN TIME: CPT

## 2024-01-04 PROCEDURE — 36620 INSERTION CATHETER ARTERY: CPT | Performed by: ANESTHESIOLOGY

## 2024-01-04 PROCEDURE — 2500000004 HC RX 250 GENERAL PHARMACY W/ HCPCS (ALT 636 FOR OP/ED): Mod: JZ

## 2024-01-04 PROCEDURE — 33534 CABG ARTERIAL TWO: CPT | Performed by: THORACIC SURGERY (CARDIOTHORACIC VASCULAR SURGERY)

## 2024-01-04 PROCEDURE — 76937 US GUIDE VASCULAR ACCESS: CPT | Performed by: ANESTHESIOLOGY

## 2024-01-04 PROCEDURE — 3600000017 HC OR TIME - EACH INCREMENTAL 1 MINUTE - PROCEDURE LEVEL SIX: Performed by: THORACIC SURGERY (CARDIOTHORACIC VASCULAR SURGERY)

## 2024-01-04 PROCEDURE — 3700000002 HC GENERAL ANESTHESIA TIME - EACH INCREMENTAL 1 MINUTE: Performed by: THORACIC SURGERY (CARDIOTHORACIC VASCULAR SURGERY)

## 2024-01-04 PROCEDURE — 99291 CRITICAL CARE FIRST HOUR: CPT | Performed by: NURSE PRACTITIONER

## 2024-01-04 PROCEDURE — 2500000001 HC RX 250 WO HCPCS SELF ADMINISTERED DRUGS (ALT 637 FOR MEDICARE OP)

## 2024-01-04 PROCEDURE — 37799 UNLISTED PX VASCULAR SURGERY: CPT

## 2024-01-04 PROCEDURE — 94002 VENT MGMT INPAT INIT DAY: CPT

## 2024-01-04 PROCEDURE — 99291 CRITICAL CARE FIRST HOUR: CPT | Performed by: ANESTHESIOLOGY

## 2024-01-04 PROCEDURE — 2500000004 HC RX 250 GENERAL PHARMACY W/ HCPCS (ALT 636 FOR OP/ED): Performed by: ANESTHESIOLOGY

## 2024-01-04 PROCEDURE — 99223 1ST HOSP IP/OBS HIGH 75: CPT | Performed by: STUDENT IN AN ORGANIZED HEALTH CARE EDUCATION/TRAINING PROGRAM

## 2024-01-04 PROCEDURE — 85027 COMPLETE CBC AUTOMATED: CPT

## 2024-01-04 PROCEDURE — 3600000012 HC PERFUSION TIME - EACH INCREMENTAL 1 MINUTE: Performed by: THORACIC SURGERY (CARDIOTHORACIC VASCULAR SURGERY)

## 2024-01-04 PROCEDURE — 02100AW BYPASS CORONARY ARTERY, ONE ARTERY FROM AORTA WITH AUTOLOGOUS ARTERIAL TISSUE, OPEN APPROACH: ICD-10-PCS | Performed by: THORACIC SURGERY (CARDIOTHORACIC VASCULAR SURGERY)

## 2024-01-04 PROCEDURE — 03BC4ZZ EXCISION OF LEFT RADIAL ARTERY, PERCUTANEOUS ENDOSCOPIC APPROACH: ICD-10-PCS | Performed by: THORACIC SURGERY (CARDIOTHORACIC VASCULAR SURGERY)

## 2024-01-04 RX ORDER — MAGNESIUM SULFATE 1 G/100ML
1 INJECTION INTRAVENOUS EVERY 6 HOURS PRN
Status: DISCONTINUED | OUTPATIENT
Start: 2024-01-04 | End: 2024-01-05

## 2024-01-04 RX ORDER — PROPOFOL 10 MG/ML
5-20 INJECTION, EMULSION INTRAVENOUS CONTINUOUS
Status: DISCONTINUED | OUTPATIENT
Start: 2024-01-04 | End: 2024-01-04

## 2024-01-04 RX ORDER — SODIUM CHLORIDE, SODIUM LACTATE, POTASSIUM CHLORIDE, CALCIUM CHLORIDE 600; 310; 30; 20 MG/100ML; MG/100ML; MG/100ML; MG/100ML
30 INJECTION, SOLUTION INTRAVENOUS CONTINUOUS
Status: DISCONTINUED | OUTPATIENT
Start: 2024-01-04 | End: 2024-01-05

## 2024-01-04 RX ORDER — NITROGLYCERIN 20 MG/100ML
INJECTION INTRAVENOUS CONTINUOUS PRN
Status: DISCONTINUED | OUTPATIENT
Start: 2024-01-04 | End: 2024-01-04

## 2024-01-04 RX ORDER — SODIUM CHLORIDE, SODIUM GLUCONATE, SODIUM ACETATE, POTASSIUM CHLORIDE AND MAGNESIUM CHLORIDE 30; 37; 368; 526; 502 MG/100ML; MG/100ML; MG/100ML; MG/100ML; MG/100ML
INJECTION, SOLUTION INTRAVENOUS CONTINUOUS PRN
Status: DISCONTINUED | OUTPATIENT
Start: 2024-01-04 | End: 2024-01-04

## 2024-01-04 RX ORDER — HEPARIN SODIUM 1000 [USP'U]/ML
INJECTION, SOLUTION INTRAVENOUS; SUBCUTANEOUS AS NEEDED
Status: DISCONTINUED | OUTPATIENT
Start: 2024-01-04 | End: 2024-01-04 | Stop reason: HOSPADM

## 2024-01-04 RX ORDER — VANCOMYCIN HYDROCHLORIDE 1 G/20ML
INJECTION, POWDER, LYOPHILIZED, FOR SOLUTION INTRAVENOUS AS NEEDED
Status: DISCONTINUED | OUTPATIENT
Start: 2024-01-04 | End: 2024-01-04 | Stop reason: HOSPADM

## 2024-01-04 RX ORDER — INSULIN LISPRO 100 [IU]/ML
0-15 INJECTION, SOLUTION INTRAVENOUS; SUBCUTANEOUS EVERY 4 HOURS
Status: DISCONTINUED | OUTPATIENT
Start: 2024-01-04 | End: 2024-01-05

## 2024-01-04 RX ORDER — PANTOPRAZOLE SODIUM 40 MG/1
40 TABLET, DELAYED RELEASE ORAL
Status: DISCONTINUED | OUTPATIENT
Start: 2024-01-05 | End: 2024-01-04

## 2024-01-04 RX ORDER — NITROGLYCERIN 20 MG/100ML
5-200 INJECTION INTRAVENOUS CONTINUOUS
Status: DISCONTINUED | OUTPATIENT
Start: 2024-01-04 | End: 2024-01-05

## 2024-01-04 RX ORDER — PROPOFOL 10 MG/ML
INJECTION, EMULSION INTRAVENOUS AS NEEDED
Status: DISCONTINUED | OUTPATIENT
Start: 2024-01-04 | End: 2024-01-04

## 2024-01-04 RX ORDER — PROTAMINE SULFATE 10 MG/ML
INJECTION, SOLUTION INTRAVENOUS AS NEEDED
Status: DISCONTINUED | OUTPATIENT
Start: 2024-01-04 | End: 2024-01-04

## 2024-01-04 RX ORDER — PAPAVERINE HYDROCHLORIDE 30 MG/ML
INJECTION INTRAMUSCULAR; INTRAVENOUS AS NEEDED
Status: DISCONTINUED | OUTPATIENT
Start: 2024-01-04 | End: 2024-01-04 | Stop reason: HOSPADM

## 2024-01-04 RX ORDER — CALCIUM CHLORIDE INJECTION 100 MG/ML
INJECTION, SOLUTION INTRAVENOUS AS NEEDED
Status: DISCONTINUED | OUTPATIENT
Start: 2024-01-04 | End: 2024-01-04

## 2024-01-04 RX ORDER — CEFAZOLIN 1 G/1
INJECTION, POWDER, FOR SOLUTION INTRAVENOUS AS NEEDED
Status: DISCONTINUED | OUTPATIENT
Start: 2024-01-04 | End: 2024-01-04

## 2024-01-04 RX ORDER — PANTOPRAZOLE SODIUM 40 MG/10ML
40 INJECTION, POWDER, LYOPHILIZED, FOR SOLUTION INTRAVENOUS
Status: DISCONTINUED | OUTPATIENT
Start: 2024-01-05 | End: 2024-01-05

## 2024-01-04 RX ORDER — NITROGLYCERIN 40 MG/100ML
INJECTION INTRAVENOUS AS NEEDED
Status: DISCONTINUED | OUTPATIENT
Start: 2024-01-04 | End: 2024-01-04

## 2024-01-04 RX ORDER — ALBUMIN HUMAN 50 G/1000ML
SOLUTION INTRAVENOUS AS NEEDED
Status: DISCONTINUED | OUTPATIENT
Start: 2024-01-04 | End: 2024-01-04

## 2024-01-04 RX ORDER — METHADONE HYDROCHLORIDE 10 MG/ML
20 INJECTION, SOLUTION INTRAMUSCULAR; INTRAVENOUS; SUBCUTANEOUS ONCE
Status: COMPLETED | OUTPATIENT
Start: 2024-01-04 | End: 2024-01-04

## 2024-01-04 RX ORDER — PHENYLEPHRINE HYDROCHLORIDE 10 MG/ML
INJECTION INTRAVENOUS AS NEEDED
Status: DISCONTINUED | OUTPATIENT
Start: 2024-01-04 | End: 2024-01-04

## 2024-01-04 RX ORDER — POTASSIUM CHLORIDE 29.8 MG/ML
40 INJECTION INTRAVENOUS EVERY 6 HOURS PRN
Status: DISCONTINUED | OUTPATIENT
Start: 2024-01-04 | End: 2024-01-05

## 2024-01-04 RX ORDER — NALOXONE HYDROCHLORIDE 0.4 MG/ML
0.2 INJECTION, SOLUTION INTRAMUSCULAR; INTRAVENOUS; SUBCUTANEOUS EVERY 5 MIN PRN
Status: DISCONTINUED | OUTPATIENT
Start: 2024-01-04 | End: 2024-01-05

## 2024-01-04 RX ORDER — HYDROMORPHONE HYDROCHLORIDE 1 MG/ML
0.2 INJECTION, SOLUTION INTRAMUSCULAR; INTRAVENOUS; SUBCUTANEOUS
Status: DISCONTINUED | OUTPATIENT
Start: 2024-01-04 | End: 2024-01-05

## 2024-01-04 RX ORDER — EPINEPHRINE HCL IN 0.9 % NACL 4MG/250ML
PLASTIC BAG, INJECTION (ML) INTRAVENOUS CONTINUOUS PRN
Status: DISCONTINUED | OUTPATIENT
Start: 2024-01-04 | End: 2024-01-04

## 2024-01-04 RX ORDER — CEFAZOLIN SODIUM 2 G/100ML
2 INJECTION, SOLUTION INTRAVENOUS EVERY 8 HOURS
Status: COMPLETED | OUTPATIENT
Start: 2024-01-04 | End: 2024-01-06

## 2024-01-04 RX ORDER — MAGNESIUM SULFATE HEPTAHYDRATE 40 MG/ML
2 INJECTION, SOLUTION INTRAVENOUS EVERY 6 HOURS PRN
Status: DISCONTINUED | OUTPATIENT
Start: 2024-01-04 | End: 2024-01-05

## 2024-01-04 RX ORDER — PANTOPRAZOLE SODIUM 40 MG/10ML
40 INJECTION, POWDER, LYOPHILIZED, FOR SOLUTION INTRAVENOUS
Status: DISCONTINUED | OUTPATIENT
Start: 2024-01-05 | End: 2024-01-04

## 2024-01-04 RX ORDER — NOREPINEPHRINE BITARTRATE 0.03 MG/ML
INJECTION, SOLUTION INTRAVENOUS CONTINUOUS PRN
Status: DISCONTINUED | OUTPATIENT
Start: 2024-01-04 | End: 2024-01-04

## 2024-01-04 RX ORDER — POTASSIUM CHLORIDE 1.5 G/1.58G
20 POWDER, FOR SOLUTION ORAL EVERY 6 HOURS PRN
Status: DISCONTINUED | OUTPATIENT
Start: 2024-01-04 | End: 2024-01-05

## 2024-01-04 RX ORDER — DEXMEDETOMIDINE HYDROCHLORIDE 4 UG/ML
0-1.5 INJECTION, SOLUTION INTRAVENOUS CONTINUOUS
Status: DISCONTINUED | OUTPATIENT
Start: 2024-01-04 | End: 2024-01-05

## 2024-01-04 RX ORDER — AMOXICILLIN 250 MG
2 CAPSULE ORAL 2 TIMES DAILY
Status: DISCONTINUED | OUTPATIENT
Start: 2024-01-04 | End: 2024-01-09 | Stop reason: HOSPADM

## 2024-01-04 RX ORDER — OXYCODONE HYDROCHLORIDE 5 MG/1
5 TABLET ORAL EVERY 4 HOURS PRN
Status: DISCONTINUED | OUTPATIENT
Start: 2024-01-04 | End: 2024-01-08

## 2024-01-04 RX ORDER — NAPROXEN SODIUM 220 MG/1
81 TABLET, FILM COATED ORAL DAILY
Status: DISCONTINUED | OUTPATIENT
Start: 2024-01-05 | End: 2024-01-09 | Stop reason: HOSPADM

## 2024-01-04 RX ORDER — ACETAMINOPHEN 325 MG/1
650 TABLET ORAL EVERY 4 HOURS
Status: DISCONTINUED | OUTPATIENT
Start: 2024-01-04 | End: 2024-01-05

## 2024-01-04 RX ORDER — NAPROXEN SODIUM 220 MG/1
81 TABLET, FILM COATED ORAL ONCE
Status: COMPLETED | OUTPATIENT
Start: 2024-01-04 | End: 2024-01-04

## 2024-01-04 RX ORDER — NOREPINEPHRINE BITARTRATE/D5W 8 MG/250ML
0-1 PLASTIC BAG, INJECTION (ML) INTRAVENOUS CONTINUOUS
Status: DISCONTINUED | OUTPATIENT
Start: 2024-01-04 | End: 2024-01-04

## 2024-01-04 RX ORDER — KETAMINE HYDROCHLORIDE 10 MG/ML
INJECTION, SOLUTION INTRAMUSCULAR; INTRAVENOUS AS NEEDED
Status: DISCONTINUED | OUTPATIENT
Start: 2024-01-04 | End: 2024-01-04

## 2024-01-04 RX ORDER — DEXTROSE 50 % IN WATER (D50W) INTRAVENOUS SYRINGE
AS NEEDED
Status: DISCONTINUED | OUTPATIENT
Start: 2024-01-04 | End: 2024-01-04

## 2024-01-04 RX ORDER — ONDANSETRON 4 MG/1
4 TABLET, FILM COATED ORAL EVERY 8 HOURS PRN
Status: DISCONTINUED | OUTPATIENT
Start: 2024-01-04 | End: 2024-01-05

## 2024-01-04 RX ORDER — CALCIUM GLUCONATE 20 MG/ML
2 INJECTION, SOLUTION INTRAVENOUS EVERY 6 HOURS PRN
Status: DISCONTINUED | OUTPATIENT
Start: 2024-01-04 | End: 2024-01-05

## 2024-01-04 RX ORDER — ALBUMIN HUMAN 50 G/1000ML
25 SOLUTION INTRAVENOUS ONCE
Status: COMPLETED | OUTPATIENT
Start: 2024-01-04 | End: 2024-01-04

## 2024-01-04 RX ORDER — POTASSIUM CHLORIDE 20 MEQ/1
20 TABLET, EXTENDED RELEASE ORAL EVERY 6 HOURS PRN
Status: DISCONTINUED | OUTPATIENT
Start: 2024-01-04 | End: 2024-01-05

## 2024-01-04 RX ORDER — ESMOLOL HYDROCHLORIDE 10 MG/ML
INJECTION INTRAVENOUS AS NEEDED
Status: DISCONTINUED | OUTPATIENT
Start: 2024-01-04 | End: 2024-01-04

## 2024-01-04 RX ORDER — NITROGLYCERIN 20 MG/100ML
10 INJECTION INTRAVENOUS CONTINUOUS
Status: DISCONTINUED | OUTPATIENT
Start: 2024-01-04 | End: 2024-01-04

## 2024-01-04 RX ORDER — CHLORHEXIDINE GLUCONATE ORAL RINSE 1.2 MG/ML
15 SOLUTION DENTAL 2 TIMES DAILY
Status: DISCONTINUED | OUTPATIENT
Start: 2024-01-04 | End: 2024-01-04

## 2024-01-04 RX ORDER — SODIUM CHLORIDE, SODIUM LACTATE, POTASSIUM CHLORIDE, CALCIUM CHLORIDE 600; 310; 30; 20 MG/100ML; MG/100ML; MG/100ML; MG/100ML
5 INJECTION, SOLUTION INTRAVENOUS CONTINUOUS
Status: DISCONTINUED | OUTPATIENT
Start: 2024-01-04 | End: 2024-01-05

## 2024-01-04 RX ORDER — GLYCOPYRROLATE 0.2 MG/ML
INJECTION INTRAMUSCULAR; INTRAVENOUS AS NEEDED
Status: DISCONTINUED | OUTPATIENT
Start: 2024-01-04 | End: 2024-01-04

## 2024-01-04 RX ORDER — NITROGLYCERIN 20 MG/100ML
5 INJECTION INTRAVENOUS CONTINUOUS
Status: DISCONTINUED | OUTPATIENT
Start: 2024-01-04 | End: 2024-01-04

## 2024-01-04 RX ORDER — DEXMEDETOMIDINE HYDROCHLORIDE 4 UG/ML
INJECTION, SOLUTION INTRAVENOUS CONTINUOUS PRN
Status: DISCONTINUED | OUTPATIENT
Start: 2024-01-04 | End: 2024-01-04

## 2024-01-04 RX ORDER — DEXTROSE 50 % IN WATER (D50W) INTRAVENOUS SYRINGE
25
Status: DISCONTINUED | OUTPATIENT
Start: 2024-01-04 | End: 2024-01-08

## 2024-01-04 RX ORDER — ACETAMINOPHEN 650 MG/1
650 SUPPOSITORY RECTAL EVERY 4 HOURS
Status: DISCONTINUED | OUTPATIENT
Start: 2024-01-04 | End: 2024-01-05

## 2024-01-04 RX ORDER — MUPIROCIN 20 MG/G
0.5 OINTMENT TOPICAL 2 TIMES DAILY
Status: DISCONTINUED | OUTPATIENT
Start: 2024-01-04 | End: 2024-01-04

## 2024-01-04 RX ORDER — PROPOFOL 10 MG/ML
INJECTION, EMULSION INTRAVENOUS CONTINUOUS PRN
Status: DISCONTINUED | OUTPATIENT
Start: 2024-01-04 | End: 2024-01-04

## 2024-01-04 RX ORDER — HYDROMORPHONE HYDROCHLORIDE 1 MG/ML
0.2 INJECTION, SOLUTION INTRAMUSCULAR; INTRAVENOUS; SUBCUTANEOUS
Status: DISCONTINUED | OUTPATIENT
Start: 2024-01-04 | End: 2024-01-04

## 2024-01-04 RX ORDER — CALCIUM GLUCONATE 20 MG/ML
1 INJECTION, SOLUTION INTRAVENOUS EVERY 6 HOURS PRN
Status: DISCONTINUED | OUTPATIENT
Start: 2024-01-04 | End: 2024-01-05

## 2024-01-04 RX ORDER — ASPIRIN 300 MG/1
150 SUPPOSITORY RECTAL DAILY
Status: DISCONTINUED | OUTPATIENT
Start: 2024-01-05 | End: 2024-01-05

## 2024-01-04 RX ORDER — PHENYLEPHRINE 10 MG/250 ML(40 MCG/ML)IN 0.9 % SOD.CHLORIDE INTRAVENOUS
CONTINUOUS PRN
Status: DISCONTINUED | OUTPATIENT
Start: 2024-01-04 | End: 2024-01-04

## 2024-01-04 RX ORDER — ONDANSETRON HYDROCHLORIDE 2 MG/ML
4 INJECTION, SOLUTION INTRAVENOUS EVERY 8 HOURS PRN
Status: DISCONTINUED | OUTPATIENT
Start: 2024-01-04 | End: 2024-01-05

## 2024-01-04 RX ORDER — ALBUMIN HUMAN 50 G/1000ML
SOLUTION INTRAVENOUS
Status: COMPLETED
Start: 2024-01-04 | End: 2024-01-04

## 2024-01-04 RX ORDER — MIDAZOLAM HYDROCHLORIDE 1 MG/ML
INJECTION, SOLUTION INTRAMUSCULAR; INTRAVENOUS AS NEEDED
Status: DISCONTINUED | OUTPATIENT
Start: 2024-01-04 | End: 2024-01-04

## 2024-01-04 RX ORDER — MAGNESIUM SULFATE HEPTAHYDRATE 500 MG/ML
INJECTION, SOLUTION INTRAMUSCULAR; INTRAVENOUS AS NEEDED
Status: DISCONTINUED | OUTPATIENT
Start: 2024-01-04 | End: 2024-01-04

## 2024-01-04 RX ORDER — ROCURONIUM BROMIDE 10 MG/ML
INJECTION, SOLUTION INTRAVENOUS AS NEEDED
Status: DISCONTINUED | OUTPATIENT
Start: 2024-01-04 | End: 2024-01-04

## 2024-01-04 RX ORDER — NAPROXEN SODIUM 220 MG/1
81 TABLET, FILM COATED ORAL DAILY
Status: DISCONTINUED | OUTPATIENT
Start: 2024-01-05 | End: 2024-01-05

## 2024-01-04 RX ORDER — PANTOPRAZOLE SODIUM 40 MG/1
40 TABLET, DELAYED RELEASE ORAL
Status: DISCONTINUED | OUTPATIENT
Start: 2024-01-05 | End: 2024-01-05

## 2024-01-04 RX ORDER — POLYETHYLENE GLYCOL 3350 17 G/17G
17 POWDER, FOR SOLUTION ORAL DAILY
Status: DISCONTINUED | OUTPATIENT
Start: 2024-01-04 | End: 2024-01-05

## 2024-01-04 RX ORDER — FENTANYL CITRATE 50 UG/ML
INJECTION, SOLUTION INTRAMUSCULAR; INTRAVENOUS AS NEEDED
Status: DISCONTINUED | OUTPATIENT
Start: 2024-01-04 | End: 2024-01-04

## 2024-01-04 RX ORDER — ASPIRIN 300 MG/1
150 SUPPOSITORY RECTAL ONCE
Status: COMPLETED | OUTPATIENT
Start: 2024-01-04 | End: 2024-01-04

## 2024-01-04 RX ADMIN — Medication 50 MG: at 07:44

## 2024-01-04 RX ADMIN — ASPIRIN 81 MG CHEWABLE TABLET 81 MG: 81 TABLET CHEWABLE at 18:06

## 2024-01-04 RX ADMIN — FENTANYL CITRATE 100 MCG: 50 INJECTION, SOLUTION INTRAMUSCULAR; INTRAVENOUS at 09:31

## 2024-01-04 RX ADMIN — ACETAMINOPHEN 650 MG: 325 TABLET ORAL at 18:08

## 2024-01-04 RX ADMIN — Medication 4 MCG: at 11:17

## 2024-01-04 RX ADMIN — CEFAZOLIN 2 G: 1 INJECTION, POWDER, FOR SOLUTION INTRAMUSCULAR; INTRAVENOUS at 11:57

## 2024-01-04 RX ADMIN — INSULIN LISPRO 5 UNITS: 100 INJECTION, SOLUTION INTRAVENOUS; SUBCUTANEOUS at 17:12

## 2024-01-04 RX ADMIN — PROTAMINE SULFATE 350 MG: 10 INJECTION, SOLUTION INTRAVENOUS at 11:11

## 2024-01-04 RX ADMIN — NITROGLYCERIN 5 MCG/MIN: 20 INJECTION INTRAVENOUS at 14:18

## 2024-01-04 RX ADMIN — SODIUM CHLORIDE, SODIUM GLUCONATE, SODIUM ACETATE, POTASSIUM CHLORIDE AND MAGNESIUM CHLORIDE: 526; 502; 368; 37; 30 INJECTION, SOLUTION INTRAVENOUS at 07:59

## 2024-01-04 RX ADMIN — PHENYLEPHRINE HYDROCHLORIDE 80 MCG: 10 INJECTION INTRAVENOUS at 09:05

## 2024-01-04 RX ADMIN — Medication 8 MCG: at 11:59

## 2024-01-04 RX ADMIN — SODIUM CHLORIDE, POTASSIUM CHLORIDE, SODIUM LACTATE AND CALCIUM CHLORIDE 500 ML: 600; 310; 30; 20 INJECTION, SOLUTION INTRAVENOUS at 20:58

## 2024-01-04 RX ADMIN — HEPARIN SODIUM 37000 UNITS: 1000 INJECTION, SOLUTION INTRAVENOUS; SUBCUTANEOUS at 09:24

## 2024-01-04 RX ADMIN — PHENYLEPHRINE HYDROCHLORIDE 40 MCG: 10 INJECTION INTRAVENOUS at 09:15

## 2024-01-04 RX ADMIN — SODIUM CHLORIDE, POTASSIUM CHLORIDE, SODIUM LACTATE AND CALCIUM CHLORIDE 500 ML: 600; 310; 30; 20 INJECTION, SOLUTION INTRAVENOUS at 15:44

## 2024-01-04 RX ADMIN — Medication 8 MCG: at 11:36

## 2024-01-04 RX ADMIN — PHENYLEPHRINE HYDROCHLORIDE 80 MCG: 10 INJECTION INTRAVENOUS at 09:20

## 2024-01-04 RX ADMIN — Medication 100 MCG: at 08:37

## 2024-01-04 RX ADMIN — DEXMEDETOMIDINE HYDROCHLORIDE 0.2 MCG/KG/HR: 400 INJECTION INTRAVENOUS at 17:10

## 2024-01-04 RX ADMIN — Medication 16 MCG: at 11:31

## 2024-01-04 RX ADMIN — PROPOFOL 100 MG: 10 INJECTION, EMULSION INTRAVENOUS at 07:41

## 2024-01-04 RX ADMIN — Medication 0.02 MCG/KG/MIN: at 10:55

## 2024-01-04 RX ADMIN — ESMOLOL HYDROCHLORIDE 10 MG: 100 INJECTION, SOLUTION INTRAVENOUS at 07:30

## 2024-01-04 RX ADMIN — INSULIN HUMAN 5 UNITS: 100 INJECTION, SOLUTION PARENTERAL at 10:27

## 2024-01-04 RX ADMIN — Medication 8 MCG: at 11:19

## 2024-01-04 RX ADMIN — PHENYLEPHRINE HYDROCHLORIDE 120 MCG: 10 INJECTION INTRAVENOUS at 09:06

## 2024-01-04 RX ADMIN — DEXTROSE MONOHYDRATE 6.25 G: 25 INJECTION, SOLUTION INTRAVENOUS at 10:27

## 2024-01-04 RX ADMIN — ROCURONIUM BROMIDE 50 MG: 10 INJECTION INTRAVENOUS at 09:24

## 2024-01-04 RX ADMIN — ALBUMIN (HUMAN) 250 ML: 12.5 SOLUTION INTRAVENOUS at 11:13

## 2024-01-04 RX ADMIN — Medication 4 MCG: at 11:22

## 2024-01-04 RX ADMIN — CALCIUM CHLORIDE 1 G: 100 INJECTION, SOLUTION INTRAVENOUS at 11:02

## 2024-01-04 RX ADMIN — MIDAZOLAM 2 MG: 1 INJECTION INTRAMUSCULAR; INTRAVENOUS at 07:25

## 2024-01-04 RX ADMIN — SODIUM CHLORIDE, SODIUM LACTATE, POTASSIUM CHLORIDE, AND CALCIUM CHLORIDE: 600; 310; 30; 20 INJECTION, SOLUTION INTRAVENOUS at 07:30

## 2024-01-04 RX ADMIN — SODIUM CHLORIDE, SODIUM GLUCONATE, SODIUM ACETATE, POTASSIUM CHLORIDE AND MAGNESIUM CHLORIDE: 30; 37; 368; 526; 502 INJECTION, SOLUTION INTRAVENOUS at 07:59

## 2024-01-04 RX ADMIN — ALBUMIN HUMAN 25 G: 50 SOLUTION INTRAVENOUS at 15:33

## 2024-01-04 RX ADMIN — CEFAZOLIN SODIUM 2 G: 2 INJECTION, SOLUTION INTRAVENOUS at 20:25

## 2024-01-04 RX ADMIN — Medication 8 MCG: at 11:28

## 2024-01-04 RX ADMIN — DEXTROSE MONOHYDRATE 6.25 G: 25 INJECTION, SOLUTION INTRAVENOUS at 10:53

## 2024-01-04 RX ADMIN — ROCURONIUM BROMIDE 100 MG: 10 INJECTION INTRAVENOUS at 07:44

## 2024-01-04 RX ADMIN — SODIUM CHLORIDE, POTASSIUM CHLORIDE, SODIUM LACTATE AND CALCIUM CHLORIDE 30 ML/HR: 600; 310; 30; 20 INJECTION, SOLUTION INTRAVENOUS at 14:20

## 2024-01-04 RX ADMIN — ROCURONIUM BROMIDE 30 MG: 10 INJECTION INTRAVENOUS at 11:07

## 2024-01-04 RX ADMIN — DEXTROSE MONOHYDRATE 6.25 G: 25 INJECTION, SOLUTION INTRAVENOUS at 10:09

## 2024-01-04 RX ADMIN — CEFAZOLIN 2 G: 1 INJECTION, POWDER, FOR SOLUTION INTRAMUSCULAR; INTRAVENOUS at 08:09

## 2024-01-04 RX ADMIN — MAGNESIUM SULFATE HEPTAHYDRATE 2 G: 500 INJECTION, SOLUTION INTRAMUSCULAR; INTRAVENOUS at 11:02

## 2024-01-04 RX ADMIN — FENTANYL CITRATE 100 MCG: 50 INJECTION, SOLUTION INTRAMUSCULAR; INTRAVENOUS at 08:09

## 2024-01-04 RX ADMIN — Medication 100 MCG: at 08:28

## 2024-01-04 RX ADMIN — KETAMINE HYDROCHLORIDE 0.5 MG/KG/HR: 100 INJECTION, SOLUTION, CONCENTRATE INTRAMUSCULAR; INTRAVENOUS at 09:05

## 2024-01-04 RX ADMIN — DEXMEDETOMIDINE HYDROCHLORIDE 0.5 MCG/KG/HR: 400 INJECTION INTRAVENOUS at 07:59

## 2024-01-04 RX ADMIN — DEXTROSE MONOHYDRATE 3.12 G: 25 INJECTION, SOLUTION INTRAVENOUS at 11:31

## 2024-01-04 RX ADMIN — SODIUM CHLORIDE, POTASSIUM CHLORIDE, SODIUM LACTATE AND CALCIUM CHLORIDE 5 ML/HR: 600; 310; 30; 20 INJECTION, SOLUTION INTRAVENOUS at 14:20

## 2024-01-04 RX ADMIN — SODIUM CHLORIDE, POTASSIUM CHLORIDE, SODIUM LACTATE AND CALCIUM CHLORIDE 500 ML: 600; 310; 30; 20 INJECTION, SOLUTION INTRAVENOUS at 14:18

## 2024-01-04 RX ADMIN — GLYCOPYRROLATE 0.2 MG: 0.2 INJECTION INTRAMUSCULAR; INTRAVENOUS at 08:50

## 2024-01-04 RX ADMIN — PROPOFOL 25 MCG/KG/MIN: 10 INJECTION, EMULSION INTRAVENOUS at 11:38

## 2024-01-04 RX ADMIN — Medication 8 MCG: at 11:44

## 2024-01-04 RX ADMIN — METHADONE HYDROCHLORIDE 20 MG: 10 INJECTION, SOLUTION INTRAMUSCULAR; INTRAVENOUS; SUBCUTANEOUS at 08:07

## 2024-01-04 RX ADMIN — KETAMINE HYDROCHLORIDE 50 MG: 10 INJECTION, SOLUTION INTRAMUSCULAR; INTRAVENOUS at 07:44

## 2024-01-04 RX ADMIN — INSULIN HUMAN 5 UNITS: 100 INJECTION, SOLUTION PARENTERAL at 10:10

## 2024-01-04 RX ADMIN — NOREPINEPHRINE BITARTRATE 0.04 MCG/KG/MIN: 8 INJECTION, SOLUTION INTRAVENOUS at 14:19

## 2024-01-04 RX ADMIN — Medication 16 MCG: at 11:34

## 2024-01-04 RX ADMIN — Medication 8 MCG: at 11:35

## 2024-01-04 RX ADMIN — SUGAMMADEX 200 MG: 100 INJECTION, SOLUTION INTRAVENOUS at 13:24

## 2024-01-04 RX ADMIN — FENTANYL CITRATE 100 MCG: 50 INJECTION, SOLUTION INTRAMUSCULAR; INTRAVENOUS at 07:43

## 2024-01-04 RX ADMIN — INSULIN HUMAN 3 UNITS: 100 INJECTION, SOLUTION PARENTERAL at 10:53

## 2024-01-04 RX ADMIN — Medication 0.02 MCG/KG/MIN: at 09:07

## 2024-01-04 RX ADMIN — ALBUMIN HUMAN 25 G: 0.05 INJECTION, SOLUTION INTRAVENOUS at 15:33

## 2024-01-04 RX ADMIN — SODIUM CHLORIDE, POTASSIUM CHLORIDE, SODIUM LACTATE AND CALCIUM CHLORIDE 500 ML: 600; 310; 30; 20 INJECTION, SOLUTION INTRAVENOUS at 17:23

## 2024-01-04 RX ADMIN — NITROGLYCERIN 10 MCG/MIN: 20 INJECTION INTRAVENOUS at 08:47

## 2024-01-04 RX ADMIN — ROCURONIUM BROMIDE 10 MG: 10 INJECTION INTRAVENOUS at 12:53

## 2024-01-04 ASSESSMENT — PAIN SCALES - GENERAL
PAINLEVEL_OUTOF10: 0 - NO PAIN
PAIN_LEVEL: 0

## 2024-01-04 ASSESSMENT — COLUMBIA-SUICIDE SEVERITY RATING SCALE - C-SSRS
6. HAVE YOU EVER DONE ANYTHING, STARTED TO DO ANYTHING, OR PREPARED TO DO ANYTHING TO END YOUR LIFE?: NO
1. IN THE PAST MONTH, HAVE YOU WISHED YOU WERE DEAD OR WISHED YOU COULD GO TO SLEEP AND NOT WAKE UP?: NO
2. HAVE YOU ACTUALLY HAD ANY THOUGHTS OF KILLING YOURSELF?: NO

## 2024-01-04 ASSESSMENT — PAIN - FUNCTIONAL ASSESSMENT
PAIN_FUNCTIONAL_ASSESSMENT: CPOT (CRITICAL CARE PAIN OBSERVATION TOOL)
PAIN_FUNCTIONAL_ASSESSMENT: 0-10

## 2024-01-04 NOTE — ANESTHESIA PREPROCEDURE EVALUATION
"Patient: Jones Howell \"Samir\"    Procedure Information       Anesthesia Start Date/Time: 01/04/24 0714    Procedure: Median Sternotomy, Coronary Artery BYpass Grafting x 2 with Left Internal Thoracic Artery and Radial    Location: [facility] OR 21 / [facility]    Surgeons: Juan A Jimenez MD            Relevant Problems   Cardiovascular   (+) 2-vessel coronary artery disease   (+) Atypical chest pain   (+) Coronary artery disease of native artery of native heart with stable angina pectoris (CMS/HCC)   (+) Coronary artery disease with angina pectoris (CMS/HCC)      GI   (+) GERD (gastroesophageal reflux disease)      Neuro/Psych   (+) Sciatica       Clinical information reviewed:   Tobacco  Allergies    Med Hx  Surg Hx   Fam Hx  Soc Hx        NPO Detail:  NPO/Void Status  Date of Last Liquid: 01/04/24  Time of Last Liquid: 0400  Date of Last Solid: 01/03/24  Time of Last Solid: 1800         Physical Exam    Airway  Mallampati: III  TM distance: >3 FB  Neck ROM: full     Cardiovascular   Rhythm: regular     Dental - normal exam     Pulmonary   Breath sounds clear to auscultation     Abdominal   Abdomen: soft             Anesthesia Plan    ASA 4     general   (Bilateral chest wall blocks for postop pain control consented.)  intravenous induction   Anesthetic plan and risks discussed with patient.  Use of blood products discussed with patient who.    Plan discussed with CRNA.      "

## 2024-01-04 NOTE — BRIEF OP NOTE
"Date: 2024  OR Location: [facility] OR    Name: Jones Howell \"Samir\", : 1955, Age: 68 y.o., MRN: [id number], Sex: male    Diagnosis  Pre-op Diagnosis     * Coronary artery disease of native artery of native heart with stable angina pectoris (CMS/HCC) [I25.118] Post-op Diagnosis     * Coronary artery disease of native artery of native heart with stable angina pectoris (CMS/HCC) [I25.118]     Procedures  Median Sternotomy, Coronary Artery BYpass Grafting x 2 with Left Internal Thoracic Artery and Radial, posterior pericardiotomy  31768 - ND CABG W/ARTERIAL GRAFT TWO ARTERIAL GRAFTS    Median sternotomy  Central cannulation  Endoscopic harvest of left radial artery  CABG x 2 (LIMA->LAD, RA->PDA)  Posterior pericardial window      Chest Tubes/Drains: CT x 3 (mediastinal x 2, 1 left pleural)       Temporary Pacing Wires: V pacing wires      Permanent pacer/ICD: No     Sternotomy performed by: Dr Jimenez    Conduit Harvested by: Mark Anthony CERVANTES     Sternal Wires placed by: Mark Anthony CERVANTES     Arm/Leg/Groin Closure/Cutdown performed by: left arm closed by Mark Anthony CERVANTES     Cardio Pulmonary Bypass Time: 84min  Cross-clamp Time: 70min  Circulatory Arrest: No Time:     Is patient candidate for Emergency Re-sternotomy? Yes   -If yes, POD #10 is - 24      Surgeons      * Juan A Jimenez - Primary    Resident/Fellow/Other Assistant:  Surgeon(s) and Role:    Procedure Summary  Anesthesia: General  ASA: ASA status not filed in the log.  Anesthesia Staff: Anesthesiologist: Jaci Rubio MD  C-AA: EDMAR Billings  Perfusionist: Dominguez Iniguez  Estimated Blood Loss: 250mL  Intra-op Medications:   Medication Name Total Dose   papaverine injection 480 mg   vancomycin (Vancocin) vial for injection 4 g   gelatin absorbable (Gelfoam) 100 sponge 1 each   heparin 1,000 unit/mL injection 47,000 Units   microfibril. collagen hemostat (Ultrafoam) sponge sponge 1 each   ketamine (Ketalar) 500 mg in dextrose 5 % in water " (D5W) 250 mL (2 mg/mL) infusion 97.26 mg   methadone (Dolophine) injection 20 mg 20 mg              Anesthesia Record               Intraprocedure I/O Totals          Intake    Dexmedetomidine 0.00 mL    The total shown is the total volume documented since Anesthesia Start was filed.    Ketamine 0.00 mL    The total shown is the total volume documented since Anesthesia Start was filed.    Norepinephrine Drip 0.00 mL    The total shown is the total volume documented since Anesthesia Start was filed.    Nitroglycerin Drip 0.00 mL    The total shown is the total volume documented since Anesthesia Start was filed.    Epinephrine Drip 0.00 mL    The total shown is the total volume documented since Anesthesia Start was filed.    Propofol Drip 0.00 mL    The total shown is the total volume documented since Anesthesia Start was filed.    Cell Saver 480 mL    Total Intake 480 mL       Output    Urine 320 mL    Total Output 320 mL       Net    Net Volume 160 mL          Specimen: No specimens collected     Staff:   Circulator: Melissa Whitaker RN  Relief Circulator: Iftikhar Garces RN  Scrub Person: Tiffani Serna RN          Findings: *    Complications:  None; patient tolerated the procedure well.     Disposition: ICU - intubated and hemodynamically stable.  Condition: stable  Specimens Collected: No specimens collected  Attending Attestation: I was present for key portions of the operation  Juan A Jimenez  Phone Number: [phone]

## 2024-01-04 NOTE — CONSULTS
Consults  Acute Pain Service    Jones Howell is a 68 y.o. year old male patient who presents for CABGx2 with Dr. Camilo. Acute Pain consulted for block for postoperative pain control.     Anticipated Postop Pain Issues -   Palliative: typically relieved with IV analgesics and regional local anesthetics  Provocative: typically with movement  Quality: typically burning and aching  Radiation: typically none  Severity: typically severe 8-10/10  Timing: typically constant    Past Medical History:   Diagnosis Date    Cataract     Celiac disease     Colitis     Coronary artery disease 12/04/2023    Severe 2 vessel CAD in a right dominant system.  Elevated LVEDP.  No evidence of aortic stenosis.    Hyperlipidemia     Lymphocytic colitis         Past Surgical History:   Procedure Laterality Date    CARDIAC CATHETERIZATION N/A 12/04/2023    Procedure: Left Heart Cath, With LV;  Surgeon: Javier Ma MD;  Location: [facility] [place] [facility];  Service: Cardiovascular;  Laterality: N/A;    LUMBAR SPINE SURGERY          Family History   Problem Relation Name Age of Onset    Alzheimer's disease Mother  80    Other (COVID) Father  90        Social History     Socioeconomic History    Marital status:      Spouse name: Not on file    Number of children: Not on file    Years of education: Not on file    Highest education level: Not on file   Occupational History    Not on file   Tobacco Use    Smoking status: Never    Smokeless tobacco: Never   Substance and Sexual Activity    Alcohol use: Yes     Comment: SOCIALLY    Drug use: Never    Sexual activity: Defer   Other Topics Concern    Not on file   Social History Narrative    Not on file     Social Determinants of Health     Financial Resource Strain: Not on file   Food Insecurity: Not on file   Transportation Needs: Not on file   Physical Activity: Not on file   Stress: Not on file   Social Connections: Not on file   Intimate Partner Violence: Not on file   Housing Stability:  Not on file        Allergies   Allergen Reactions    Fentanyl Other     Diaphoresis and bradycardia    Lactose Diarrhea         Review of Systems  Gen: No fatigue, anorexia, insomnia, fever.   Eyes: No vision loss, double vision, drainage, eye pain.   ENT: No pharyngitis, dry mouth, no hearing changes or ear discharge  Cardiac: No chest pain, palpitations, syncope, near syncope.   Pulmonary: No shortness of breath, cough, hemoptysis.   Heme/lymph: No swollen glands, fever, bleeding.   GI: No abdominal pain, change in bowel habits, melena, hematemesis, hematochezia, nausea, vomiting, diarrhea.   : No discharge, dysuria, frequency, urgency, hematuria.  Endo: No polyuria or weight loss.   Musculoskeletal: Negative for any pain or loss of ROM/weakness  Skin: No rashes or lesions  Neuro: Normal speech, no numbness or weakness. No gait difficulties  Review of systems is otherwise negative unless stated above or in history of present illness.    Physical Exam:  Constitutional:  no distress, alert and cooperative  Eyes: clear sclera  Head/Neck: No apparent injury, trachea midline  Respiratory/Thorax: Patent airways, thorax symmetric, breathing comfortably  Cardiovascular: no pitting edema  Gastrointestinal: Nondistended  Musculoskeletal: ROM intact  Extremities: no clubbing  Neurological: alert, stokes x4  Psychological: Appropriate affect    Results for orders placed or performed during the hospital encounter of 01/04/24 (from the past 24 hour(s))   Verify ABO/Rh Group Test (VERAB)   Result Value Ref Range    ABO TYPE A     Rh TYPE POS    Prepare RBC: 4 Units   Result Value Ref Range    PRODUCT CODE H9041B45     Unit Number [id number]     Unit ABO A     Unit RH POS     XM INTEP COMP     Dispense Status XM     Blood Expiration Date January 29, 2024 23:59 EST     PRODUCT BLOOD TYPE 6200     UNIT VOLUME 350     PRODUCT CODE U6024L34     Unit Number [id number]     Unit ABO A     Unit RH POS     XM INTEP COMP      Dispense Status XM     Blood Expiration Date January 29, 2024 23:59 EST     PRODUCT BLOOD TYPE 6200     UNIT VOLUME 350     PRODUCT CODE S6467B52     Unit Number [id number]     Unit ABO A     Unit RH POS     XM INTEP COMP     Dispense Status XM     Blood Expiration Date February 03, 2024 23:59 EST     PRODUCT BLOOD TYPE 6200     UNIT VOLUME 350     PRODUCT CODE T5995B42     Unit Number [id number]     Unit ABO A     Unit RH POS     XM INTEP COMP     Dispense Status XM     Blood Expiration Date January 29, 2024 23:59 EST     PRODUCT BLOOD TYPE 6200     UNIT VOLUME 350    Blood Gas Arterial Full Panel Unsolicited   Result Value Ref Range    POCT pH, Arterial 7.47 (H) 7.38 - 7.42 pH    POCT pCO2, Arterial 36 (L) 38 - 42 mm Hg    POCT pO2, Arterial 77 (L) 85 - 95 mm Hg    POCT SO2, Arterial 97 94 - 100 %    POCT Oxy Hemoglobin, Arterial 94.6 94.0 - 98.0 %    POCT Hematocrit Calculated, Arterial 38.0 (L) 41.0 - 52.0 %    POCT Sodium, Arterial 138 136 - 145 mmol/L    POCT Potassium, Arterial 3.8 3.5 - 5.3 mmol/L    POCT Chloride, Arterial 105 98 - 107 mmol/L    POCT Ionized Calcium, Arterial 1.19 1.10 - 1.33 mmol/L    POCT Glucose, Arterial 94 74 - 99 mg/dL    POCT Lactate, Arterial 0.8 0.4 - 2.0 mmol/L    POCT Base Excess, Arterial 2.6 -2.0 - 3.0 mmol/L    POCT HCO3 Calculated, Arterial 26.2 (H) 22.0 - 26.0 mmol/L    POCT Hemoglobin, Arterial 12.5 (L) 13.5 - 17.5 g/dL    POCT Anion Gap, Arterial 11 10 - 25 mmo/L    Patient Temperature 37.0 degrees Celsius    FiO2 21 %   Coox Panel, Arterial Unsolicited   Result Value Ref Range    POCT Hemoglobin, Arterial 12.5 (L) 13.5 - 17.5 g/dL    POCT Oxy Hemoglobin, Arterial 94.6 94.0 - 98.0 %    POCT Carboxyhemoglobin, Arterial 1.0 %    POCT Methemoglobin, Arterial 0.9 0.0 - 1.5 %    POCT Deoxy Hemoglobin, Arterial 3.4 0.0 - 5.0 %   Blood Gas Arterial Full Panel Unsolicited   Result Value Ref Range    POCT pH, Arterial 7.40 7.38 - 7.42 pH    POCT pCO2, Arterial 43 (H) 38 -  42 mm Hg    POCT pO2, Arterial 288 (H) 85 - 95 mm Hg    POCT SO2, Arterial 99 94 - 100 %    POCT Oxy Hemoglobin, Arterial 97.8 94.0 - 98.0 %    POCT Hematocrit Calculated, Arterial 35.0 (L) 41.0 - 52.0 %    POCT Sodium, Arterial 137 136 - 145 mmol/L    POCT Potassium, Arterial 4.3 3.5 - 5.3 mmol/L    POCT Chloride, Arterial 105 98 - 107 mmol/L    POCT Ionized Calcium, Arterial 1.18 1.10 - 1.33 mmol/L    POCT Glucose, Arterial 118 (H) 74 - 99 mg/dL    POCT Lactate, Arterial 0.7 0.4 - 2.0 mmol/L    POCT Base Excess, Arterial 1.5 -2.0 - 3.0 mmol/L    POCT HCO3 Calculated, Arterial 26.6 (H) 22.0 - 26.0 mmol/L    POCT Hemoglobin, Arterial 11.5 (L) 13.5 - 17.5 g/dL    POCT Anion Gap, Arterial 10 10 - 25 mmo/L    Patient Temperature 37.0 degrees Celsius    FiO2 60 %   Coox Panel, Arterial Unsolicited   Result Value Ref Range    POCT Hemoglobin, Arterial 11.5 (L) 13.5 - 17.5 g/dL    POCT Oxy Hemoglobin, Arterial 97.8 94.0 - 98.0 %    POCT Carboxyhemoglobin, Arterial 0.7 %    POCT Methemoglobin, Arterial 0.9 0.0 - 1.5 %    POCT Deoxy Hemoglobin, Arterial 0.6 0.0 - 5.0 %   Blood Gas Arterial Full Panel Unsolicited   Result Value Ref Range    POCT pH, Arterial 7.38 7.38 - 7.42 pH    POCT pCO2, Arterial 42 38 - 42 mm Hg    POCT pO2, Arterial 342 (H) 85 - 95 mm Hg    POCT SO2, Arterial 100 94 - 100 %    POCT Oxy Hemoglobin, Arterial 99.0 (H) 94.0 - 98.0 %    POCT Hematocrit Calculated, Arterial 29.0 (L) 41.0 - 52.0 %    POCT Sodium, Arterial 137 136 - 145 mmol/L    POCT Potassium, Arterial 6.9 (HH) 3.5 - 5.3 mmol/L    POCT Chloride, Arterial 110 (H) 98 - 107 mmol/L    POCT Ionized Calcium, Arterial 1.14 1.10 - 1.33 mmol/L    POCT Glucose, Arterial 125 (H) 74 - 99 mg/dL    POCT Lactate, Arterial 0.8 0.4 - 2.0 mmol/L    POCT Base Excess, Arterial -0.4 -2.0 - 3.0 mmol/L    POCT HCO3 Calculated, Arterial 24.8 22.0 - 26.0 mmol/L    POCT Hemoglobin, Arterial 9.8 (L) 13.5 - 17.5 g/dL    POCT Anion Gap, Arterial 9 (L) 10 - 25 mmo/L     Patient Temperature 37.0 degrees Celsius    FiO2 80 %   Coox Panel, Arterial Unsolicited   Result Value Ref Range    POCT Hemoglobin, Arterial 9.8 (L) 13.5 - 17.5 g/dL    POCT Oxy Hemoglobin, Arterial 99.0 (H) 94.0 - 98.0 %    POCT Carboxyhemoglobin, Arterial 0.7 %    POCT Methemoglobin, Arterial 0.0 0.0 - 1.5 %    POCT Deoxy Hemoglobin, Arterial 0.2 0.0 - 5.0 %   Blood Gas Venous Full Panel Unsolicited   Result Value Ref Range    POCT pH, Venous 7.33 7.33 - 7.43 pH    POCT pCO2, Venous 48 41 - 51 mm Hg    POCT pO2, Venous 49 (H) 35 - 45 mm Hg    POCT SO2, Venous 81 (H) 45 - 75 %    POCT Oxy Hemoglobin, Venous 79.5 (H) 45.0 - 75.0 %    POCT Hematocrit Calculated, Venous 29.0 (L) 41.0 - 52.0 %    POCT Sodium, Venous 138 136 - 145 mmol/L    POCT Potassium, Venous 6.8 (HH) 3.5 - 5.3 mmol/L    POCT Chloride, Venous 108 (H) 98 - 107 mmol/L    POCT Ionized Calicum, Venous 1.17 1.10 - 1.33 mmol/L    POCT Glucose, Venous 122 (H) 74 - 99 mg/dL    POCT Lactate, Venous 0.8 0.4 - 2.0 mmol/L    POCT Base Excess, Venous -0.9 -2.0 - 3.0 mmol/L    POCT HCO3 Calculated, Venous 25.3 22.0 - 26.0 mmol/L    POCT Hemoglobin, Venous 9.7 (L) 13.5 - 17.5 g/dL    POCT Anion Gap, Venous 12.0 10.0 - 25.0 mmol/L    Patient Temperature 37.0 degrees Celsius    FiO2 80 %   Coox Panel, Venous Unsolicited   Result Value Ref Range    POCT Carboxyhemoglobin, Venous 1.3 %    POCT Methemoglobin, Venous 0.2 0.0 - 1.5 %   Blood Gas Arterial Full Panel Unsolicited   Result Value Ref Range    POCT pH, Arterial 7.37 (L) 7.38 - 7.42 pH    POCT pCO2, Arterial 43 (H) 38 - 42 mm Hg    POCT pO2, Arterial 314 (H) 85 - 95 mm Hg    POCT SO2, Arterial 100 94 - 100 %    POCT Oxy Hemoglobin, Arterial 98.4 (H) 94.0 - 98.0 %    POCT Hematocrit Calculated, Arterial 29.0 (L) 41.0 - 52.0 %    POCT Sodium, Arterial 140 136 - 145 mmol/L    POCT Potassium, Arterial 6.0 (H) 3.5 - 5.3 mmol/L    POCT Chloride, Arterial 111 (H) 98 - 107 mmol/L    POCT Ionized Calcium, Arterial  1.08 (L) 1.10 - 1.33 mmol/L    POCT Glucose, Arterial 120 (H) 74 - 99 mg/dL    POCT Lactate, Arterial 0.8 0.4 - 2.0 mmol/L    POCT Base Excess, Arterial -0.5 -2.0 - 3.0 mmol/L    POCT HCO3 Calculated, Arterial 24.9 22.0 - 26.0 mmol/L    POCT Hemoglobin, Arterial 9.8 (L) 13.5 - 17.5 g/dL    POCT Anion Gap, Arterial 10 10 - 25 mmo/L    Patient Temperature 37.0 degrees Celsius    FiO2 80 %   Coox Panel, Arterial Unsolicited   Result Value Ref Range    POCT Hemoglobin, Arterial 9.8 (L) 13.5 - 17.5 g/dL    POCT Oxy Hemoglobin, Arterial 98.4 (H) 94.0 - 98.0 %    POCT Carboxyhemoglobin, Arterial 0.8 %    POCT Methemoglobin, Arterial 0.5 0.0 - 1.5 %    POCT Deoxy Hemoglobin, Arterial 0.4 0.0 - 5.0 %   Blood Gas Arterial Full Panel Unsolicited   Result Value Ref Range    POCT pH, Arterial 7.31 (L) 7.38 - 7.42 pH    POCT pCO2, Arterial 44 (H) 38 - 42 mm Hg    POCT pO2, Arterial 303 (H) 85 - 95 mm Hg    POCT SO2, Arterial 100 94 - 100 %    POCT Oxy Hemoglobin, Arterial 98.6 (H) 94.0 - 98.0 %    POCT Hematocrit Calculated, Arterial 28.0 (L) 41.0 - 52.0 %    POCT Sodium, Arterial 140 136 - 145 mmol/L    POCT Potassium, Arterial 5.4 (H) 3.5 - 5.3 mmol/L    POCT Chloride, Arterial 112 (H) 98 - 107 mmol/L    POCT Ionized Calcium, Arterial 1.07 (L) 1.10 - 1.33 mmol/L    POCT Glucose, Arterial 127 (H) 74 - 99 mg/dL    POCT Lactate, Arterial 0.9 0.4 - 2.0 mmol/L    POCT Base Excess, Arterial -3.9 (L) -2.0 - 3.0 mmol/L    POCT HCO3 Calculated, Arterial 22.2 22.0 - 26.0 mmol/L    POCT Hemoglobin, Arterial 9.3 (L) 13.5 - 17.5 g/dL    POCT Anion Gap, Arterial 11 10 - 25 mmo/L    Patient Temperature 37.0 degrees Celsius    FiO2 80 %   Coox Panel, Arterial Unsolicited   Result Value Ref Range    POCT Hemoglobin, Arterial 9.3 (L) 13.5 - 17.5 g/dL    POCT Oxy Hemoglobin, Arterial 98.6 (H) 94.0 - 98.0 %    POCT Carboxyhemoglobin, Arterial 0.8 %    POCT Methemoglobin, Arterial 0.4 0.0 - 1.5 %    POCT Deoxy Hemoglobin, Arterial 0.2 0.0 - 5.0 %    Blood Gas Arterial Full Panel Unsolicited   Result Value Ref Range    POCT pH, Arterial 7.35 (L) 7.38 - 7.42 pH    POCT pCO2, Arterial 43 (H) 38 - 42 mm Hg    POCT pO2, Arterial 278 (H) 85 - 95 mm Hg    POCT SO2, Arterial 99 94 - 100 %    POCT Oxy Hemoglobin, Arterial 97.9 94.0 - 98.0 %    POCT Hematocrit Calculated, Arterial 30.0 (L) 41.0 - 52.0 %    POCT Sodium, Arterial 141 136 - 145 mmol/L    POCT Potassium, Arterial 5.6 (H) 3.5 - 5.3 mmol/L    POCT Chloride, Arterial 112 (H) 98 - 107 mmol/L    POCT Ionized Calcium, Arterial 1.06 (L) 1.10 - 1.33 mmol/L    POCT Glucose, Arterial 86 74 - 99 mg/dL    POCT Lactate, Arterial 1.0 0.4 - 2.0 mmol/L    POCT Base Excess, Arterial -1.9 -2.0 - 3.0 mmol/L    POCT HCO3 Calculated, Arterial 23.7 22.0 - 26.0 mmol/L    POCT Hemoglobin, Arterial 10.0 (L) 13.5 - 17.5 g/dL    POCT Anion Gap, Arterial 11 10 - 25 mmo/L    Patient Temperature 37.0 degrees Celsius    FiO2 80 %   Coox Panel, Arterial Unsolicited   Result Value Ref Range    POCT Hemoglobin, Arterial 10.0 (L) 13.5 - 17.5 g/dL    POCT Oxy Hemoglobin, Arterial 97.9 94.0 - 98.0 %    POCT Carboxyhemoglobin, Arterial 0.8 %    POCT Methemoglobin, Arterial 0.7 0.0 - 1.5 %    POCT Deoxy Hemoglobin, Arterial 0.6 0.0 - 5.0 %   Blood Gas Arterial Full Panel Unsolicited   Result Value Ref Range    POCT pH, Arterial 7.33 (L) 7.38 - 7.42 pH    POCT pCO2, Arterial 41 38 - 42 mm Hg    POCT pO2, Arterial 329 (H) 85 - 95 mm Hg    POCT SO2, Arterial 99 94 - 100 %    POCT Oxy Hemoglobin, Arterial 97.7 94.0 - 98.0 %    POCT Hematocrit Calculated, Arterial 28.0 (L) 41.0 - 52.0 %    POCT Sodium, Arterial 142 136 - 145 mmol/L    POCT Potassium, Arterial 5.4 (H) 3.5 - 5.3 mmol/L    POCT Chloride, Arterial 114 (H) 98 - 107 mmol/L    POCT Ionized Calcium, Arterial 1.01 (L) 1.10 - 1.33 mmol/L    POCT Glucose, Arterial 109 (H) 74 - 99 mg/dL    POCT Lactate, Arterial 1.1 0.4 - 2.0 mmol/L    POCT Base Excess, Arterial -4.1 (L) -2.0 - 3.0 mmol/L     POCT HCO3 Calculated, Arterial 21.6 (L) 22.0 - 26.0 mmol/L    POCT Hemoglobin, Arterial 9.4 (L) 13.5 - 17.5 g/dL    POCT Anion Gap, Arterial 12 10 - 25 mmo/L    Patient Temperature 37.0 degrees Celsius    FiO2 80 %   Coox Panel, Arterial Unsolicited   Result Value Ref Range    POCT Hemoglobin, Arterial 9.4 (L) 13.5 - 17.5 g/dL    POCT Oxy Hemoglobin, Arterial 97.7 94.0 - 98.0 %    POCT Carboxyhemoglobin, Arterial 0.5 %    POCT Methemoglobin, Arterial 1.1 0.0 - 1.5 %    POCT Deoxy Hemoglobin, Arterial 0.7 0.0 - 5.0 %   Blood Gas Arterial Full Panel Unsolicited   Result Value Ref Range    POCT pH, Arterial 7.34 (L) 7.38 - 7.42 pH    POCT pCO2, Arterial 42 38 - 42 mm Hg    POCT pO2, Arterial 319 (H) 85 - 95 mm Hg    POCT SO2, Arterial 99 94 - 100 %    POCT Oxy Hemoglobin, Arterial 98.1 (H) 94.0 - 98.0 %    POCT Hematocrit Calculated, Arterial 29.0 (L) 41.0 - 52.0 %    POCT Sodium, Arterial 141 136 - 145 mmol/L    POCT Potassium, Arterial 4.3 3.5 - 5.3 mmol/L    POCT Chloride, Arterial 114 (H) 98 - 107 mmol/L    POCT Ionized Calcium, Arterial 1.23 1.10 - 1.33 mmol/L    POCT Glucose, Arterial 73 (L) 74 - 99 mg/dL    POCT Lactate, Arterial 1.6 0.4 - 2.0 mmol/L    POCT Base Excess, Arterial -2.9 (L) -2.0 - 3.0 mmol/L    POCT HCO3 Calculated, Arterial 22.7 22.0 - 26.0 mmol/L    POCT Hemoglobin, Arterial 9.6 (L) 13.5 - 17.5 g/dL    POCT Anion Gap, Arterial 9 (L) 10 - 25 mmo/L    Patient Temperature 37.0 degrees Celsius    FiO2 100 %   Coox Panel, Arterial Unsolicited   Result Value Ref Range    POCT Hemoglobin, Arterial 9.6 (L) 13.5 - 17.5 g/dL    POCT Oxy Hemoglobin, Arterial 98.1 (H) 94.0 - 98.0 %    POCT Carboxyhemoglobin, Arterial 0.7 %    POCT Methemoglobin, Arterial 0.6 0.0 - 1.5 %    POCT Deoxy Hemoglobin, Arterial 0.6 0.0 - 5.0 %   Blood Gas Arterial Full Panel Unsolicited   Result Value Ref Range    POCT pH, Arterial 7.33 (L) 7.38 - 7.42 pH    POCT pCO2, Arterial 40 38 - 42 mm Hg    POCT pO2, Arterial 389 (H) 85 -  95 mm Hg    POCT SO2, Arterial 100 94 - 100 %    POCT Oxy Hemoglobin, Arterial 98.7 (H) 94.0 - 98.0 %    POCT Hematocrit Calculated, Arterial 31.0 (L) 41.0 - 52.0 %    POCT Sodium, Arterial 140 136 - 145 mmol/L    POCT Potassium, Arterial 4.2 3.5 - 5.3 mmol/L    POCT Chloride, Arterial 111 (H) 98 - 107 mmol/L    POCT Ionized Calcium, Arterial 1.14 1.10 - 1.33 mmol/L    POCT Glucose, Arterial 127 (H) 74 - 99 mg/dL    POCT Lactate, Arterial 1.9 0.4 - 2.0 mmol/L    POCT Base Excess, Arterial -4.5 (L) -2.0 - 3.0 mmol/L    POCT HCO3 Calculated, Arterial 21.1 (L) 22.0 - 26.0 mmol/L    POCT Hemoglobin, Arterial 10.4 (L) 13.5 - 17.5 g/dL    POCT Anion Gap, Arterial 12 10 - 25 mmo/L    Patient Temperature 37.0 degrees Celsius    FiO2 100 %   Coox Panel, Arterial Unsolicited   Result Value Ref Range    POCT Hemoglobin, Arterial 10.4 (L) 13.5 - 17.5 g/dL    POCT Oxy Hemoglobin, Arterial 98.7 (H) 94.0 - 98.0 %    POCT Carboxyhemoglobin, Arterial 0.6 %    POCT Methemoglobin, Arterial 0.6 0.0 - 1.5 %    POCT Deoxy Hemoglobin, Arterial 0.0 0.0 - 5.0 %        Plan:    - Bilateral SAP blocks with catheters and single shot subcostal nerve blocks  performed postoepratively on 1/4  - Ambit ball with Ropivacaine 0.2%/NaCl 0.9% 500mL, Rate 7 cc/hr bilaterally  - Ambit medication will not interfere with pain medication prescribed by the primary team.   - Please be aware of local anesthetic toxic dose and absorption variability before considering lidocaine patches  - Acute pain service will follow while catheters in place  - Rest of pain management per primary team    Acute Pain Resident  pg [phone] ph [phone]

## 2024-01-04 NOTE — ANESTHESIA PROCEDURE NOTES
Central Venous Line:    Date/Time: 1/4/2024 7:55 AM    A central venous line was placed in the OR for the following indication(s): central venous access and CVP monitoring.  Staffing  Performed: EDMAR   Authorized by: Jaci Rubio MD    Performed by: EDMAR Billings    Sterility preparation included the following: provider hand hygiene performed prior to central venous catheter insertion, all 5 sterile barriers used (gloves, gown, cap, mask, large sterile drape) during central venous catheter insertion, antiseptic used during central venous catheter insertion and skin prep agent completely dried prior to procedure.  The patient was placed in Trendelenburg position.    Right internal jugular vein was prepped.    The site was prepped with Chlorhexidine.  Size: 9 Fr (8 Fr)   Length: 11.5  Number of Lumens: double lumen    This catheter was not an oximetric catheter.    During the procedure, the following specific steps were taken: target vein identified, needle advanced into vein and blood aspirated and guidewire advanced into vein.  Seldinger technique used.  Procedure performed using ultrasound guidance.  Sterile gel and probe cover used in ultrasound-guided central venous catheter insertion.    Intravenous verification was obtained by ultrasound, venous blood return and manometry.      Post insertion care included: all ports aspirated, all ports flushed easily, guidewire removed intact, Biopatch applied, line sutured in place and dressing applied.    During the procedure the patient experienced: patient tolerated procedure well with no complications.           images stored in chart

## 2024-01-04 NOTE — PROGRESS NOTES
1/1 CTICU    CT SURGERY  1/4 - CABGx2 CAMILO Jimenez  (pod-0)    DC PLAN  TBD - Care Transitions is following to develop a safe & supportive discharge plan in collaboration with multidisciplinary team (&) patient/family/significant others.      PAYER  Primary: [facility] / Secondary: Medicare A     COMPLETED  (X) Daily ongoing review of patient via chart and/or (M-F) IDT rounds  (X) 01/04 - Day of admit: patient new to author. Intubated/Sedated and RUBIA.     Marium Schmitz (LSW, MSW)

## 2024-01-04 NOTE — ANESTHESIA PROCEDURE NOTES
Peripheral Block    Patient location during procedure: OR  Start time: 1/4/2024 1:00 PM  End time: 1/4/2024 1:20 PM  Reason for block: at surgeon's request and post-op pain management  Staffing  Performed: attending   Authorized by: Jaci Rubio MD    Performed by: EDMAR Billings  Preanesthetic Checklist  Completed: patient identified, IV checked, site marked, risks and benefits discussed, surgical consent, monitors and equipment checked, pre-op evaluation and timeout performed   Timeout performed at: 1/4/2024 1:00 PM  Peripheral Block  Patient position: laying flat  Prep: ChloraPrep  Patient monitoring: heart rate, cardiac monitor and continuous pulse ox  Block type: TAP, subcostal and serratus anterior  Laterality: B/L  Injection technique: catheter  Guidance: ultrasound guided  Local infiltration: ropivacaine  Infiltration strength: 0.5 %  Dose: 60 mL  Needle  Needle type: Tuohy   Needle gauge: 18 G  Needle length: 5 cm  Needle localization: ultrasound guidance     image stored in chart  Needle insertion depth: 4 cm  Catheter type: open end  Test dose: negative  Assessment  Injection assessment: negative aspiration for heme, incremental injection and local visualized surrounding nerve on ultrasound  Heart rate change: no  Slow fractionated injection: yes  Additional Notes  Bilateral SAP catheters inserted at T5 SAP plane and bolused with 30 ml of solution and bilateral subcostal TAP blocks (10 ml) done.

## 2024-01-04 NOTE — H&P
CTICU History & Physical    Subjective   HPI:  This is a 68 year old male with past medical history of cataracts, celiac disease, CAD, HLD, lymphocytic colitis, GERD, and sciatica. He arrives to the CTICU s/p CABG x 2 (LIMA->LAD, RA->PDA) and posterior pericardial window.    Cardiac Testing:     TTE: 11/13/23  1. Left ventricular systolic function is mildly decreased.  2. Entire anterior septum, apical anterior segment, and apex are abnormal.  3. Technically difficult despite the use of echocontrast. (Note apical echocontrast images were foreshortened.) The LV apex does appear trabeculated. Note noncontrast images suggestive of LAD territory wall motion abnormality. Also possible inferior hypokinesis. Overall likely mild regional LV systolic dysfunction.  4. Compared with the prior exam from 11/8/2023, today's limited study did include the use of echocontrast, however these images were somewhat foreshortened. Overall likely LAD territory wall motion abnormality with possibly mild regional LV systolic dysfunction.    [facility]: 11/14/23  1. Severe 2 vessel CAD in a right dominant system.  2. Elevated LVEDP.  3. No evidence of aortic stenosis.     Procedure/Surgeon: Dr. Jimenez  Frontliner/Anesthesia: Dr. Rubio  Out of OR Time (document on ventilator card): 1350     OR Course/Issues: Uncomplicated     CPB time: 84 minutes  Cross clamp time: 70 minutes  Echo Pre/Post: RV normal, LV 35-40%/RV normal, LV 50%  Chest Tubes/Drains: 2 mediastinal and 1 left pleural   Temporary wires location/setting: epicardial V wires     Fluids  Crystalloid:   Colloid: 250ml  EBL: 500  UOP: 320     Anesthesia  Intubation: easy grade 1  Intravenous Access: RIJ MAC with mini, 20g RH PIV   Regional anesthesia: B/L SAP with catheter, B/L subcostal TAP single shot  Benzodiazepine dose/last administration: 2mg midazolam total @ 0715  Opioid dose/last administration: 300mcg fentanyl total @ 0931 and 20mg methadone @ 0807  NMB dose/last  administration: last dose of rocuronium @ 1253  TOF/ reversal given: sugammadex 200mg @ 1324  Antibiotic time: 2g cefazolin @ 1157  Temperature on admission to ICU: 36.2C    Past Medical History:   Diagnosis Date    Cataract     Celiac disease     Colitis     Coronary artery disease 12/04/2023    Severe 2 vessel CAD in a right dominant system.  Elevated LVEDP.  No evidence of aortic stenosis.    Hyperlipidemia     Lymphocytic colitis      Past Surgical History:   Procedure Laterality Date    CARDIAC CATHETERIZATION N/A 12/04/2023    Procedure: Left Heart Cath, With LV;  Surgeon: Javier Ma MD;  Location: [facility] Cardiac Cath Lab;  Service: Cardiovascular;  Laterality: N/A;    LUMBAR SPINE SURGERY       Medications Prior to Admission   Medication Sig Dispense Refill Last Dose    aspirin 81 mg chewable tablet Chew 1 tablet (81 mg) once daily. 90 tablet 3 1/4/2024    b complex 0.4 mg tablet Take 1 tablet by mouth once daily.   Past Month    cholecalciferol (Vitamin D3) 50 MCG (2000 UT) tablet Take 1 tablet (50 mcg) by mouth once daily.   Past Month    ibuprofen 800 mg tablet Take 600 mg by mouth every 8 hours if needed for mild pain (1 - 3) (soreness after exercising).   Past Month at >2 weeks    losartan (Cozaar) 25 mg tablet Take 1 tablet (25 mg) by mouth once daily. 30 tablet 11 1/2/2024    nitroglycerin (Nitrostat) 0.4 mg SL tablet Place 1 tablet (0.4 mg) under the tongue every 5 minutes if needed for chest pain. May repeat dose every 5 minutes for up to 3 doses total. 100 tablet 11     rosuvastatin (Crestor) 20 mg tablet Take 1 tablet (20 mg) by mouth once daily. 90 tablet 3 1/3/2024     Fentanyl and Lactose  Social History     Tobacco Use    Smoking status: Never    Smokeless tobacco: Never   Substance Use Topics    Alcohol use: Yes     Comment: SOCIALLY    Drug use: Never     Family History   Problem Relation Name Age of Onset    Alzheimer's disease Mother  80    Other (COVID) Father  90       Review of  Systems:  Intubated and sedated    Objective   Vitals:  Most Recent:  Vitals:    01/04/24 0630   BP: 136/77   Pulse: 65   Resp: 16   Temp: 36 °C (96.8 °F)   SpO2: 99%       24hr Min/Max:  Temp  Min: 36 °C (96.8 °F)  Max: 36 °C (96.8 °F)  Pulse  Min: 65  Max: 65  BP  Min: 136/77  Max: 136/77  Resp  Min: 16  Max: 16  SpO2  Min: 99 %  Max: 99 %    I/O:  No intake/output data recorded.    LDA:  CVC 01/04/24 Double lumen Right Internal jugular (Active)   Placement Date/Time: 01/04/24 (c) 0755   Hand Hygiene Performed Prior to CVC Insertion: Yes  Site Prep: Chlorhexidine   Site Prep Agent has Completely Dried Before Insertion: Yes  All 5 Sterile Barriers Used (Gloves, Gown, Cap, Mask, Large Sterile Bri...   Number of days: 0       Arterial Line 01/04/24 Right Brachial (Active)   Placement Date/Time: 01/04/24 (c) 0740   Size: 20 G  Orientation: Right  Location: Brachial  Securement Method: Transparent dressing  Patient Tolerance: Tolerated well   Number of days: 0       ETT  7.5 mm (Active)   Placement Date/Time: 01/04/24 (c) 0747   Mask Ventilation: Vent by mask  Technique: Direct laryngoscopy  ETT Type: ETT - single  Single Lumen Tube Size: 7.5 mm  Cuffed: Yes  Laryngoscope: Paulo  Blade Size: 4  Location: Oral  Grade View: Full view...   Number of days: 0       Urethral Catheter Non-latex;Temperature probe 14 Fr. (Active)   Placement Date/Time: 01/04/24 0750   Earliest Known Present: 01/04/24  Placed by: Jos Hopson  Hand Hygiene Completed: Yes  Catheter Type: Non-latex;Temperature probe  Tube Size (Fr.): 14 Fr.  Catheter Balloon Size: 10 mL  Urine Returned: Yes   Number of days: 0       Physical Exam:   Constitutional: Male patient intubated and sedated in ICU bed on ventilator. In no acute distress, but not actively participating in care at this time  Skin: Warm and dry throughout  Eyes: Anicteric sclera, PERRL, clear  Head/Neck: Right IJ MAC, dressing c,d,i - no hematoma or bleeding  Respiratory/Thorax: Orally  "intubated. Clear breath sounds throughout. Minimal ETT /oral secretions - clear. 1 left pleural and 2 mediastinal chest tubes to -20 suction, serosanguinous drainage. Ventricular Pacing wires present  Cardiovascular: RRR. No murmurs appreciated. NSR on tele with HR 60s. Peripheral pulses intact. Sternum stable - dressing CDI    Gastrointestinal: OG tube with minimal output. Abdomen soft, nontender, nondistended  Genitourinary: Goins in place draining clear yellow urine  Extremities: Palpable right radial pulse 2+. 2+ pulses to bilateral PT/DP. SCDs to BLE. No clubbing or cyanosis to nailbeds, warm throughout. No pitting edema. Left brachial arterial line, dressing c,d,i - no hematoma. Ace wrap on left arm  Neurological: Intubated and sedated on Propofol and precedex, not yet following commands. PERRL - 2mm bilaterally    Lab Review:            No lab exists for component: \"LABALBU\"      Results from last 7 days   Lab Units 01/04/24  1114   POCT PH, ARTERIAL pH 7.34*   POCT PCO2, ARTERIAL mm Hg 42   POCT PO2, ARTERIAL mm Hg 319*   POCT HCO3 CALCULATED, ARTERIAL mmol/L 22.7   POCT BASE EXCESS, ARTERIAL mmol/L -2.9*       Most recent labs and imaging reviewed.    Daily Risk Screen         Assessment/Plan     Assessment:  This is a 68 year old male with past medical history of cataracts, celiac disease, CAD, HLD, lymphocytic colitis, GERD, and sciatica. He arrives to the CTICU s/p CABG x 2 (LIMA->LAD, RA->PDA) and posterior pericardial window on epinephrine 0.02, norepinephrine 0.04, nitroglycerin 10mcg/min, propofol 25mcg/kg/min, and dexmedetomidine 0.2mcg/kg/min    Plan:  NEURO: History of sciatica with prior lumbar spinal surgery. Patient is intubated and sedated on propofol and precedex infusions. Acute post operative pain well controlled. Reversed in the OR prior to arrival to CTICU. -->  - Serial neuro and pain assessments   - Continue propofol and precedex then daily sedation vacation at minimum   - Scheduled " Tylenol   - PRN oxycodone  - PRN dilaudid for pain   - PT Consult, OOB to chair as tolerated, chair position if not tolerated   - CAM ICU score qshift  - Sleep/wake cycle hygiene    CV: Patient has a history of CAD and HLD. Is now status post CABG x 2 (LIMA->LAD, RA->PDA) and posterior pericardial window. Pre/Post EF: RV normal, LV 35-40%/RV normal, LV 50%. Arrived to CTICU on nitroglycerin 10mcg/min, epinephrine 0.02 mcg/kg/min, and norepinephrine 0.04/kg/min. V epicardial wires unattached due to lack of capture. -->  - Maintain goal MAP 70-90  - Wean epinephrine to MAP 70-90  - Titrate norepinephrine to MAP 70-90  - Continue low dose nitroglycerin 5mcg/min for radial graft. Transition to isorbide tomorrow.  - Volume resuscitate as clinically indicated  - V epicardial wires unattached due to lack of capture  - Start aspirin within 6 hours post op  - Start atorvastatin 80mg tonight  - Hold home losartan and rosuvastatin    PULM: No history of pulmonary disease. Currently intubated on ventilator. 2 mediastinal chest tubes and 1 left pleural chest tube. Reversed in OR prior to arrival. -->  - F/u post op CXR  - Wean ventilator settings towards CPAP & extubation   - Wean FiO2 maintaining SpO2 >92%.   - IS q1h and OOB to chair when extubated  - Chest tubes to wall suction.    GI: History of celiac disease, lymphocytic colitis, and GERD. OG in place. -->  - Continue PPI until extubated  - NPO, will perform bedside swallow eval post extubation   - Colace/senna BID and miralax BID  - Hold home vitamin D and vitamin B    : CSA-FARTUN Risk Score low. No history of renal disease, baseline creatinine 0.99. Creatinine stable post-op. Acosta in place and making adequate UOP. -->  - Continue acosta catheter for strict I/Os.  - Goal UOP 0.5ml/kg/hr  - RFP as clinically indicated  - Replete electrolytes per CTICU protocol    ENDO: No history of diabetes. A1c: 5.3. -->  - Maintain BG <180, insulin per CTICU protocol    HEME: Acute  blood loss anemia. -->    - Monitor drain output volume and characteristics  - CBC, coags, and fibrinogen post op and as clinically indicated  - Start ASA 6hrs post-op for CABG  - SQH tomorrow   - SCDs for DVT prophylaxis.  - Last type and screen: 12/22/23 - ordered today    ID: Afebrile, no current indications of infection. MRSA negative. -->  - Trend temp q4h  - Periop cefazolin x 48hrs    Skin: No active skin issues.  - Preventative Mepilex dressings in place on sacrum and heels  - Change preventative Mepilex weekly or more frequently as indicated (when moist/soiled)   - Every shift skin assessment per nursing and weekly ICU skin rounds  - Moisture barrier to be applied with geena care  - Active skin problems addressed with nursing on daily rounds    Proph:  SCDs  PPI    G:  Line  Right IJ MAC w [place] placed 1/4/24  Right brachial a-line placed 1/4/24  Goins placed 1/4/24    F: Family: will update at bedside postoperatively.     A,B,C,D,E,F,G: reviewed    I personally spent 60 minutes of critical care time directly and personally managing the patient exclusive of separately billable procedures.     Dispo: CTICU care for now.    CTICU TEAM PHONE [phone]

## 2024-01-04 NOTE — PROGRESS NOTES
Samir Howell is a 68 y.o. male on day 0 of admission presenting with Coronary artery disease of native artery of native heart with stable angina pectoris (CMS/[facility]) and is now s/p cabgx2 on 1/4/23.      Objective     Physical Exam    Last Recorded Vitals  Blood pressure 136/77, pulse 59, temperature 36 °C (96.8 °F), temperature source Temporal, resp. rate 16, height 1.829 m (6'), weight 93 kg (205 lb 0.4 oz), SpO2 100 %.  Intake/Output last 3 Shifts:  No intake/output data recorded.    Relevant Results  Results for orders placed or performed during the hospital encounter of 01/04/24 (from the past 24 hour(s))   Verify ABO/Rh Group Test (VERAB)   Result Value Ref Range    ABO TYPE A     Rh TYPE POS    Prepare RBC: 4 Units   Result Value Ref Range    PRODUCT CODE T0439A46     Unit Number [id number]     Unit ABO A     Unit RH POS     XM INTEP COMP     Dispense Status XM     Blood Expiration Date January 29, 2024 23:59 EST     PRODUCT BLOOD TYPE 6200     UNIT VOLUME 350     PRODUCT CODE A3689T25     Unit Number [id number]     Unit ABO A     Unit RH POS     XM INTEP COMP     Dispense Status XM     Blood Expiration Date January 29, 2024 23:59 EST     PRODUCT BLOOD TYPE 6200     UNIT VOLUME 350     PRODUCT CODE R8550V71     Unit Number [id number]     Unit ABO A     Unit RH POS     XM INTEP COMP     Dispense Status XM     Blood Expiration Date February 03, 2024 23:59 EST     PRODUCT BLOOD TYPE 6200     UNIT VOLUME 350     PRODUCT CODE D2776C01     Unit Number [id number]     Unit ABO A     Unit RH POS     XM INTEP COMP     Dispense Status XM     Blood Expiration Date January 29, 2024 23:59 EST     PRODUCT BLOOD TYPE 6200     UNIT VOLUME 350    Blood Gas Arterial Full Panel Unsolicited   Result Value Ref Range    POCT pH, Arterial 7.47 (H) 7.38 - 7.42 pH    POCT pCO2, Arterial 36 (L) 38 - 42 mm Hg    POCT pO2, Arterial 77 (L) 85 - 95 mm Hg    POCT SO2, Arterial 97 94 - 100 %    POCT Oxy Hemoglobin,  Arterial 94.6 94.0 - 98.0 %    POCT Hematocrit Calculated, Arterial 38.0 (L) 41.0 - 52.0 %    POCT Sodium, Arterial 138 136 - 145 mmol/L    POCT Potassium, Arterial 3.8 3.5 - 5.3 mmol/L    POCT Chloride, Arterial 105 98 - 107 mmol/L    POCT Ionized Calcium, Arterial 1.19 1.10 - 1.33 mmol/L    POCT Glucose, Arterial 94 74 - 99 mg/dL    POCT Lactate, Arterial 0.8 0.4 - 2.0 mmol/L    POCT Base Excess, Arterial 2.6 -2.0 - 3.0 mmol/L    POCT HCO3 Calculated, Arterial 26.2 (H) 22.0 - 26.0 mmol/L    POCT Hemoglobin, Arterial 12.5 (L) 13.5 - 17.5 g/dL    POCT Anion Gap, Arterial 11 10 - 25 mmo/L    Patient Temperature 37.0 degrees Celsius    FiO2 21 %   Coox Panel, Arterial Unsolicited   Result Value Ref Range    POCT Hemoglobin, Arterial 12.5 (L) 13.5 - 17.5 g/dL    POCT Oxy Hemoglobin, Arterial 94.6 94.0 - 98.0 %    POCT Carboxyhemoglobin, Arterial 1.0 %    POCT Methemoglobin, Arterial 0.9 0.0 - 1.5 %    POCT Deoxy Hemoglobin, Arterial 3.4 0.0 - 5.0 %   Blood Gas Arterial Full Panel Unsolicited   Result Value Ref Range    POCT pH, Arterial 7.40 7.38 - 7.42 pH    POCT pCO2, Arterial 43 (H) 38 - 42 mm Hg    POCT pO2, Arterial 288 (H) 85 - 95 mm Hg    POCT SO2, Arterial 99 94 - 100 %    POCT Oxy Hemoglobin, Arterial 97.8 94.0 - 98.0 %    POCT Hematocrit Calculated, Arterial 35.0 (L) 41.0 - 52.0 %    POCT Sodium, Arterial 137 136 - 145 mmol/L    POCT Potassium, Arterial 4.3 3.5 - 5.3 mmol/L    POCT Chloride, Arterial 105 98 - 107 mmol/L    POCT Ionized Calcium, Arterial 1.18 1.10 - 1.33 mmol/L    POCT Glucose, Arterial 118 (H) 74 - 99 mg/dL    POCT Lactate, Arterial 0.7 0.4 - 2.0 mmol/L    POCT Base Excess, Arterial 1.5 -2.0 - 3.0 mmol/L    POCT HCO3 Calculated, Arterial 26.6 (H) 22.0 - 26.0 mmol/L    POCT Hemoglobin, Arterial 11.5 (L) 13.5 - 17.5 g/dL    POCT Anion Gap, Arterial 10 10 - 25 mmo/L    Patient Temperature 37.0 degrees Celsius    FiO2 60 %   Coox Panel, Arterial Unsolicited   Result Value Ref Range    POCT  Hemoglobin, Arterial 11.5 (L) 13.5 - 17.5 g/dL    POCT Oxy Hemoglobin, Arterial 97.8 94.0 - 98.0 %    POCT Carboxyhemoglobin, Arterial 0.7 %    POCT Methemoglobin, Arterial 0.9 0.0 - 1.5 %    POCT Deoxy Hemoglobin, Arterial 0.6 0.0 - 5.0 %   Blood Gas Arterial Full Panel Unsolicited   Result Value Ref Range    POCT pH, Arterial 7.38 7.38 - 7.42 pH    POCT pCO2, Arterial 42 38 - 42 mm Hg    POCT pO2, Arterial 342 (H) 85 - 95 mm Hg    POCT SO2, Arterial 100 94 - 100 %    POCT Oxy Hemoglobin, Arterial 99.0 (H) 94.0 - 98.0 %    POCT Hematocrit Calculated, Arterial 29.0 (L) 41.0 - 52.0 %    POCT Sodium, Arterial 137 136 - 145 mmol/L    POCT Potassium, Arterial 6.9 (HH) 3.5 - 5.3 mmol/L    POCT Chloride, Arterial 110 (H) 98 - 107 mmol/L    POCT Ionized Calcium, Arterial 1.14 1.10 - 1.33 mmol/L    POCT Glucose, Arterial 125 (H) 74 - 99 mg/dL    POCT Lactate, Arterial 0.8 0.4 - 2.0 mmol/L    POCT Base Excess, Arterial -0.4 -2.0 - 3.0 mmol/L    POCT HCO3 Calculated, Arterial 24.8 22.0 - 26.0 mmol/L    POCT Hemoglobin, Arterial 9.8 (L) 13.5 - 17.5 g/dL    POCT Anion Gap, Arterial 9 (L) 10 - 25 mmo/L    Patient Temperature 37.0 degrees Celsius    FiO2 80 %   Coox Panel, Arterial Unsolicited   Result Value Ref Range    POCT Hemoglobin, Arterial 9.8 (L) 13.5 - 17.5 g/dL    POCT Oxy Hemoglobin, Arterial 99.0 (H) 94.0 - 98.0 %    POCT Carboxyhemoglobin, Arterial 0.7 %    POCT Methemoglobin, Arterial 0.0 0.0 - 1.5 %    POCT Deoxy Hemoglobin, Arterial 0.2 0.0 - 5.0 %   Blood Gas Venous Full Panel Unsolicited   Result Value Ref Range    POCT pH, Venous 7.33 7.33 - 7.43 pH    POCT pCO2, Venous 48 41 - 51 mm Hg    POCT pO2, Venous 49 (H) 35 - 45 mm Hg    POCT SO2, Venous 81 (H) 45 - 75 %    POCT Oxy Hemoglobin, Venous 79.5 (H) 45.0 - 75.0 %    POCT Hematocrit Calculated, Venous 29.0 (L) 41.0 - 52.0 %    POCT Sodium, Venous 138 136 - 145 mmol/L    POCT Potassium, Venous 6.8 (HH) 3.5 - 5.3 mmol/L    POCT Chloride, Venous 108 (H) 98 - 107  mmol/L    POCT Ionized Calicum, Venous 1.17 1.10 - 1.33 mmol/L    POCT Glucose, Venous 122 (H) 74 - 99 mg/dL    POCT Lactate, Venous 0.8 0.4 - 2.0 mmol/L    POCT Base Excess, Venous -0.9 -2.0 - 3.0 mmol/L    POCT HCO3 Calculated, Venous 25.3 22.0 - 26.0 mmol/L    POCT Hemoglobin, Venous 9.7 (L) 13.5 - 17.5 g/dL    POCT Anion Gap, Venous 12.0 10.0 - 25.0 mmol/L    Patient Temperature 37.0 degrees Celsius    FiO2 80 %   Coox Panel, Venous Unsolicited   Result Value Ref Range    POCT Carboxyhemoglobin, Venous 1.3 %    POCT Methemoglobin, Venous 0.2 0.0 - 1.5 %   Blood Gas Arterial Full Panel Unsolicited   Result Value Ref Range    POCT pH, Arterial 7.37 (L) 7.38 - 7.42 pH    POCT pCO2, Arterial 43 (H) 38 - 42 mm Hg    POCT pO2, Arterial 314 (H) 85 - 95 mm Hg    POCT SO2, Arterial 100 94 - 100 %    POCT Oxy Hemoglobin, Arterial 98.4 (H) 94.0 - 98.0 %    POCT Hematocrit Calculated, Arterial 29.0 (L) 41.0 - 52.0 %    POCT Sodium, Arterial 140 136 - 145 mmol/L    POCT Potassium, Arterial 6.0 (H) 3.5 - 5.3 mmol/L    POCT Chloride, Arterial 111 (H) 98 - 107 mmol/L    POCT Ionized Calcium, Arterial 1.08 (L) 1.10 - 1.33 mmol/L    POCT Glucose, Arterial 120 (H) 74 - 99 mg/dL    POCT Lactate, Arterial 0.8 0.4 - 2.0 mmol/L    POCT Base Excess, Arterial -0.5 -2.0 - 3.0 mmol/L    POCT HCO3 Calculated, Arterial 24.9 22.0 - 26.0 mmol/L    POCT Hemoglobin, Arterial 9.8 (L) 13.5 - 17.5 g/dL    POCT Anion Gap, Arterial 10 10 - 25 mmo/L    Patient Temperature 37.0 degrees Celsius    FiO2 80 %   Coox Panel, Arterial Unsolicited   Result Value Ref Range    POCT Hemoglobin, Arterial 9.8 (L) 13.5 - 17.5 g/dL    POCT Oxy Hemoglobin, Arterial 98.4 (H) 94.0 - 98.0 %    POCT Carboxyhemoglobin, Arterial 0.8 %    POCT Methemoglobin, Arterial 0.5 0.0 - 1.5 %    POCT Deoxy Hemoglobin, Arterial 0.4 0.0 - 5.0 %   Blood Gas Arterial Full Panel Unsolicited   Result Value Ref Range    POCT pH, Arterial 7.31 (L) 7.38 - 7.42 pH    POCT pCO2, Arterial 44  (H) 38 - 42 mm Hg    POCT pO2, Arterial 303 (H) 85 - 95 mm Hg    POCT SO2, Arterial 100 94 - 100 %    POCT Oxy Hemoglobin, Arterial 98.6 (H) 94.0 - 98.0 %    POCT Hematocrit Calculated, Arterial 28.0 (L) 41.0 - 52.0 %    POCT Sodium, Arterial 140 136 - 145 mmol/L    POCT Potassium, Arterial 5.4 (H) 3.5 - 5.3 mmol/L    POCT Chloride, Arterial 112 (H) 98 - 107 mmol/L    POCT Ionized Calcium, Arterial 1.07 (L) 1.10 - 1.33 mmol/L    POCT Glucose, Arterial 127 (H) 74 - 99 mg/dL    POCT Lactate, Arterial 0.9 0.4 - 2.0 mmol/L    POCT Base Excess, Arterial -3.9 (L) -2.0 - 3.0 mmol/L    POCT HCO3 Calculated, Arterial 22.2 22.0 - 26.0 mmol/L    POCT Hemoglobin, Arterial 9.3 (L) 13.5 - 17.5 g/dL    POCT Anion Gap, Arterial 11 10 - 25 mmo/L    Patient Temperature 37.0 degrees Celsius    FiO2 80 %   Coox Panel, Arterial Unsolicited   Result Value Ref Range    POCT Hemoglobin, Arterial 9.3 (L) 13.5 - 17.5 g/dL    POCT Oxy Hemoglobin, Arterial 98.6 (H) 94.0 - 98.0 %    POCT Carboxyhemoglobin, Arterial 0.8 %    POCT Methemoglobin, Arterial 0.4 0.0 - 1.5 %    POCT Deoxy Hemoglobin, Arterial 0.2 0.0 - 5.0 %   Blood Gas Arterial Full Panel Unsolicited   Result Value Ref Range    POCT pH, Arterial 7.35 (L) 7.38 - 7.42 pH    POCT pCO2, Arterial 43 (H) 38 - 42 mm Hg    POCT pO2, Arterial 278 (H) 85 - 95 mm Hg    POCT SO2, Arterial 99 94 - 100 %    POCT Oxy Hemoglobin, Arterial 97.9 94.0 - 98.0 %    POCT Hematocrit Calculated, Arterial 30.0 (L) 41.0 - 52.0 %    POCT Sodium, Arterial 141 136 - 145 mmol/L    POCT Potassium, Arterial 5.6 (H) 3.5 - 5.3 mmol/L    POCT Chloride, Arterial 112 (H) 98 - 107 mmol/L    POCT Ionized Calcium, Arterial 1.06 (L) 1.10 - 1.33 mmol/L    POCT Glucose, Arterial 86 74 - 99 mg/dL    POCT Lactate, Arterial 1.0 0.4 - 2.0 mmol/L    POCT Base Excess, Arterial -1.9 -2.0 - 3.0 mmol/L    POCT HCO3 Calculated, Arterial 23.7 22.0 - 26.0 mmol/L    POCT Hemoglobin, Arterial 10.0 (L) 13.5 - 17.5 g/dL    POCT Anion Gap,  Arterial 11 10 - 25 mmo/L    Patient Temperature 37.0 degrees Celsius    FiO2 80 %   Coox Panel, Arterial Unsolicited   Result Value Ref Range    POCT Hemoglobin, Arterial 10.0 (L) 13.5 - 17.5 g/dL    POCT Oxy Hemoglobin, Arterial 97.9 94.0 - 98.0 %    POCT Carboxyhemoglobin, Arterial 0.8 %    POCT Methemoglobin, Arterial 0.7 0.0 - 1.5 %    POCT Deoxy Hemoglobin, Arterial 0.6 0.0 - 5.0 %   Blood Gas Arterial Full Panel Unsolicited   Result Value Ref Range    POCT pH, Arterial 7.33 (L) 7.38 - 7.42 pH    POCT pCO2, Arterial 41 38 - 42 mm Hg    POCT pO2, Arterial 329 (H) 85 - 95 mm Hg    POCT SO2, Arterial 99 94 - 100 %    POCT Oxy Hemoglobin, Arterial 97.7 94.0 - 98.0 %    POCT Hematocrit Calculated, Arterial 28.0 (L) 41.0 - 52.0 %    POCT Sodium, Arterial 142 136 - 145 mmol/L    POCT Potassium, Arterial 5.4 (H) 3.5 - 5.3 mmol/L    POCT Chloride, Arterial 114 (H) 98 - 107 mmol/L    POCT Ionized Calcium, Arterial 1.01 (L) 1.10 - 1.33 mmol/L    POCT Glucose, Arterial 109 (H) 74 - 99 mg/dL    POCT Lactate, Arterial 1.1 0.4 - 2.0 mmol/L    POCT Base Excess, Arterial -4.1 (L) -2.0 - 3.0 mmol/L    POCT HCO3 Calculated, Arterial 21.6 (L) 22.0 - 26.0 mmol/L    POCT Hemoglobin, Arterial 9.4 (L) 13.5 - 17.5 g/dL    POCT Anion Gap, Arterial 12 10 - 25 mmo/L    Patient Temperature 37.0 degrees Celsius    FiO2 80 %   Coox Panel, Arterial Unsolicited   Result Value Ref Range    POCT Hemoglobin, Arterial 9.4 (L) 13.5 - 17.5 g/dL    POCT Oxy Hemoglobin, Arterial 97.7 94.0 - 98.0 %    POCT Carboxyhemoglobin, Arterial 0.5 %    POCT Methemoglobin, Arterial 1.1 0.0 - 1.5 %    POCT Deoxy Hemoglobin, Arterial 0.7 0.0 - 5.0 %   Blood Gas Arterial Full Panel Unsolicited   Result Value Ref Range    POCT pH, Arterial 7.34 (L) 7.38 - 7.42 pH    POCT pCO2, Arterial 42 38 - 42 mm Hg    POCT pO2, Arterial 319 (H) 85 - 95 mm Hg    POCT SO2, Arterial 99 94 - 100 %    POCT Oxy Hemoglobin, Arterial 98.1 (H) 94.0 - 98.0 %    POCT Hematocrit Calculated,  Arterial 29.0 (L) 41.0 - 52.0 %    POCT Sodium, Arterial 141 136 - 145 mmol/L    POCT Potassium, Arterial 4.3 3.5 - 5.3 mmol/L    POCT Chloride, Arterial 114 (H) 98 - 107 mmol/L    POCT Ionized Calcium, Arterial 1.23 1.10 - 1.33 mmol/L    POCT Glucose, Arterial 73 (L) 74 - 99 mg/dL    POCT Lactate, Arterial 1.6 0.4 - 2.0 mmol/L    POCT Base Excess, Arterial -2.9 (L) -2.0 - 3.0 mmol/L    POCT HCO3 Calculated, Arterial 22.7 22.0 - 26.0 mmol/L    POCT Hemoglobin, Arterial 9.6 (L) 13.5 - 17.5 g/dL    POCT Anion Gap, Arterial 9 (L) 10 - 25 mmo/L    Patient Temperature 37.0 degrees Celsius    FiO2 100 %   Coox Panel, Arterial Unsolicited   Result Value Ref Range    POCT Hemoglobin, Arterial 9.6 (L) 13.5 - 17.5 g/dL    POCT Oxy Hemoglobin, Arterial 98.1 (H) 94.0 - 98.0 %    POCT Carboxyhemoglobin, Arterial 0.7 %    POCT Methemoglobin, Arterial 0.6 0.0 - 1.5 %    POCT Deoxy Hemoglobin, Arterial 0.6 0.0 - 5.0 %   Blood Gas Arterial Full Panel Unsolicited   Result Value Ref Range    POCT pH, Arterial 7.33 (L) 7.38 - 7.42 pH    POCT pCO2, Arterial 40 38 - 42 mm Hg    POCT pO2, Arterial 389 (H) 85 - 95 mm Hg    POCT SO2, Arterial 100 94 - 100 %    POCT Oxy Hemoglobin, Arterial 98.7 (H) 94.0 - 98.0 %    POCT Hematocrit Calculated, Arterial 31.0 (L) 41.0 - 52.0 %    POCT Sodium, Arterial 140 136 - 145 mmol/L    POCT Potassium, Arterial 4.2 3.5 - 5.3 mmol/L    POCT Chloride, Arterial 111 (H) 98 - 107 mmol/L    POCT Ionized Calcium, Arterial 1.14 1.10 - 1.33 mmol/L    POCT Glucose, Arterial 127 (H) 74 - 99 mg/dL    POCT Lactate, Arterial 1.9 0.4 - 2.0 mmol/L    POCT Base Excess, Arterial -4.5 (L) -2.0 - 3.0 mmol/L    POCT HCO3 Calculated, Arterial 21.1 (L) 22.0 - 26.0 mmol/L    POCT Hemoglobin, Arterial 10.4 (L) 13.5 - 17.5 g/dL    POCT Anion Gap, Arterial 12 10 - 25 mmo/L    Patient Temperature 37.0 degrees Celsius    FiO2 100 %   Coox Panel, Arterial Unsolicited   Result Value Ref Range    POCT Hemoglobin, Arterial 10.4 (L) 13.5  - 17.5 g/dL    POCT Oxy Hemoglobin, Arterial 98.7 (H) 94.0 - 98.0 %    POCT Carboxyhemoglobin, Arterial 0.6 %    POCT Methemoglobin, Arterial 0.6 0.0 - 1.5 %    POCT Deoxy Hemoglobin, Arterial 0.0 0.0 - 5.0 %      Assessment/Plan   Principal Problem:    Coronary artery disease of native artery of native heart with stable angina pectoris (CMS/[facility])  Active Problems:    2-vessel coronary artery disease    1) s/p CABGx2 (LIMA->LAD and RA->PDA)   Ef35->50% post-pump   Statin, aspirin   NTG for radial   Reverse/SAT/SBT->WTE   Fast track  2) Hx of colitis    Due to the high probability of life threatening clinical decompensation, the patient required critical care time evaluating and managing this patient.  Critical care time included obtaining a history, examining the patient, ordering and reviewing studies, discussing, developing, and implementing a management plan, evaluating the patient's response to treatment, and discussion with other care team providers. I saw and evaluated the patient myself. I reviewed the provider's documentation and discussed the patient with the provider. Critical care time was performed exclusive of billable procedures.    Critical Care Time: 38 minutes        Aniceto Cheung MD

## 2024-01-04 NOTE — ANESTHESIA PROCEDURE NOTES
Airway  Date/Time: 1/4/2024 7:47 AM  Urgency: elective    Airway not difficult    Staffing  Performed: EDMAR   Authorized by: Jaci Rubio MD    Performed by: EDMAR Billings  Patient location during procedure: OR    Indications and Patient Condition  Indications for airway management: anesthesia and airway protection  Spontaneous Ventilation: absent  Sedation level: deep  Preoxygenated: yes  Patient position: sniffing  MILS not maintained throughout  Mask difficulty assessment: 1 - vent by mask    Final Airway Details  Final airway type: endotracheal airway      Successful airway: ETT  Cuffed: yes   Successful intubation technique: direct laryngoscopy  Endotracheal tube insertion site: oral  Blade: Paulo  Blade size: #4  ETT size (mm): 7.5  Cormack-Lehane Classification: grade I - full view of glottis  Placement verified by: chest auscultation and capnometry   Measured from: lips  ETT to lips (cm): 22  Number of attempts at approach: 1

## 2024-01-04 NOTE — PROGRESS NOTES
"Pharmacy Medication History Review    Jones Howell \"Angela" is a 68 y.o. male admitted for Coronary artery disease of native artery of native heart with stable angina pectoris (CMS/[facility]). Pharmacy reviewed the patient's [id number] medications and allergies for accuracy.    The list below reflects the updated PTA list.   Comments regarding how patient may be taking medications differently can be found in the Admit Orders Activity  Prior to Admission Medications   Prescriptions Last Dose Informant Patient Reported?   aspirin 81 mg chewable tablet 1/4/2024 Self No   Sig: Chew 1 tablet (81 mg) once daily.   b complex 0.4 mg tablet Past Month Self Yes   Sig: Take 1 tablet by mouth once daily.   cholecalciferol (Vitamin D3) 50 MCG (2000 UT) tablet Past Month Self Yes   Sig: Take 1 tablet (50 mcg) by mouth once daily.   ibuprofen 800 mg tablet Past Month at >2 weeks Self Yes   Sig: Take 600 mg by mouth every 8 hours if needed for mild pain (1 - 3) (soreness after exercising).   losartan (Cozaar) 25 mg tablet 1/2/2024 Self No   Sig: Take 1 tablet (25 mg) by mouth once daily.   nitroglycerin (Nitrostat) 0.4 mg SL tablet  Self No   Sig: Place 1 tablet (0.4 mg) under the tongue every 5 minutes if needed for chest pain. May repeat dose every 5 minutes for up to 3 doses total.   rosuvastatin (Crestor) 20 mg tablet 1/3/2024 Self No   Sig: Take 1 tablet (20 mg) by mouth once daily.      Facility-Administered Medications: None        The list below reflects the updated allergy list. Please review each documented allergy for additional clarification and justification.  Allergies  Reviewed by Joni Alexander RPh on 1/4/2024        Severity Reactions Comments    Fentanyl Not Specified Other Diaphoresis and bradycardia    Lactose Not Specified Diarrhea             M2B service not offered prior to surgery, please reassess prior to patient discharge if Meds to Beds is desired.     Sources:   Pt interview  Dispense hx  OARRS - no " "hx          Joni Alexander, PharmD  Transitions of Care Pharmacist    Secure Chat preferred   If no response call [phone] or Vocera \"Med Rec\"   "

## 2024-01-04 NOTE — ANESTHESIA POSTPROCEDURE EVALUATION
"Patient: Jones Howell \"Samir\"    Procedure Summary       Date: 01/04/24 Room / Location: [facility] OR 21 / [facility] OR    Anesthesia Start: 0714 Anesthesia Stop: 1407    Procedure: Median Sternotomy, Coronary Artery BYpass Grafting x 2 with Left Internal Thoracic Artery and Radial, posterior pericardiotomy (Chest) Diagnosis:       Coronary artery disease of native artery of native heart with stable angina pectoris (CMS/HCC)      (Coronary artery disease of native artery of native heart with stable angina pectoris (CMS/HCC) [I25.118])    Surgeons: Juan A Jimenez MD Responsible Provider: Jaci Rubio MD    Anesthesia Type: general ASA Status: 4            Anesthesia Type: general    Vitals Value Taken Time   /65 01/04/24 1421   Temp 36.4 01/04/24 1421   Pulse 67 01/04/24 1421   Resp 13 01/04/24 1421   SpO2 100 % 01/04/24 1421   Vitals shown include unvalidated device data.    Anesthesia Post Evaluation    Patient location during evaluation: ICU  Patient participation: complete - patient cannot participate  Level of consciousness: sedated  Pain score: 0  Pain management: adequate  Airway patency: patent  Cardiovascular status: acceptable  Respiratory status: acceptable  Hydration status: acceptable  Postoperative Nausea and Vomiting: none        There were no known notable events for this encounter.    "

## 2024-01-04 NOTE — ANESTHESIA PROCEDURE NOTES
Peripheral IV  Date/Time: 1/4/2024 7:00 AM      Placement  Needle size: 20 G  Laterality: right  Location: hand  Local anesthetic: injectable  Site prep: alcohol  Technique: anatomical landmarks  Attempts: 2

## 2024-01-04 NOTE — ANESTHESIA PROCEDURE NOTES
Arterial Line:    Date/Time: 1/4/2024 7:40 AM    Staffing  Performed: EDMAR   Authorized by: Jaci Rubio MD    Performed by: EDMAR Billings    An arterial line was placed. Procedure performed using ultrasound guidance.in the OR for the following indication(s): continuous blood pressure monitoring and blood sampling needed.    A 20 gauge (size), 12 cm (length), Angiocath (type) catheter was placed into the Right brachial artery, secured by Tegadeestrada   Seldinger technique used.  Events:  patient tolerated procedure well with no complications.

## 2024-01-04 NOTE — OP NOTE
"Median Sternotomy, Coronary Artery BYpass Grafting x 2 with Left Internal Thoracic Artery and Radial, posterior pericardiotomy Operative Note     Date: 2024  OR Location: [facility] OR    Name: Jones Howell \"Samir\", : 1955, Age: 68 y.o., MRN: [id number], Sex: male    Diagnosis  Pre-op Diagnosis     * Coronary artery disease of native artery of native heart with stable angina pectoris (CMS/HCC) [I25.118] Post-op Diagnosis     * Coronary artery disease of native artery of native heart with stable angina pectoris (CMS/HCC) [I25.118]     Procedures  Median Sternotomy, Coronary Artery BYpass Grafting x 2 with Left Internal Thoracic Artery and Radial, posterior pericardiotomy  68079 - OR CABG W/ARTERIAL GRAFT TWO ARTERIAL GRAFTS      Surgeons      * Juan A Jimenez - Primary    Resident/Fellow/Other Assistant:  Surgeon(s) and Role:    Procedure Summary  Anesthesia: General  ASA: ASA status not filed in the log.  Anesthesia Staff: Anesthesiologist: Jaci Rubio MD  C-AA: EDMAR Billings  Perfusionist: Dominguez Iniguez  Estimated Blood Loss: 250 mL  Intra-op Medications:   Medication Name Total Dose   papaverine injection 480 mg   vancomycin (Vancocin) vial for injection 4 g   gelatin absorbable (Gelfoam) 100 sponge 1 each   heparin 1,000 unit/mL injection 47,000 Units   microfibril. collagen hemostat (Ultrafoam) sponge sponge 1 each   methadone (Dolophine) injection 20 mg 20 mg   ketamine (Ketalar) 500 mg in dextrose 5 % in water (D5W) 250 mL (2 mg/mL) infusion 89.28 mg              Anesthesia Record               Intraprocedure I/O Totals          Intake    Dexmedetomidine 0.00 mL    The total shown is the total volume documented since Anesthesia Start was filed.    Ketamine 0.00 mL    The total shown is the total volume documented since Anesthesia Start was filed.    .00 mL    electrolyte-A (Plasmalyte-A) 1000.00 mL    plasma-lyte-A IV solution 184.00 mL    Norepinephrine Drip 0.00 mL    The " total shown is the total volume documented since Anesthesia Start was filed.    Nitroglycerin Drip 0.00 mL    The total shown is the total volume documented since Anesthesia Start was filed.    Epinephrine Drip 0.00 mL    The total shown is the total volume documented since Anesthesia Start was filed.    Propofol Drip 0.00 mL    The total shown is the total volume documented since Anesthesia Start was filed.    Cell Saver 700 mL    Total Intake 2384 mL       Output    Urine 320 mL    Est. Blood Loss 250 mL    Total Output 570 mL       Net    Net Volume 1814 mL          Specimen: No specimens collected     Staff:   Circulator: Melissa Whitaker RN  Relief Circulator: Iftikhar Garces RN  Scrub Person: Tiffani Serna RN; Javier Cabrera         Drains and/or Catheters:   Chest Tube 1 Anterior Mediastinal (Active)   Output (mL) 4 mL 01/04/24 1606       Chest Tube 2 Mediastinal (Active)       Chest Tube 3 Pleural (Active)   Output (mL) 0 mL 01/04/24 1606       Urethral Catheter Non-latex;Temperature probe 14 Fr. (Active)   Output (mL) 30 mL 01/04/24 1606       Tourniquet Times:     Total Tourniquet Time Documented:  Arm - Upper (Left) - 25 minutes  Total: Arm - Upper (Left) - 25 minutes      Implants:     Findings: There were no pericardial adhesions or effusions.  The ascending aorta was soft without evidence of atherosclerosis.  The patient had severe and diffuse calcific coronary artery disease.  We were able to find a location on the mid LAD that was suitable for grafting.  The posterior descending artery was diffusely calcified.  Therefore we made a long coronary artery and grafted of the radial artery onto the posterior descending artery as a long anastomosis.    Indications: Samir Howell is an 68 y.o. male who presents with severe multivessel coronary artery disease the patient underwent a coronary calcium CT scan.  This was followed by coronary catheterization that demonstrated high-grade obstruction  of his left anterior descending, right coronary artery, and posterior descending artery.  The circumflex just had mild coronary artery disease.  Echocardiography demonstrated mild to moderate left ventricular dysfunction and no significant valvular abnormalities.  Because the patient had left ventricular dysfunction multivessel coronary artery disease including the LAD, he was referred for coronary artery bypass grafting.      Procedure Details: A median sternotomy was performed.  The left internal thoracic artery was dissected from the chest wall in a skeletonized manner and the left radial artery was harvested endoscopically from the left arm.  The thymus was divided and the pericardium was opened.  The ascending aorta was palpated and it was without evidence of atherosclerosis.  The patient was heparinized.  The ascending aorta and right atrium were cannulated for cardiopulmonary bypass and antegrade and retrograde cardioplegia cannulas were placed.  The patient was placed on cardiopulmonary bypass, the ascending aorta was crossclamped, and the heart was arrested and protected with cold blood cardioplegia.  During the remainder of the cross-clamp time cold blood cardioplegia was administered every 15 to 20 minutes.  The posterior descending artery was diffusely calcified.  We therefore performed along arteriotomy beginning at the origin of the posterior descending artery and extending into the mid posterior descending artery with there was no longer plaque.  We then performed a long anastomosis using the radial artery in end-to-side fashion.  This anastomosis was constructed with a running 7-0 Prolene suture and prior to completing the anastomoses it was probed and found to be widely patent.  The in situ left internal thoracic artery was used to bypass the LAD.  Similarly this anastomosis was constructed with a running 7-0 suture and prior to completing the anastomoses it was probed and found to be widely patent.   The proximal anastomoses of the radial artery graft was then constructed to the ascending aorta with a running 6-0 Prolene suture.  The heart and ascending aorta were de-aired and aortic cross-clamp was removed.  A posterior pericardiotomy was then performed extending from the inferior left pulmonary vein down to the diaphragm.  When the patient's heart was completely recovered and deaired, he was weaned from cardiopulmonary bypass without difficulty.  All cannulas were removed and the heparin effect was reversed with protamine.  We then evaluated the blood flow down the bypass grafts and it was found to be excellent.  Hemostasis was obtained, mediastinal and left pleural chest tubes were placed, right ventricular pacemaker wires were placed on the diaphragmatic surface of the RV, and the wounds were closed in the standard fashion.  The patient tolerated the procedure well and was brought to the intensive care unit in stable condition.  To sponge counts, instrument counts, and needle counts were reported correct by the circulating nurse there were no specimen sent to pathology.  The drains included mediastinal and left pleural chest tubes.  The estimated blood loss was 250 cc.  I performed the procedure without the resident.  Complications:  None; patient tolerated the procedure well.    Disposition: ICU - intubated and hemodynamically stable.  Condition: stable         Additional Details: None    Attending Attestation: I performed the procedure.    Juan A Jimenez  Phone Number: [phone]

## 2024-01-05 ENCOUNTER — APPOINTMENT (OUTPATIENT)
Dept: RADIOLOGY | Facility: HOSPITAL | Age: 69
DRG: 236 | End: 2024-01-05
Payer: COMMERCIAL

## 2024-01-05 LAB
ACT BLD: 122 SEC (ref 96–152)
ACT BLD: 132 SEC (ref 96–152)
ACT BLD: 468 SEC (ref 96–152)
ACT BLD: 489 SEC (ref 96–152)
ACT BLD: 508 SEC (ref 96–152)
ACT BLD: 521 SEC (ref 96–152)
ALBUMIN SERPL BCP-MCNC: 4 G/DL (ref 3.4–5)
ANION GAP SERPL CALC-SCNC: 12 MMOL/L (ref 10–20)
BUN SERPL-MCNC: 15 MG/DL (ref 6–23)
CA-I BLD-SCNC: 1.14 MMOL/L (ref 1.1–1.33)
CALCIUM SERPL-MCNC: 8.4 MG/DL (ref 8.6–10.6)
CHLORIDE SERPL-SCNC: 105 MMOL/L (ref 98–107)
CO2 SERPL-SCNC: 25 MMOL/L (ref 21–32)
CREAT SERPL-MCNC: 0.78 MG/DL (ref 0.5–1.3)
ERYTHROCYTE [DISTWIDTH] IN BLOOD BY AUTOMATED COUNT: 12.5 % (ref 11.5–14.5)
GFR SERPL CREATININE-BSD FRML MDRD: >90 ML/MIN/1.73M*2
GLUCOSE BLD MANUAL STRIP-MCNC: 105 MG/DL (ref 74–99)
GLUCOSE BLD MANUAL STRIP-MCNC: 109 MG/DL (ref 74–99)
GLUCOSE BLD MANUAL STRIP-MCNC: 116 MG/DL (ref 74–99)
GLUCOSE BLD MANUAL STRIP-MCNC: 122 MG/DL (ref 74–99)
GLUCOSE BLD MANUAL STRIP-MCNC: 122 MG/DL (ref 74–99)
GLUCOSE BLD MANUAL STRIP-MCNC: 123 MG/DL (ref 74–99)
GLUCOSE SERPL-MCNC: 131 MG/DL (ref 74–99)
HCT VFR BLD AUTO: 32.2 % (ref 41–52)
HGB BLD-MCNC: 10.6 G/DL (ref 13.5–17.5)
MAGNESIUM SERPL-MCNC: 2.21 MG/DL (ref 1.6–2.4)
MCH RBC QN AUTO: 28.6 PG (ref 26–34)
MCHC RBC AUTO-ENTMCNC: 32.9 G/DL (ref 32–36)
MCV RBC AUTO: 87 FL (ref 80–100)
NRBC BLD-RTO: 0 /100 WBCS (ref 0–0)
PHOSPHATE SERPL-MCNC: 2.9 MG/DL (ref 2.5–4.9)
PLATELET # BLD AUTO: 157 X10*3/UL (ref 150–450)
POTASSIUM SERPL-SCNC: 5.1 MMOL/L (ref 3.5–5.3)
RBC # BLD AUTO: 3.71 X10*6/UL (ref 4.5–5.9)
SODIUM SERPL-SCNC: 137 MMOL/L (ref 136–145)
WBC # BLD AUTO: 9.2 X10*3/UL (ref 4.4–11.3)

## 2024-01-05 PROCEDURE — 2500000001 HC RX 250 WO HCPCS SELF ADMINISTERED DRUGS (ALT 637 FOR MEDICARE OP): Performed by: NURSE PRACTITIONER

## 2024-01-05 PROCEDURE — 99231 SBSQ HOSP IP/OBS SF/LOW 25: CPT | Performed by: STUDENT IN AN ORGANIZED HEALTH CARE EDUCATION/TRAINING PROGRAM

## 2024-01-05 PROCEDURE — C9113 INJ PANTOPRAZOLE SODIUM, VIA: HCPCS

## 2024-01-05 PROCEDURE — 99233 SBSQ HOSP IP/OBS HIGH 50: CPT | Performed by: NURSE PRACTITIONER

## 2024-01-05 PROCEDURE — 83735 ASSAY OF MAGNESIUM: CPT

## 2024-01-05 PROCEDURE — 71045 X-RAY EXAM CHEST 1 VIEW: CPT

## 2024-01-05 PROCEDURE — 71045 X-RAY EXAM CHEST 1 VIEW: CPT | Performed by: RADIOLOGY

## 2024-01-05 PROCEDURE — 82947 ASSAY GLUCOSE BLOOD QUANT: CPT

## 2024-01-05 PROCEDURE — 97161 PT EVAL LOW COMPLEX 20 MIN: CPT | Mod: GP

## 2024-01-05 PROCEDURE — 2500000004 HC RX 250 GENERAL PHARMACY W/ HCPCS (ALT 636 FOR OP/ED): Performed by: NURSE PRACTITIONER

## 2024-01-05 PROCEDURE — 37799 UNLISTED PX VASCULAR SURGERY: CPT

## 2024-01-05 PROCEDURE — 85027 COMPLETE CBC AUTOMATED: CPT

## 2024-01-05 PROCEDURE — 1200000002 HC GENERAL ROOM WITH TELEMETRY DAILY

## 2024-01-05 PROCEDURE — 82330 ASSAY OF CALCIUM: CPT

## 2024-01-05 PROCEDURE — 2500000004 HC RX 250 GENERAL PHARMACY W/ HCPCS (ALT 636 FOR OP/ED)

## 2024-01-05 PROCEDURE — 2500000001 HC RX 250 WO HCPCS SELF ADMINISTERED DRUGS (ALT 637 FOR MEDICARE OP)

## 2024-01-05 PROCEDURE — 97165 OT EVAL LOW COMPLEX 30 MIN: CPT | Mod: GO

## 2024-01-05 PROCEDURE — 80069 RENAL FUNCTION PANEL: CPT

## 2024-01-05 PROCEDURE — 97530 THERAPEUTIC ACTIVITIES: CPT | Mod: GO

## 2024-01-05 PROCEDURE — 2500000005 HC RX 250 GENERAL PHARMACY W/O HCPCS: Performed by: NURSE PRACTITIONER

## 2024-01-05 RX ORDER — HYDROMORPHONE HYDROCHLORIDE 1 MG/ML
0.2 INJECTION, SOLUTION INTRAMUSCULAR; INTRAVENOUS; SUBCUTANEOUS EVERY 4 HOURS PRN
Status: DISCONTINUED | OUTPATIENT
Start: 2024-01-05 | End: 2024-01-05

## 2024-01-05 RX ORDER — INSULIN LISPRO 100 [IU]/ML
0-5 INJECTION, SOLUTION INTRAVENOUS; SUBCUTANEOUS
Status: DISCONTINUED | OUTPATIENT
Start: 2024-01-05 | End: 2024-01-08

## 2024-01-05 RX ORDER — IRON POLYSACCHARIDE COMPLEX 150 MG
150 CAPSULE ORAL DAILY
Status: DISCONTINUED | OUTPATIENT
Start: 2024-01-05 | End: 2024-01-09 | Stop reason: HOSPADM

## 2024-01-05 RX ORDER — POLYETHYLENE GLYCOL 3350 17 G/17G
17 POWDER, FOR SOLUTION ORAL 2 TIMES DAILY
Status: DISCONTINUED | OUTPATIENT
Start: 2024-01-05 | End: 2024-01-09 | Stop reason: HOSPADM

## 2024-01-05 RX ORDER — MULTIVIT-MIN/IRON FUM/FOLIC AC 7.5 MG-4
1 TABLET ORAL DAILY
Status: DISCONTINUED | OUTPATIENT
Start: 2024-01-05 | End: 2024-01-09 | Stop reason: HOSPADM

## 2024-01-05 RX ORDER — ACETAMINOPHEN 325 MG/1
650 TABLET ORAL EVERY 6 HOURS
Status: DISCONTINUED | OUTPATIENT
Start: 2024-01-05 | End: 2024-01-09 | Stop reason: HOSPADM

## 2024-01-05 RX ORDER — ISOSORBIDE DINITRATE 10 MG/1
10 TABLET ORAL
Status: DISCONTINUED | OUTPATIENT
Start: 2024-01-05 | End: 2024-01-05

## 2024-01-05 RX ORDER — HEPARIN SODIUM 5000 [USP'U]/ML
5000 INJECTION, SOLUTION INTRAVENOUS; SUBCUTANEOUS EVERY 8 HOURS
Status: DISCONTINUED | OUTPATIENT
Start: 2024-01-05 | End: 2024-01-09 | Stop reason: HOSPADM

## 2024-01-05 RX ORDER — METOPROLOL TARTRATE 25 MG/1
12.5 TABLET, FILM COATED ORAL 2 TIMES DAILY
Status: DISCONTINUED | OUTPATIENT
Start: 2024-01-05 | End: 2024-01-07

## 2024-01-05 RX ORDER — ATORVASTATIN CALCIUM 40 MG/1
40 TABLET, FILM COATED ORAL DAILY
Status: DISCONTINUED | OUTPATIENT
Start: 2024-01-05 | End: 2024-01-09 | Stop reason: HOSPADM

## 2024-01-05 RX ORDER — ISOSORBIDE MONONITRATE 30 MG/1
30 TABLET, EXTENDED RELEASE ORAL DAILY
Status: DISCONTINUED | OUTPATIENT
Start: 2024-01-05 | End: 2024-01-09 | Stop reason: HOSPADM

## 2024-01-05 RX ADMIN — ACETAMINOPHEN 650 MG: 325 TABLET ORAL at 10:55

## 2024-01-05 RX ADMIN — CEFAZOLIN SODIUM 2 G: 2 INJECTION, SOLUTION INTRAVENOUS at 12:50

## 2024-01-05 RX ADMIN — ACETAMINOPHEN 650 MG: 325 TABLET ORAL at 23:00

## 2024-01-05 RX ADMIN — HEPARIN SODIUM 5000 UNITS: 5000 INJECTION INTRAVENOUS; SUBCUTANEOUS at 08:45

## 2024-01-05 RX ADMIN — METOPROLOL TARTRATE 12.5 MG: 25 TABLET, FILM COATED ORAL at 08:46

## 2024-01-05 RX ADMIN — HEPARIN SODIUM 5000 UNITS: 5000 INJECTION INTRAVENOUS; SUBCUTANEOUS at 17:54

## 2024-01-05 RX ADMIN — Medication: at 12:00

## 2024-01-05 RX ADMIN — METOPROLOL TARTRATE 12.5 MG: 25 TABLET, FILM COATED ORAL at 21:13

## 2024-01-05 RX ADMIN — POLYSACCHARIDE-IRON COMPLEX 150 MG: 150 CAPSULE ORAL at 12:49

## 2024-01-05 RX ADMIN — SENNOSIDES AND DOCUSATE SODIUM 2 TABLET: 8.6; 5 TABLET ORAL at 21:14

## 2024-01-05 RX ADMIN — HYDROMORPHONE HYDROCHLORIDE 0.2 MG: 1 INJECTION, SOLUTION INTRAMUSCULAR; INTRAVENOUS; SUBCUTANEOUS at 06:23

## 2024-01-05 RX ADMIN — POLYETHYLENE GLYCOL 3350 17 G: 17 POWDER, FOR SOLUTION ORAL at 21:14

## 2024-01-05 RX ADMIN — HYDROMORPHONE HYDROCHLORIDE 0.2 MG: 1 INJECTION, SOLUTION INTRAMUSCULAR; INTRAVENOUS; SUBCUTANEOUS at 02:08

## 2024-01-05 RX ADMIN — SENNOSIDES AND DOCUSATE SODIUM 2 TABLET: 8.6; 5 TABLET ORAL at 08:01

## 2024-01-05 RX ADMIN — ISOSORBIDE MONONITRATE 30 MG: 30 TABLET, EXTENDED RELEASE ORAL at 12:49

## 2024-01-05 RX ADMIN — POLYETHYLENE GLYCOL 3350 17 G: 17 POWDER, FOR SOLUTION ORAL at 08:01

## 2024-01-05 RX ADMIN — Medication 1 TABLET: at 12:49

## 2024-01-05 RX ADMIN — CEFAZOLIN SODIUM 2 G: 2 INJECTION, SOLUTION INTRAVENOUS at 03:32

## 2024-01-05 RX ADMIN — OXYCODONE HYDROCHLORIDE 5 MG: 5 TABLET ORAL at 21:14

## 2024-01-05 RX ADMIN — ISOSORBIDE DINITRATE 10 MG: 10 TABLET ORAL at 08:45

## 2024-01-05 RX ADMIN — ACETAMINOPHEN 650 MG: 325 TABLET ORAL at 17:54

## 2024-01-05 RX ADMIN — OXYCODONE HYDROCHLORIDE 5 MG: 5 TABLET ORAL at 14:09

## 2024-01-05 RX ADMIN — PANTOPRAZOLE SODIUM 40 MG: 40 INJECTION, POWDER, FOR SOLUTION INTRAVENOUS at 06:23

## 2024-01-05 RX ADMIN — HYDROMORPHONE HYDROCHLORIDE 0.2 MG: 1 INJECTION, SOLUTION INTRAMUSCULAR; INTRAVENOUS; SUBCUTANEOUS at 08:00

## 2024-01-05 RX ADMIN — PSYLLIUM HUSK 1 PACKET: 3.4 POWDER ORAL at 09:00

## 2024-01-05 RX ADMIN — ASPIRIN 81 MG CHEWABLE TABLET 81 MG: 81 TABLET CHEWABLE at 08:01

## 2024-01-05 RX ADMIN — CEFAZOLIN SODIUM 2 G: 2 INJECTION, SOLUTION INTRAVENOUS at 21:12

## 2024-01-05 SDOH — SOCIAL STABILITY: SOCIAL INSECURITY: ABUSE: ADULT

## 2024-01-05 SDOH — SOCIAL STABILITY: SOCIAL INSECURITY: DO YOU FEEL UNSAFE GOING BACK TO THE PLACE WHERE YOU ARE LIVING?: NO

## 2024-01-05 SDOH — SOCIAL STABILITY: SOCIAL INSECURITY: ARE YOU OR HAVE YOU BEEN THREATENED OR ABUSED PHYSICALLY, EMOTIONALLY, OR SEXUALLY BY ANYONE?: NO

## 2024-01-05 SDOH — SOCIAL STABILITY: SOCIAL INSECURITY: ARE THERE ANY APPARENT SIGNS OF INJURIES/BEHAVIORS THAT COULD BE RELATED TO ABUSE/NEGLECT?: NO

## 2024-01-05 SDOH — SOCIAL STABILITY: SOCIAL INSECURITY: HAS ANYONE EVER THREATENED TO HURT YOUR FAMILY OR YOUR PETS?: NO

## 2024-01-05 SDOH — SOCIAL STABILITY: SOCIAL INSECURITY: DOES ANYONE TRY TO KEEP YOU FROM HAVING/CONTACTING OTHER FRIENDS OR DOING THINGS OUTSIDE YOUR HOME?: NO

## 2024-01-05 SDOH — SOCIAL STABILITY: SOCIAL INSECURITY: DO YOU FEEL ANYONE HAS EXPLOITED OR TAKEN ADVANTAGE OF YOU FINANCIALLY OR OF YOUR PERSONAL PROPERTY?: NO

## 2024-01-05 SDOH — SOCIAL STABILITY: SOCIAL INSECURITY: WERE YOU ABLE TO COMPLETE ALL THE BEHAVIORAL HEALTH SCREENINGS?: YES

## 2024-01-05 ASSESSMENT — COGNITIVE AND FUNCTIONAL STATUS - GENERAL
DRESSING REGULAR LOWER BODY CLOTHING: A LITTLE
DRESSING REGULAR LOWER BODY CLOTHING: A LITTLE
HELP NEEDED FOR BATHING: A LITTLE
MOVING TO AND FROM BED TO CHAIR: A LITTLE
TOILETING: A LITTLE
MOVING TO AND FROM BED TO CHAIR: A LITTLE
STANDING UP FROM CHAIR USING ARMS: A LITTLE
TOILETING: A LITTLE
DAILY ACTIVITIY SCORE: 24
DAILY ACTIVITIY SCORE: 20
CLIMB 3 TO 5 STEPS WITH RAILING: A LOT
PATIENT BASELINE BEDBOUND: NO
TURNING FROM BACK TO SIDE WHILE IN FLAT BAD: A LITTLE
MOBILITY SCORE: 24
WALKING IN HOSPITAL ROOM: A LITTLE
DAILY ACTIVITIY SCORE: 20
MOBILITY SCORE: 18
STANDING UP FROM CHAIR USING ARMS: A LITTLE
DRESSING REGULAR UPPER BODY CLOTHING: A LITTLE
HELP NEEDED FOR BATHING: A LITTLE
DRESSING REGULAR UPPER BODY CLOTHING: A LITTLE
CLIMB 3 TO 5 STEPS WITH RAILING: A LOT
MOBILITY SCORE: 18
WALKING IN HOSPITAL ROOM: A LITTLE
TURNING FROM BACK TO SIDE WHILE IN FLAT BAD: A LITTLE

## 2024-01-05 ASSESSMENT — ACTIVITIES OF DAILY LIVING (ADL)
WALKS IN HOME: NEEDS ASSISTANCE
JUDGMENT_ADEQUATE_SAFELY_COMPLETE_DAILY_ACTIVITIES: YES
ADL_ASSISTANCE: INDEPENDENT
HEARING - RIGHT EAR: FUNCTIONAL
GROOMING: NEEDS ASSISTANCE
PATIENT'S MEMORY ADEQUATE TO SAFELY COMPLETE DAILY ACTIVITIES?: YES
DRESSING YOURSELF: NEEDS ASSISTANCE
HEARING - LEFT EAR: FUNCTIONAL
FEEDING YOURSELF: NEEDS ASSISTANCE
TOILETING: NEEDS ASSISTANCE
ADEQUATE_TO_COMPLETE_ADL: YES
BATHING: NEEDS ASSISTANCE
ADL_ASSISTANCE: INDEPENDENT

## 2024-01-05 ASSESSMENT — PAIN SCALES - GENERAL
PAINLEVEL_OUTOF10: 3
PAINLEVEL_OUTOF10: 3
PAINLEVEL_OUTOF10: 6
PAINLEVEL_OUTOF10: 2
PAINLEVEL_OUTOF10: 5 - MODERATE PAIN

## 2024-01-05 ASSESSMENT — PAIN DESCRIPTION - DESCRIPTORS
DESCRIPTORS: ACHING;SORE
DESCRIPTORS: SORE
DESCRIPTORS: SORE

## 2024-01-05 ASSESSMENT — PAIN - FUNCTIONAL ASSESSMENT
PAIN_FUNCTIONAL_ASSESSMENT: 0-10

## 2024-01-05 NOTE — PROGRESS NOTES
Acute Pain Service    Postop Pain HPI -   Palliative: relieved with IV analgesics and regional local anesthetics  Provocative: movement  Quality:  burning and aching  Radiation:  none  Severity:  2/10  Timing: constant    24-HOUR OPIOID CONSUMPTION:  Methadone 20 mg  Dilaudid 0.4 mg    Scheduled medications  acetaminophen, 650 mg, oral, q6h  aspirin, 81 mg, oral, Daily  atorvastatin, 40 mg, oral, Daily  ceFAZolin, 2 g, intravenous, q8h  heparin (porcine), 5,000 Units, subcutaneous, q8h  insulin lispro, 0-5 Units, subcutaneous, TID with meals  iron polysaccharides, 150 mg, oral, Daily  isosorbide mononitrate ER, 30 mg, oral, Daily  metoprolol tartrate, 12.5 mg, oral, BID  multivitamin with minerals, 1 tablet, oral, Daily  oxygen, , inhalation, Continuous - 02/gases  polyethylene glycol, 17 g, oral, BID  sennosides-docusate sodium, 2 tablet, oral, BID      Continuous medications     PRN medications  PRN medications: dextrose **OR** glucagon, oxyCODONE, oxygen     Physical Exam:  Constitutional:  no distress, alert and cooperative  Eyes: clear sclera  Head/Neck: No apparent injury, trachea midline  Respiratory/Thorax: Patent airways, thorax symmetric, breathing comfortably  Cardiovascular: no pitting edema  Gastrointestinal: Nondistended  Musculoskeletal: ROM intact  Extremities: no clubbing  Neurological: alert, stokes x4  Psychological: Appropriate affect    Results for orders placed or performed during the hospital encounter of 01/04/24 (from the past 24 hour(s))   Calcium, Ionized   Result Value Ref Range    POCT Calcium, Ionized 1.06 (L) 1.1 - 1.33 mmol/L   Magnesium   Result Value Ref Range    Magnesium 2.63 (H) 1.60 - 2.40 mg/dL   Coagulation Screen   Result Value Ref Range    Protime 14.8 (H) 9.8 - 12.8 seconds    INR 1.3 (H) 0.9 - 1.1    aPTT 24 (L) 27 - 38 seconds   Fibrinogen   Result Value Ref Range    Fibrinogen 184 (L) 200 - 400 mg/dL   CBC   Result Value Ref Range    WBC 16.8 (H) 4.4 - 11.3 x10*3/uL    nRBC  0.0 0.0 - 0.0 /100 WBCs    RBC 3.77 (L) 4.50 - 5.90 x10*6/uL    Hemoglobin 10.8 (L) 13.5 - 17.5 g/dL    Hematocrit 32.6 (L) 41.0 - 52.0 %    MCV 87 80 - 100 fL    MCH 28.6 26.0 - 34.0 pg    MCHC 33.1 32.0 - 36.0 g/dL    RDW 12.4 11.5 - 14.5 %    Platelets 192 150 - 450 x10*3/uL   Renal Function Panel   Result Value Ref Range    Glucose 174 (H) 74 - 99 mg/dL    Sodium 142 136 - 145 mmol/L    Potassium 4.3 3.5 - 5.3 mmol/L    Chloride 110 (H) 98 - 107 mmol/L    Bicarbonate 20 (L) 21 - 32 mmol/L    Anion Gap 16 10 - 20 mmol/L    Urea Nitrogen 16 6 - 23 mg/dL    Creatinine 0.99 0.50 - 1.30 mg/dL    eGFR 84 >60 mL/min/1.73m*2    Calcium 8.2 (L) 8.6 - 10.6 mg/dL    Phosphorus 3.3 2.5 - 4.9 mg/dL    Albumin 3.8 3.4 - 5.0 g/dL   Type and screen   Result Value Ref Range    ABO TYPE A     Rh TYPE POS     ANTIBODY SCREEN NEG    Blood Gas Arterial Full Panel   Result Value Ref Range    POCT pH, Arterial 7.41 7.38 - 7.42 pH    POCT pCO2, Arterial 32 (L) 38 - 42 mm Hg    POCT pO2, Arterial 121 (H) 85 - 95 mm Hg    POCT SO2, Arterial 100 94 - 100 %    POCT Oxy Hemoglobin, Arterial 97.7 94.0 - 98.0 %    POCT Hematocrit Calculated, Arterial 33.0 (L) 41.0 - 52.0 %    POCT Sodium, Arterial 137 136 - 145 mmol/L    POCT Potassium, Arterial 5.2 3.5 - 5.3 mmol/L    POCT Chloride, Arterial 109 (H) 98 - 107 mmol/L    POCT Ionized Calcium, Arterial 1.11 1.10 - 1.33 mmol/L    POCT Glucose, Arterial 152 (H) 74 - 99 mg/dL    POCT Lactate, Arterial 1.4 0.4 - 2.0 mmol/L    POCT Base Excess, Arterial -3.6 (L) -2.0 - 3.0 mmol/L    POCT HCO3 Calculated, Arterial 20.3 (L) 22.0 - 26.0 mmol/L    POCT Hemoglobin, Arterial 11.0 (L) 13.5 - 17.5 g/dL    POCT Anion Gap, Arterial 13 10 - 25 mmo/L    Patient Temperature 37.0 degrees Celsius    FiO2 40 %   POCT GLUCOSE   Result Value Ref Range    POCT Glucose 128 (H) 74 - 99 mg/dL   Blood Gas Arterial Full Panel   Result Value Ref Range    POCT pH, Arterial 7.37 (L) 7.38 - 7.42 pH    POCT pCO2, Arterial 38 38  - 42 mm Hg    POCT pO2, Arterial 161 (H) 85 - 95 mm Hg    POCT SO2, Arterial 99 94 - 100 %    POCT Oxy Hemoglobin, Arterial 97.1 94.0 - 98.0 %    POCT Hematocrit Calculated, Arterial 32.0 (L) 41.0 - 52.0 %    POCT Sodium, Arterial 136 136 - 145 mmol/L    POCT Potassium, Arterial 4.7 3.5 - 5.3 mmol/L    POCT Chloride, Arterial 108 (H) 98 - 107 mmol/L    POCT Ionized Calcium, Arterial 1.15 1.10 - 1.33 mmol/L    POCT Glucose, Arterial 161 (H) 74 - 99 mg/dL    POCT Lactate, Arterial 1.0 0.4 - 2.0 mmol/L    POCT Base Excess, Arterial -3.0 (L) -2.0 - 3.0 mmol/L    POCT HCO3 Calculated, Arterial 22.0 22.0 - 26.0 mmol/L    POCT Hemoglobin, Arterial 10.6 (L) 13.5 - 17.5 g/dL    POCT Anion Gap, Arterial 11 10 - 25 mmo/L    Patient Temperature 37.0 degrees Celsius    FiO2 40 %   POCT GLUCOSE   Result Value Ref Range    POCT Glucose 122 (H) 74 - 99 mg/dL   Calcium, Ionized   Result Value Ref Range    POCT Calcium, Ionized 1.14 1.1 - 1.33 mmol/L   Magnesium   Result Value Ref Range    Magnesium 2.21 1.60 - 2.40 mg/dL   CBC   Result Value Ref Range    WBC 9.2 4.4 - 11.3 x10*3/uL    nRBC 0.0 0.0 - 0.0 /100 WBCs    RBC 3.71 (L) 4.50 - 5.90 x10*6/uL    Hemoglobin 10.6 (L) 13.5 - 17.5 g/dL    Hematocrit 32.2 (L) 41.0 - 52.0 %    MCV 87 80 - 100 fL    MCH 28.6 26.0 - 34.0 pg    MCHC 32.9 32.0 - 36.0 g/dL    RDW 12.5 11.5 - 14.5 %    Platelets 157 150 - 450 x10*3/uL   Renal Function Panel   Result Value Ref Range    Glucose 131 (H) 74 - 99 mg/dL    Sodium 137 136 - 145 mmol/L    Potassium 5.1 3.5 - 5.3 mmol/L    Chloride 105 98 - 107 mmol/L    Bicarbonate 25 21 - 32 mmol/L    Anion Gap 12 10 - 20 mmol/L    Urea Nitrogen 15 6 - 23 mg/dL    Creatinine 0.78 0.50 - 1.30 mg/dL    eGFR >90 >60 mL/min/1.73m*2    Calcium 8.4 (L) 8.6 - 10.6 mg/dL    Phosphorus 2.9 2.5 - 4.9 mg/dL    Albumin 4.0 3.4 - 5.0 g/dL   POCT GLUCOSE   Result Value Ref Range    POCT Glucose 122 (H) 74 - 99 mg/dL   POCT GLUCOSE   Result Value Ref Range    POCT Glucose 123  (H) 74 - 99 mg/dL      Jones Howell is a 68 y.o. year old male patient who presents for CABGx2 with Dr. Camilo. Acute Pain consulted for block for postoperative pain control.     Plan:     - Bilateral SAP blocks with catheters and single shot subcostal nerve blocks performed postoperatively on 1/4  - Ambit ball with Ropivacaine 0.2%/NaCl 0.9% 500mL, Rate 7 cc/hr bilaterally  - Ambit medication will not interfere with pain medication prescribed by the primary team.   - Please be aware of local anesthetic toxic dose and absorption variability before considering lidocaine patches  - Acute pain service will follow while catheters in place  - Rest of pain management per primary team     Acute Pain Resident  pg [phone] ph [phone]

## 2024-01-05 NOTE — PROGRESS NOTES
Transitional Care Coordinator   Met with patient and introduced myself as Care Coordinator and member of the discharge planning team.  Patient is s/p CABG x2. He plans to return home with his wife at time of discharge. Discussed home care needs and options. Patient would like to talk to his wife whe she returns. Will continue to follow for home going needs.  Camila Silverio RN

## 2024-01-05 NOTE — SIGNIFICANT EVENT
01/04/24 2044   Daily Screen   Total RSBI 29   Weaning Parameters   Weaning Vital Capacity 910 mL   Negative Inspiratory Force (NIF) -38   Spontaneous Minute Volume (MV) 8.6   Weaning Tidal Volume 518 mL   Respiratory Depth/Rhythm Regular   Respiratory Effort Unlabored

## 2024-01-05 NOTE — PROGRESS NOTES
CTICU Progress Note  Jones Howell/[id number]    Admit Date: 1/4/2024  Hospital Length of Stay: 1   ICU Length of Stay: 18h   CT SURGEON:     SUBJECTIVE:   OOB to chair  RA    MEDICATIONS  Infusions:  dexmedeTOMIDine, Last Rate: Stopped (01/04/24 1750)  lactated Ringer's, Last Rate: 30 mL/hr (01/04/24 2200)  lactated Ringer's, Last Rate: 5 mL/hr (01/04/24 2200)  nitroglycerin, Last Rate: 5 mcg/min (01/05/24 0600)      Scheduled:  acetaminophen, 650 mg, q4h   Or  acetaminophen, 650 mg, q4h  aspirin, 81 mg, Daily   Or  aspirin, 81 mg, Daily   Or  aspirin, 150 mg, Daily  ceFAZolin, 2 g, q8h  insulin lispro, 0-15 Units, q4h  pantoprazole, 40 mg, Daily before breakfast   Or  pantoprazole, 40 mg, Daily before breakfast  polyethylene glycol, 17 g, Daily  psyllium, 1 packet, Daily  sennosides-docusate sodium, 2 tablet, BID      PRN:  calcium gluconate, 1 g, q6h PRN  calcium gluconate, 2 g, q6h PRN  dextrose, 25 g, q15 min PRN   Or  glucagon, 1 mg, q15 min PRN  HYDROmorphone, 0.2 mg, q15 min PRN  magnesium sulfate, 1 g, q6h PRN  magnesium sulfate, 2 g, q6h PRN  naloxone, 0.2 mg, q5 min PRN  ondansetron, 4 mg, q8h PRN   Or  ondansetron, 4 mg, q8h PRN  oxyCODONE, 5 mg, q4h PRN  oxygen, , Continuous PRN - O2/gases  potassium chloride CR, 20 mEq, q6h PRN   Or  potassium chloride, 20 mEq, q6h PRN  potassium chloride, 40 mEq, q6h PRN        PHYSICAL EXAM:   Visit Vitals  /77   Pulse 73   Temp 37.2 °C (99 °F)   Resp 16   Ht 1.829 m (6')   Wt 93 kg (205 lb 0.4 oz)   SpO2 96%   BMI 27.81 kg/m²   Smoking Status Never   BSA 2.17 m²     Wt Readings from Last 5 Encounters:   01/04/24 93 kg (205 lb 0.4 oz)   12/22/23 93 kg (205 lb)   12/18/23 93 kg (205 lb)   12/07/23 95.3 kg (210 lb)   11/13/23 94.3 kg (208 lb)     INTAKE/OUTPUT:  I/O last 3 completed shifts:  In: 5406.7 (58.1 mL/kg) [I.V.:822.7 (8.8 mL/kg); Blood:700; IV Piggyback:3884]  Out: 2663 (28.6 mL/kg) [Urine:1803 (0.5 mL/kg/hr); Blood:250; Chest Tube:610]  Weight: 93 kg         LDA:  CVC 01/04/24 Double lumen Right Internal jugular (Active)   Placement Date/Time: 01/04/24 (c) 0755   Hand Hygiene Performed Prior to CVC Insertion: Yes  Site Prep: Chlorhexidine   Site Prep Agent has Completely Dried Before Insertion: Yes  All 5 Sterile Barriers Used (Gloves, Gown, Cap, Mask, Large Sterile Bri...   Number of days: 1       Arterial Line 01/04/24 Right Brachial (Active)   Placement Date/Time: 01/04/24 (c) 0740   Size: 20 G  Orientation: Right  Location: Brachial  Securement Method: Transparent dressing  Patient Tolerance: Tolerated well   Number of days: 1       Urethral Catheter Non-latex;Temperature probe 14 Fr. (Active)   Placement Date/Time: 01/04/24 0750   Earliest Known Present: 01/04/24  Placed by: Jos Hopson  Hand Hygiene Completed: Yes  Catheter Type: Non-latex;Temperature probe  Tube Size (Fr.): 14 Fr.  Catheter Balloon Size: 10 mL  Urine Returned: Yes   Number of days: 1       Chest Tube 1 Anterior Mediastinal (Active)   Placement Date/Time: 01/04/24 (c) 1422   Earliest Known Present: 01/04/24  Tube Number: 1  Chest Tube Orientation: Anterior  Chest Tube Location: Mediastinal   Number of days: 0       Chest Tube 2 Mediastinal (Active)   Placement Date/Time: 01/04/24 (c) 1423   Tube Number: 2  Chest Tube Location: Mediastinal   Number of days: 0       Chest Tube 3 Pleural (Active)   Placement Date/Time: 01/04/24 (c) 1424   Tube Number: 3  Chest Tube Location: Pleural   Number of days: 0       Physical Exam:   - CONSTITUTION: younger appearing robust male in no distress in chair  - NEUROLOGIC: neuro intact  - CARDIOVASCULAR: NSR.  V wires now backup VVI @ 50  - RESPIRATORY: clear bilaterally.  No airleak from chest tubes, serosang output  - GI: soft.  Hypoactive BS  - : acosta with clear yellow urine  - EXTREMITIES: warm, well perfused.    - SKIN: no breakdown  - PSYCHIATRIC: appropriate      Images:     Invasive Hemodynamics:    Most Recent Range Past 24hrs   BP (Art) 120/63  Arterial Line BP 1  Min: 89/19  Max: 135/64   MAP(Art) 82 mmHg Arterial Line MAP 1 (mmHg)   Min: 48 mmHg  Max: 92 mmHg   RA/CVP   No data recorded   PA   No data recorded   PA(mean)   No data recorded   CO   No data recorded   CI   No data recorded   Mixed Venous   No data recorded   SVR    No data recorded       Assessment/Plan   Assessment:  This is a 68 year old male with past medical history of cataracts, celiac disease, CAD, HLD, lymphocytic colitis, GERD, and sciatica. He arrives to the CTICU s/p CABG x 2 (LIMA->LAD, RA->PDA) and posterior pericardial window.      Plan:  NEURO: History of sciatica with prior lumbar spinal surgery. Neuro intact.  OOB to chair overnight and walked this morning.   Acceptable pain control. . -->  - Serial neuro and pain assessments   - Scheduled Tylenol   - PRN oxycodone  - PRN dilaudid for pain, decrease to q4h prn  - PT Consult, OOB to chair as tolerated and walk daily  - CAM ICU score qshift  - Sleep/wake cycle hygiene     CV: Patient has a history of CAD and HLD. Is now status post CABG x 2 (LIMA->LAD, RA->PDA) and posterior pericardial window. Pre/Post EF: RV normal, LV 35-40%/RV normal, LV 50%. NSR and normotensive on low dose nitroglycerine for radial graft.  V epicardial wires backup.  Now sensing and capturing appropriately.  -->  - Maintain goal MAP 70-90  - start metop 12.5mg BID  - start isosorbide 10mg TID and dc nitroglycerine  - V epicardial wires as backup  - continue aspirin   - continue atorvastatin   - Hold home losartan and rosuvastatin     PULM: No history of pulmonary disease. Extubated and on RA. 2 mediastinal chest tubes and 1 left pleural chest tube, no airleak but not meeting criteria.   - dc CXR  - Goal SpO2 >92%.   - IS q1h and OOB to chair   - Chest tubes to wall suction, will dc when criteria met     GI: History of celiac disease, lymphocytic colitis, and GERD. Passed swallow eval. -->  - dc PPI. Not on at home.   - start gluten free diet  -  Colace/senna BID and miralax BID  - Hold home vitamin D and vitamin B     : CSA-FARTUN Risk Score low. No history of renal disease, baseline creatinine 0.99. Creatinine stable post-op. Acosta in place and making adequate UOP.  Appears euvolemic.  -->  - dc acosta  - Goal UOP 0.5ml/kg/hr  - RFP as clinically indicated  - Replete electrolytes per CTICU protocol     ENDO: No history of diabetes. A1c: 5.3. -->  - Maintain BG <180, insulin per CTICU protocol     HEME: Acute blood loss anemia stable. -->    - Monitor drain output volume and characteristics  - CBC in AM  - continue aspirin  - start SQH  - SCDs for DVT prophylaxis.  - Last type and screen: 1/4     ID: Afebrile, no current indications of infection. MRSA negative. -->  - Trend temp q4h  - Periop cefazolin x 48hrs     Skin: No active skin issues.  - Preventative Mepilex dressings in place on sacrum and heels  - Change preventative Mepilex weekly or more frequently as indicated (when moist/soiled)   - Every shift skin assessment per nursing and weekly ICU skin rounds  - Moisture barrier to be applied with geena care  - Active skin problems addressed with nursing on daily rounds     Proph:  SCDs  SQH     G:  Line  Right IJ MAC w Minimac placed 1/4/24, dc today  Right brachial a-line placed 1/4/24, dc today  Acosta placed 1/4/24, dc today  PIVs    F: Family: wife and daughter updated     A,B,C,D,E,F,G: reviewed     I personally spent 60 minutes of time directly and personally managing the patient exclusive of separately billable procedures.     Dispo: Transfer to floor.   CTICU TEAM PHONE [phone]

## 2024-01-05 NOTE — PROGRESS NOTES
"Physical Therapy    Physical Therapy Evaluation    Patient Name: Jones Howell \"Samir\"  MRN: [id number]  Today's Date: 1/5/2024   Time Calculation  Start Time: 0901  Stop Time: 0926  Time Calculation (min): 25 min    Assessment/Plan   PT Assessment  PT Assessment Results: Decreased mobility, Decreased endurance  Rehab Prognosis: Excellent  Evaluation/Treatment Tolerance: Patient tolerated treatment well  Medical Staff Made Aware: Yes  End of Session Communication: Bedside nurse  Assessment Comment: Pt tolerated PT evaluation well; pt demonstrated slightly decreased endurance yet maintained appropriate vitals during ambulation. Pt will continue to benefit from skilled PT to improve functional mobility.  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 (Lines: A line, 2 chest tubes, pain block pack, central line)   Home Living:  Home Living  Type of Home: House  Lives With: Spouse  Home Adaptive " Equipment:  (Owns rolling walker, did not use prior to admission)  Home Layout: Two level  Home Access: Stairs to enter with rails  Entrance Stairs-Number of Steps: 2  Bathroom Shower/Tub: Walk-in shower  Bathroom Toilet: Standard  Bathroom Equipment: None  Bathroom Accessibility: Half bath located on first floor  Home Living Comments: Pt's wife reported that a first floor setup is available if necessary  Prior Level of Function:  Prior Function Per Pt/Caregiver Report  Level of [place]: Independent with ADLs and functional transfers  ADL Assistance: Independent  Homemaking Assistance: Independent  Ambulatory Assistance: Independent  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 (MAP goal 70-90)  Vital Signs:  Vital Signs  Heart Rate: 74 (76)  Heart Rate Source: Monitor  SpO2: 98 % (98)  BP: 139/69 (140/87)  MAP (mmHg): 92 (89)  BP Location: Right arm  BP Method: Arterial line  Patient Position: Sitting    Objective   Lines/Tubes/Drains:  Chest Tube 1 Anterior Mediastinal (Active)   Number of days: 0       Chest Tube 2 Mediastinal (Active)   Number of days: 0       Chest Tube 3 Pleural (Active)   Number of days: 0     Continuous Medications/Drips:  lactated Ringer's, 5 mL/hr, Last Rate: 5 mL/hr (01/05/24 0800)      Pain:  Pain Assessment  Pain Assessment: 0-10  Pain Score: 2  Pain Type: Surgical pain  Pain Location: Chest  Pain Descriptors: Sore  Pain Interventions: Ambulation/increased activity  Cognition:  Cognition  Overall Cognitive Status: Within Functional Limits  Orientation Level: Oriented X4    Extremity/Trunk  Assessments:  Strength:  Strength Comments: Not assessed formally, grossly at least 3/5 based on functional mobility  RUE   RUE : Within Functional Limits  LUE   LUE: Within Functional Limits  RLE   RLE : Within Functional Limits  LLE   LLE : Within Functional Limits    General Assessments:  Strength  Strength Comments: Not assessed formally, grossly at least 3/5 based on functional mobility  General Observation  General Observation: Increased time spent for advacned ICU line management and education of post-surgical precautions.   Activity Tolerance  Endurance: Tolerates less than 10 min exercise, no significant change in vital signs  Early Mobility/Exercise Safety Screen: Proceed with mobilization - No exclusion criteria met     Coordination  Movements are Fluid and Coordinated: Yes  Static Sitting Balance  Static Sitting-Balance Support: Feet supported, Right upper extremity supported, Left upper extremity supported  Static Sitting-Level of Assistance: Independent  Static Sitting-Comment/Number of Minutes: 15  Static Standing Balance  Static Standing-Balance Support: Bilateral upper extremity supported  Static Standing-Level of Assistance: Contact guard  Static Standing-Comment/Number of Minutes: 2    Functional Assessments:  Bed Mobility  Bed Mobility: No  Transfers  Transfer: Yes  Transfer 1  Transfer From 1: Sit to  Transfer to 1: Stand  Technique 1: Sit to stand  Transfer Device 1: Walker  Transfer Level of Assistance 1: Contact guard  Trials/Comments 1: Verbal cues to place one UE on chair prior to performing sit to stand with walker, CGA provided for safety and management of lines.  Transfers 2  Transfer From 2: Stand to  Transfer to 2: Sit  Technique 2: Stand to sit  Transfer Device 2: Walker  Transfer Level of Assistance 2: Contact guard  Trials/Comments 2: Pt demonstrated appropriate eccentric control when performing stand to sit to arm chair; CGA provided for safety and management of  lines.  Ambulation/Gait Training  Ambulation/Gait Training Performed: Yes  Ambulation/Gait Training 1  Surface 1: Level tile  Device 1: Rolling walker  Assistance 1: Contact guard  Quality of Gait 1:  (Decreased gait speed)  Comments/Distance (ft) 1: 300'  Stairs  Stairs: No     Outcome Measures:  [facility] Basic Mobility  Turning from your back to your side while in a flat bed without using bedrails: None  Moving from lying on your back to sitting on the side of a flat bed without using bedrails: A little  Moving to and from bed to chair (including a wheelchair): A little  Standing up from a chair using your arms (e.g. wheelchair or bedside chair): A little  To walk in hospital room: A little  Climbing 3-5 steps with railing: A lot  Basic Mobility - Total Score: 18     FSS-ICU  Ambulation: Walks >/ or equal to 150 feet with minimal assistance x1  Rolling: Modified independence, requires use of assistive device  Sitting: Modified independence, requires use of assistive device  Transfer Sit-to-Stand: Minimal assistance (performs 75% or more of task)  Transfer Supine-to-Sit: Minimal assistance (performs 75% or more of task)  Total Score: 24  ICU Mobility Screen  Early Mobility/Exercise Safety Screen: Proceed with mobilization - No exclusion criteria met     Encounter Problems       Encounter Problems (Active)       Balance       Pt will demonstrated ability to score at least 24/28 on the Tinetti balance assessment tool to ensure safety upon D/C.  (Progressing)       Start:  01/05/24    Expected End:  01/19/24                     Mobility       Pt will demonstrated ability to ambulate 600' with proper form and no balance deficits for safe home going.   (Progressing)       Start:  01/05/24    Expected End:  01/19/24            Pt will be able to tolerate >15 minutes of standing activity continuously without seated rest break with stable vitals and RPD </=3/10 and RPE </=13/20 for improved functional mobility  (Progressing)        Start:  01/05/24    Expected End:  01/19/24               Transfers       LTG - Patient will demonstrate safe transfer techniques (Progressing)       Start:  01/05/24    Expected End:  01/19/24                   Education Documentation  Handouts, taught by Adore Powell PT at 1/5/2024 11:28 AM.  Learner: Family, Patient  Readiness: Acceptance  Method: Explanation, Demonstration, Handout  Response: Verbalizes Understanding, Demonstrated Understanding    Precautions, taught by Adore Powell PT at 1/5/2024 11:28 AM.  Learner: Family, Patient  Readiness: Acceptance  Method: Explanation, Demonstration, Handout  Response: Verbalizes Understanding, Demonstrated Understanding    Body Mechanics, taught by Adore Powell PT at 1/5/2024 11:28 AM.  Learner: Family, Patient  Readiness: Acceptance  Method: Explanation, Demonstration, Handout  Response: Verbalizes Understanding, Demonstrated Understanding    Mobility Training, taught by Adore Powell PT at 1/5/2024 11:28 AM.  Learner: Family, Patient  Readiness: Acceptance  Method: Explanation, Demonstration, Handout  Response: Verbalizes Understanding, Demonstrated Understanding    Education Comments  No comments found.        01/05/24 at 11:34 AM   Adore Powell PT   Rehab Office: [phone]

## 2024-01-05 NOTE — PROGRESS NOTES
Physical Therapy    Physical Therapy Evaluation    Patient Name: Samir Howell  MRN: [id number]  Today's Date: 1/5/2024     Assessment/Plan   PT Assessment  PT Assessment Results: Decreased mobility, Decreased endurance  Rehab Prognosis: Excellent  Evaluation/Treatment Tolerance: Patient tolerated treatment well  Medical Staff Made Aware: Yes  End of Session Communication: Bedside nurse  Assessment Comment: Pt tolerated PT evaluation well; pt demonstrated slightly decreased endurance yet maintained appropriate vitals during ambulation. Pt will continue to benefit from skilled PT to improve functional mobility.  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 (Lines: A line, 2 chest tubes, pain block pack, central line)  Home Living:  Home Living  Type of Home: House  Lives With: Spouse  Home Adaptive Equipment:  (Owns rolling walker, did not use  prior to admission)  Home Layout: Two level  Home Access: Stairs to enter with rails  Entrance Stairs-Number of Steps: 2  Bathroom Shower/Tub: Walk-in shower  Bathroom Toilet: Standard  Bathroom Equipment: None  Bathroom Accessibility: Half bath located on first floor  Home Living Comments: Pt's wife reported that a first floor setup is available if necessary  Prior Level of Function:  Prior Function Per Pt/Caregiver Report  Level of [place]: Independent with ADLs and functional transfers  ADL Assistance: Independent  Homemaking Assistance: Independent  Ambulatory Assistance: Independent  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 (MAP goal 70-90) (MAP goal 70-90)  Vital Signs:  Vital Signs  Heart Rate: 74 (76)  Heart Rate Source: Monitor  SpO2: 98 % (98)  BP: 139/69 (140/87)  MAP (mmHg): 92 (89)  BP Location: Right arm  BP Method: Arterial line  Patient Position: Sitting    Objective   Pain:  Pain Assessment  Pain Assessment: 0-10  Pain Score: 2  Pain Type: Surgical pain  Pain Location: Chest  Pain Descriptors: Sore  Pain Interventions: Ambulation/increased activity  Cognition:  Orientation Level: Oriented X4    General Assessments:    General Observation: Increased time spent for advacned ICU line management and education of post-surgical precautions.     Activity Tolerance  Endurance: Tolerates less than 10 min exercise, no significant change in vital signs  Early Mobility/Exercise Safety Screen: Proceed with mobilization - No exclusion criteria met       Strength  Strength Comments: Not assessed formally, grossly at least 3/5  based on functional mobility     Coordination  Movements are Fluid and Coordinated: Yes    Postural Control  Postural Control: Within Functional Limits    Static Sitting Balance  Static Sitting-Balance Support: Feet supported, Right upper extremity supported, Left upper extremity supported  Static Sitting-Level of Assistance: Independent  Static Sitting-Comment/Number of Minutes: 15    Static Standing Balance  Static Standing-Balance Support: Bilateral upper extremity supported  Static Standing-Level of Assistance: Contact guard  Static Standing-Comment/Number of Minutes: 2  Functional Assessments:     Bed Mobility  Bed Mobility: No    Transfers  Transfer: Yes  Transfer 1  Transfer From 1: Sit to  Transfer to 1: Stand  Technique 1: Sit to stand  Transfer Device 1: Walker  Transfer Level of Assistance 1: Contact guard  Trials/Comments 1: Verbal cues to place one UE on chair prior to performing sit to stand with walker, CGA provided for safety and management of lines.  Transfers 2  Transfer From 2: Stand to  Transfer to 2: Sit  Technique 2: Stand to sit  Transfer Device 2: Walker  Transfer Level of Assistance 2: Contact guard  Trials/Comments 2: Pt demonstrated appropriate eccentric control when performing stand to sit to arm chair; CGA provided for safety and management of lines.    Ambulation/Gait Training  Ambulation/Gait Training Performed: Yes  Ambulation/Gait Training 1  Surface 1: Level tile  Device 1: Rolling walker  Assistance 1: Contact guard  Quality of Gait 1:  (Decreased gait speed)  Comments/Distance (ft) 1: 300'    Stairs  Stairs: No     Extremity/Trunk Assessments:  RUE : Within Functional Limits  LUE: Within Functional Limits  RLE : Within Functional Limits   LLE : Within Functional Limits    Outcome Measures:  [facility] Basic Mobility  Turning from your back to your side while in a flat bed without using bedrails: None  Moving from lying on your back to sitting on the side of a flat bed without using  bedrails: A little  Moving to and from bed to chair (including a wheelchair): A little  Standing up from a chair using your arms (e.g. wheelchair or bedside chair): A little  To walk in hospital room: A little  Climbing 3-5 steps with railing: A lot  Basic Mobility - Total Score: 18    FSS-ICU  Ambulation: Walks >/ or equal to 150 feet with minimal assistance x1  Rolling: Modified independence, requires use of assistive device  Sitting: Modified independence, requires use of assistive device  Transfer Sit-to-Stand: Minimal assistance (performs 75% or more of task)  Transfer Supine-to-Sit: Minimal assistance (performs 75% or more of task)  Total Score: 24    Encounter Problems       Encounter Problems (Active)       Balance       Pt will demonstrated ability to score at least 24/28 on the Tinetti balance assessment tool to ensure safety upon D/C.  (Progressing)       Start:  01/05/24    Expected End:  01/19/24                     Mobility       Pt will demonstrated ability to ambulate 600' with proper form and no balance deficits for safe home going.   (Progressing)       Start:  01/05/24    Expected End:  01/19/24            Pt will be able to tolerate >15 minutes of standing activity continuously without seated rest break with stable vitals and RPD </=3/10 and RPE </=13/20 for improved functional mobility  (Progressing)       Start:  01/05/24    Expected End:  01/19/24               Transfers       LTG - Patient will demonstrate safe transfer techniques (Progressing)       Start:  01/05/24    Expected End:  01/19/24                   Education Documentation  No documentation found.  Education Comments  PT education provided regarding MITT precautions, physician clearance to drive, and recommended dosage of performing incentive spirometry ~10 reps every hour during wake hours.

## 2024-01-05 NOTE — PROGRESS NOTES
"Occupational Therapy    Evaluation/Treatment    Patient Name: Jones Howell \"Samir\"  MRN: [id number]  : 1955  Today's Date: 24  Time Calculation  Start Time: 1324  Stop Time: 1350  Time Calculation (min): 26 min       Assessment:  OT Assessment: Samir is a 67yo male presenting with deficits in functional activity tolerance, IADLs, MITT precaution management, ADLs, transfers and bed mobility. Pt would benefit from skilled OT intervention to return to I  Prognosis: Excellent  Barriers to Discharge: None  Evaluation/Treatment Tolerance: Patient tolerated treatment well  Medical Staff Made Aware: Yes  End of Session Communication: Bedside nurse  End of Session Patient Position: Bed, 3 rail up, Alarm off, not on at start of session  OT Assessment Results: Decreased ADL status, Decreased endurance, Decreased functional mobility, Decreased gross motor control, Decreased IADLs  Prognosis: Excellent  Barriers to Discharge: None  Evaluation/Treatment Tolerance: Patient tolerated treatment well  Medical Staff Made Aware: Yes  Strengths: Ability to acquire knowledge, Attitude of self, Capable of completing ADLs semi/independent, Insight into problems, Leisure activity, Living arrangement secure, Physical health, Premorbid level of function, Support of Caregivers  Plan:  Treatment Interventions: ADL retraining, Functional transfer training, Endurance training, Patient/family training, Compensatory technique education  OT Frequency: 2 times per week  OT Discharge Recommendations: Low intensity level of continued care  OT Recommended Transfer Status: Assist of 1  OT - OK to Discharge: Yes  Treatment Interventions: ADL retraining, Functional transfer training, Endurance training, Patient/family training, Compensatory technique education    Subjective   Current Problem:  1. 2-vessel coronary artery disease  Anesthesia Intraoperative Transesophageal Echocardiogram    Anesthesia Intraoperative Transesophageal " Echocardiogram      2. Coronary artery disease of native artery of native heart with stable angina pectoris (CMS/HCC)          General:   OT Received On: 01/05/24  General  Reason for Referral: status post CABG x 2 (LIMA->LAD, RA->PDA) and posterior pericardial window  Past Medical History Relevant to Rehab: Hx: CAD, HLD, lymphoicytic colitis, sciatica  Family/Caregiver Present: No  Prior to Session Communication: Bedside nurse (NP)  Patient Position Received: Bed, 3 rail up, Alarm off, not on at start of session  Preferred Learning Style: verbal, visual  General Comment: Pt pleasant and willing to work with OT. Pt reporting fatigue but very motivated  Precautions:  Medical Precautions: Cardiac precautions, Fall precautions, Chest tube  Post-Surgical Precautions: Move in the Tube  Vital Signs:     Pain:  Pain Assessment  Pain Assessment: 0-10  Pain Score: 3  Pain Location: Incision  Pain Descriptors: Sore    Objective   Cognition:  Overall Cognitive Status: Within Functional Limits  Orientation Level: Oriented X4           Home Living:  Type of Home: House  Lives With: Spouse  Home Adaptive Equipment: Walker rolling or standard  Home Layout: Two level  Home Access: Stairs to enter with rails  Entrance Stairs-Number of Steps: 2  Bathroom Shower/Tub: Walk-in shower  Bathroom Toilet: Standard  Bathroom Equipment: Shower chair with back  Bathroom Accessibility: Half bath located on first floor  Prior Function:  Level of [place]: Independent with ADLs and functional transfers, Independent with homemaking with ambulation  ADL Assistance: Independent  Homemaking Assistance: Independent  Ambulatory Assistance: Independent  Vocational: Full time employment  Leisure: Walks dog frequently, enjoys exercising, very active  Prior Function Comments: + drives, no falls  IADL History:     ADL:  LE Dressing Assistance: Stand by  LE Dressing Deficit: Don/doff R sock, Don/doff L sock, Verbal cueing, Supervision/safety  Activities  of Daily Living: LE Dressing  LE Dressing: Yes  Sock Level of Assistance: Contact guard, Minimal verbal cues  LE Dressing Where Assessed: Edge of bed  LE Dressing Comments: education and cueing on figure 4 technique to maintain incision integrity  Activity Tolerance:  Endurance: Tolerates 10 - 20 min exercise with multiple rests  Functional Standing Tolerance:     Bed Mobility/Transfers: Bed Mobility  Bed Mobility: Yes  Bed Mobility 1  Bed Mobility 1: Supine to sitting, Log roll  Level of Assistance 1: Minimal verbal cues, Minimum assistance  Bed Mobility 2  Bed Mobility  2: Supine to sitting  Level of Assistance 2: Minimum assistance, Minimal verbal cues    Transfers  Transfer: Yes  Transfer 1  Transfer From 1: Sit to  Transfer to 1: Stand  Technique 1: Sit to stand  Transfer Device 1: Walker  Transfer Level of Assistance 1: Contact guard  Trials/Comments 1: cues for hand placement with MITT  Transfers 2  Transfer From 2: Stand to  Transfer to 2: Sit  Technique 2: Stand to sit  Transfer Device 2: Walker  Transfer Level of Assistance 2: Contact guard      Ambulation/Gait Training:  Ambulation/Gait Training  Ambulation/Gait Training Performed: Yes  Ambulation/Gait Training 1  Surface 1: Level tile  Device 1: Rolling walker  Assistance 1: Contact guard  Comments/Distance (ft) 1: ~70ft, education on activity pacing and self awareness of exertion     Therapy/Activity: Therapeutic Activity  Therapeutic Activity Performed: Yes  Therapeutic Activity 1: Functional mobility to assess endurance and provide patient with education on RPE and activity pacing  Therapeutic Activity 2: Education on MITT precautions, and application to ADLs/IADLs/transfers    Vision:Vision - Basic Assessment  Current Vision: No visual deficits  Sensation:  Light Touch: No apparent deficits  Strength:       Coordination:  Movements are Fluid and Coordinated: Yes   Hand Function:     Extremities: RUE   RUE : Within Functional Limits and LUE   LUE:  Within Functional Limits      Outcome Measures: [facility] Daily Activity  Putting on and taking off regular lower body clothing: A little  Bathing (including washing, rinsing, drying): A little  Putting on and taking off regular upper body clothing: A little  Toileting, which includes using toilet, bedpan or urinal: A little  Taking care of personal grooming such as brushing teeth: None  Eating Meals: None  Daily Activity - Total Score: 20         and OT Adult Other Outcome Measures  4AT: 0, negative    Education Documentation  Handouts, taught by Bell Mayberry OT at 1/5/2024  3:15 PM.  Learner: Patient  Readiness: Acceptance  Method: Explanation  Response: Verbalizes Understanding  Comment: MITT education provided and reviewed    Body Mechanics, taught by Bell Mayberry OT at 1/5/2024  3:15 PM.  Learner: Patient  Readiness: Acceptance  Method: Explanation  Response: Verbalizes Understanding  Comment: MITT education provided and reviewed    Precautions, taught by Bell Mayberry OT at 1/5/2024  3:15 PM.  Learner: Patient  Readiness: Acceptance  Method: Explanation  Response: Verbalizes Understanding  Comment: MITT education provided and reviewed    Home Exercise Program, taught by Bell Mayberry OT at 1/5/2024  3:15 PM.  Learner: Patient  Readiness: Acceptance  Method: Explanation  Response: Verbalizes Understanding  Comment: MITT education provided and reviewed    ADL Training, taught by Bell Mayberry OT at 1/5/2024  3:15 PM.  Learner: Patient  Readiness: Acceptance  Method: Explanation  Response: Verbalizes Understanding  Comment: MITT education provided and reviewed    Education Comments  No comments found.        OP EDUCATION:       Goals:  Encounter Problems       Encounter Problems (Active)       ADLs       Patient with complete lower body dressing with independent level of assistance donning and doffing all LE clothes  with no adaptive equipment while edge of bed  (Progressing)       Start:  01/05/24     Expected End:  01/26/24            Patient will complete toileting including hygiene clothing management/hygiene with independent level of assistance. (Progressing)       Start:  01/05/24    Expected End:  01/26/24            Pt will demo simulated IADL tasks in dynamic sitting/standing for full duration of task with no significant change in vitals with [place] (Progressing)       Start:  01/05/24    Expected End:  01/26/24                 COGNITION/SAFETY       Patient will recall and adhere to MITT precautions during all functional mobility/ADL tasks in order to demonstrate improved understanding and promote healing post op (Progressing)       Start:  01/05/24    Expected End:  01/26/24                 TRANSFERS       Patient will perform bed mobility independent level of assistance in order to improve safety and independence with mobility (Progressing)       Start:  01/05/24    Expected End:  01/26/24            Patient will complete functional transfer to toilet with least restrictive device with independent level of assistance. (Progressing)       Start:  01/05/24    Expected End:  01/26/24 01/05/24 at 3:17 PM - Bell Mayberry OT

## 2024-01-06 ENCOUNTER — APPOINTMENT (OUTPATIENT)
Dept: RADIOLOGY | Facility: HOSPITAL | Age: 69
DRG: 236 | End: 2024-01-06
Payer: COMMERCIAL

## 2024-01-06 LAB
ALBUMIN SERPL BCP-MCNC: 3.9 G/DL (ref 3.4–5)
ANION GAP SERPL CALC-SCNC: 11 MMOL/L (ref 10–20)
BUN SERPL-MCNC: 17 MG/DL (ref 6–23)
CALCIUM SERPL-MCNC: 8.8 MG/DL (ref 8.6–10.6)
CHLORIDE SERPL-SCNC: 99 MMOL/L (ref 98–107)
CO2 SERPL-SCNC: 26 MMOL/L (ref 21–32)
CREAT SERPL-MCNC: 0.94 MG/DL (ref 0.5–1.3)
ERYTHROCYTE [DISTWIDTH] IN BLOOD BY AUTOMATED COUNT: 12.9 % (ref 11.5–14.5)
GFR SERPL CREATININE-BSD FRML MDRD: 89 ML/MIN/1.73M*2
GLUCOSE BLD MANUAL STRIP-MCNC: 107 MG/DL (ref 74–99)
GLUCOSE BLD MANUAL STRIP-MCNC: 83 MG/DL (ref 74–99)
GLUCOSE BLD MANUAL STRIP-MCNC: 96 MG/DL (ref 74–99)
GLUCOSE SERPL-MCNC: 116 MG/DL (ref 74–99)
HCT VFR BLD AUTO: 29.4 % (ref 41–52)
HGB BLD-MCNC: 9.7 G/DL (ref 13.5–17.5)
MAGNESIUM SERPL-MCNC: 1.88 MG/DL (ref 1.6–2.4)
MCH RBC QN AUTO: 29.2 PG (ref 26–34)
MCHC RBC AUTO-ENTMCNC: 33 G/DL (ref 32–36)
MCV RBC AUTO: 89 FL (ref 80–100)
NRBC BLD-RTO: 0 /100 WBCS (ref 0–0)
PHOSPHATE SERPL-MCNC: 2.2 MG/DL (ref 2.5–4.9)
PLATELET # BLD AUTO: 177 X10*3/UL (ref 150–450)
POTASSIUM SERPL-SCNC: 4 MMOL/L (ref 3.5–5.3)
RBC # BLD AUTO: 3.32 X10*6/UL (ref 4.5–5.9)
SODIUM SERPL-SCNC: 132 MMOL/L (ref 136–145)
WBC # BLD AUTO: 12.4 X10*3/UL (ref 4.4–11.3)

## 2024-01-06 PROCEDURE — 85027 COMPLETE CBC AUTOMATED: CPT | Performed by: NURSE PRACTITIONER

## 2024-01-06 PROCEDURE — 2500000004 HC RX 250 GENERAL PHARMACY W/ HCPCS (ALT 636 FOR OP/ED): Performed by: NURSE PRACTITIONER

## 2024-01-06 PROCEDURE — 2500000005 HC RX 250 GENERAL PHARMACY W/O HCPCS: Performed by: NURSE PRACTITIONER

## 2024-01-06 PROCEDURE — 36415 COLL VENOUS BLD VENIPUNCTURE: CPT | Performed by: NURSE PRACTITIONER

## 2024-01-06 PROCEDURE — 1200000002 HC GENERAL ROOM WITH TELEMETRY DAILY

## 2024-01-06 PROCEDURE — 71045 X-RAY EXAM CHEST 1 VIEW: CPT | Performed by: RADIOLOGY

## 2024-01-06 PROCEDURE — 2500000001 HC RX 250 WO HCPCS SELF ADMINISTERED DRUGS (ALT 637 FOR MEDICARE OP): Performed by: NURSE PRACTITIONER

## 2024-01-06 PROCEDURE — 82947 ASSAY GLUCOSE BLOOD QUANT: CPT

## 2024-01-06 PROCEDURE — 99231 SBSQ HOSP IP/OBS SF/LOW 25: CPT | Performed by: STUDENT IN AN ORGANIZED HEALTH CARE EDUCATION/TRAINING PROGRAM

## 2024-01-06 PROCEDURE — 71045 X-RAY EXAM CHEST 1 VIEW: CPT

## 2024-01-06 PROCEDURE — 83735 ASSAY OF MAGNESIUM: CPT | Performed by: NURSE PRACTITIONER

## 2024-01-06 PROCEDURE — 84100 ASSAY OF PHOSPHORUS: CPT | Performed by: NURSE PRACTITIONER

## 2024-01-06 RX ORDER — MAGNESIUM SULFATE HEPTAHYDRATE 40 MG/ML
2 INJECTION, SOLUTION INTRAVENOUS ONCE
Status: COMPLETED | OUTPATIENT
Start: 2024-01-06 | End: 2024-01-06

## 2024-01-06 RX ADMIN — POLYETHYLENE GLYCOL 3350 17 G: 17 POWDER, FOR SOLUTION ORAL at 09:15

## 2024-01-06 RX ADMIN — ISOSORBIDE MONONITRATE 30 MG: 30 TABLET, EXTENDED RELEASE ORAL at 09:14

## 2024-01-06 RX ADMIN — HEPARIN SODIUM 5000 UNITS: 5000 INJECTION INTRAVENOUS; SUBCUTANEOUS at 09:15

## 2024-01-06 RX ADMIN — CEFAZOLIN SODIUM 2 G: 2 INJECTION, SOLUTION INTRAVENOUS at 04:34

## 2024-01-06 RX ADMIN — OXYCODONE HYDROCHLORIDE 5 MG: 5 TABLET ORAL at 09:14

## 2024-01-06 RX ADMIN — MAGNESIUM SULFATE HEPTAHYDRATE 2 G: 40 INJECTION, SOLUTION INTRAVENOUS at 13:40

## 2024-01-06 RX ADMIN — HEPARIN SODIUM 5000 UNITS: 5000 INJECTION INTRAVENOUS; SUBCUTANEOUS at 16:57

## 2024-01-06 RX ADMIN — ACETAMINOPHEN 650 MG: 325 TABLET ORAL at 04:34

## 2024-01-06 RX ADMIN — METOPROLOL TARTRATE 12.5 MG: 25 TABLET, FILM COATED ORAL at 21:27

## 2024-01-06 RX ADMIN — OXYCODONE HYDROCHLORIDE 5 MG: 5 TABLET ORAL at 13:40

## 2024-01-06 RX ADMIN — Medication 1 TABLET: at 09:14

## 2024-01-06 RX ADMIN — ATORVASTATIN CALCIUM 40 MG: 40 TABLET, FILM COATED ORAL at 09:14

## 2024-01-06 RX ADMIN — METOPROLOL TARTRATE 12.5 MG: 25 TABLET, FILM COATED ORAL at 09:14

## 2024-01-06 RX ADMIN — ASPIRIN 81 MG CHEWABLE TABLET 81 MG: 81 TABLET CHEWABLE at 09:14

## 2024-01-06 RX ADMIN — SENNOSIDES AND DOCUSATE SODIUM 2 TABLET: 8.6; 5 TABLET ORAL at 21:27

## 2024-01-06 RX ADMIN — SODIUM PHOSPHATE, MONOBASIC, MONOHYDRATE AND SODIUM PHOSPHATE, DIBASIC, ANHYDROUS 15 MMOL: 142; 276 INJECTION, SOLUTION INTRAVENOUS at 16:56

## 2024-01-06 RX ADMIN — HEPARIN SODIUM 5000 UNITS: 5000 INJECTION INTRAVENOUS; SUBCUTANEOUS at 01:38

## 2024-01-06 RX ADMIN — POLYSACCHARIDE-IRON COMPLEX 150 MG: 150 CAPSULE ORAL at 09:14

## 2024-01-06 RX ADMIN — Medication: at 08:00

## 2024-01-06 RX ADMIN — ACETAMINOPHEN 650 MG: 325 TABLET ORAL at 16:56

## 2024-01-06 RX ADMIN — SENNOSIDES AND DOCUSATE SODIUM 2 TABLET: 8.6; 5 TABLET ORAL at 09:15

## 2024-01-06 RX ADMIN — ACETAMINOPHEN 650 MG: 325 TABLET ORAL at 11:44

## 2024-01-06 RX ADMIN — OXYCODONE HYDROCHLORIDE 5 MG: 5 TABLET ORAL at 21:27

## 2024-01-06 ASSESSMENT — COGNITIVE AND FUNCTIONAL STATUS - GENERAL
CLIMB 3 TO 5 STEPS WITH RAILING: A LITTLE
DRESSING REGULAR UPPER BODY CLOTHING: A LITTLE
MOBILITY SCORE: 22
HELP NEEDED FOR BATHING: A LITTLE
DRESSING REGULAR LOWER BODY CLOTHING: A LITTLE
DAILY ACTIVITIY SCORE: 21
DAILY ACTIVITIY SCORE: 24
CLIMB 3 TO 5 STEPS WITH RAILING: A LITTLE
MOBILITY SCORE: 23
WALKING IN HOSPITAL ROOM: A LITTLE

## 2024-01-06 ASSESSMENT — PAIN SCALES - GENERAL
PAINLEVEL_OUTOF10: 0 - NO PAIN
PAINLEVEL_OUTOF10: 0 - NO PAIN
PAINLEVEL_OUTOF10: 5 - MODERATE PAIN
PAINLEVEL_OUTOF10: 5 - MODERATE PAIN
PAINLEVEL_OUTOF10: 2
PAINLEVEL_OUTOF10: 0 - NO PAIN
PAINLEVEL_OUTOF10: 6

## 2024-01-06 ASSESSMENT — PAIN DESCRIPTION - DESCRIPTORS
DESCRIPTORS: ACHING;SHARP;SHOOTING
DESCRIPTORS: SORE;BURNING
DESCRIPTORS: SORE

## 2024-01-06 ASSESSMENT — PAIN - FUNCTIONAL ASSESSMENT
PAIN_FUNCTIONAL_ASSESSMENT: 0-10

## 2024-01-06 NOTE — PROGRESS NOTES
CARDIAC SURGERY DAILY PROGRESS NOTE    Jones Howell is a 68 y.o. male, with a PMH of CAD, HLD, Celiac Disease, cataracts, lymphocitic colitis, GERD, and sciatica s/p prior lumbar spinal surgery, who presented to [facility] on 1/4/2024 for coronary artery disease.  Patient underwent calcium scoring which had a coronary calcium score of greater than 700.  He had an echocardiogram which showed mild to moderate decrease in LV function, particularly with decreased contractility of the anterior wall.  Coronary angiography demonstrated high-grade stenosis of his LAD, right coronary artery, and posterior descending artery.  He had mild disease of his diagonal and circumflex coronary artery system.  Patient referred for coronary artery bypass grafting.      OPERATION/PROCEDURE: Median Sternotomy, Coronary Artery BYpass Grafting x 2 with Left Internal Thoracic Artery and Radial, posterior pericardiotomy  with Juan A Jimenez MD on 1/4/2024    CTICU Course: Uncomplicated. Extubated w/o difficulty postop on 1/4 and weaned to room air. Acute pain consulted now s/p bilateral SAP blockers.     Transferred to T3 on 1/5/2024      Interval History:   Transferred out of cticu POD1 to LT3. No acute event overnight. NSR on telemetry with back up vvi 50 pacer wires.     1/6/2024 Left pleural chest tube removed without difficulty - vital signs remained stable- no c/o chest pain and or SOB. STAT portable chest xray ordered.    SUBJECTIVE:  No acute event overnight on exam this morning alert and oriented- incisional pain tolerable. Appetite improving- no bowel movement yet.    Objective   /78 (BP Location: Right arm, Patient Position: Sitting)   Pulse 78   Temp 36.6 °C (97.9 °F) (Temporal)   Resp 18   Ht 1.829 m (6')   Wt 95.4 kg (210 lb 4.8 oz)   SpO2 95%   BMI 28.52 kg/m²   Pain Score: 0 - No pain   3 Day Weight Change: Unable to Calculate    Intake and Output    Intake/Output Summary (Last 24 hours) at  1/6/2024 1141  Last data filed at 1/6/2024 1100  Gross per 24 hour   Intake 480 ml   Output 1918 ml   Net -1438 ml       Physical Exam  Physical Exam  Constitutional:       General: He is not in acute distress.  HENT:      Head: Normocephalic.      Mouth/Throat:      Mouth: Mucous membranes are moist.   Eyes:      Conjunctiva/sclera: Conjunctivae normal.   Cardiovascular:      Rate and Rhythm: Normal rate and regular rhythm.      Heart sounds: Normal heart sounds.   Pulmonary:      Breath sounds: Normal breath sounds.      Comments: 3 chest tubes to wall suction - + tidalling, no air leak.  Abdominal:      General: Bowel sounds are normal.      Palpations: Abdomen is soft.      Comments: No post op BM   Skin:     General: Skin is warm and dry.      Findings: Erythema present.      Comments: Midline chest incision well approximated, no erythema, no drainage, no ooze. 3 chest tubes insert sites no drainage no ooze, no crepitus.   Neurological:      General: No focal deficit present.      Mental Status: He is alert and oriented to person, place, and time.   Psychiatric:         Mood and Affect: Mood normal.           Medications  Scheduled medications  acetaminophen, 650 mg, oral, q6h  aspirin, 81 mg, oral, Daily  atorvastatin, 40 mg, oral, Daily  heparin (porcine), 5,000 Units, subcutaneous, q8h  insulin lispro, 0-5 Units, subcutaneous, TID with meals  iron polysaccharides, 150 mg, oral, Daily  isosorbide mononitrate ER, 30 mg, oral, Daily  metoprolol tartrate, 12.5 mg, oral, BID  multivitamin with minerals, 1 tablet, oral, Daily  oxygen, , inhalation, Continuous - 02/gases  polyethylene glycol, 17 g, oral, BID  sennosides-docusate sodium, 2 tablet, oral, BID    Continuous medications   PRN medications  PRN medications: dextrose **OR** glucagon, oxyCODONE, oxygen    Labs  Results for orders placed or performed during the hospital encounter of 01/04/24 (from the past 24 hour(s))   POCT GLUCOSE   Result Value Ref Range     POCT Glucose 116 (H) 74 - 99 mg/dL   POCT GLUCOSE   Result Value Ref Range    POCT Glucose 105 (H) 74 - 99 mg/dL   POCT GLUCOSE   Result Value Ref Range    POCT Glucose 109 (H) 74 - 99 mg/dL   POCT GLUCOSE   Result Value Ref Range    POCT Glucose 96 74 - 99 mg/dL   CBC   Result Value Ref Range    WBC 12.4 (H) 4.4 - 11.3 x10*3/uL    nRBC 0.0 0.0 - 0.0 /100 WBCs    RBC 3.32 (L) 4.50 - 5.90 x10*6/uL    Hemoglobin 9.7 (L) 13.5 - 17.5 g/dL    Hematocrit 29.4 (L) 41.0 - 52.0 %    MCV 89 80 - 100 fL    MCH 29.2 26.0 - 34.0 pg    MCHC 33.0 32.0 - 36.0 g/dL    RDW 12.9 11.5 - 14.5 %    Platelets 177 150 - 450 x10*3/uL             IMPRESSION & PLAN:  POD # 2 s/p CABG x2 and posterior pericardial window  - Increase activity/ ambulation; PT/OT  - Encourage IS, C/DB; respiratory therapy; wean O2 as steve   - Cardiac rehab referral   - Continue cardiac meds: ASA, BB, and statin   - started Imdur 30mg  - will continue holding home losartan and rosuvastatin  - Pain and anticonstipation meds  -  chest tubes: left pleural and left mediastinal in place to water seal; - Maintain chest tubes until output down/removal ok with surgeon; monitor daily 1v PCXR while chest tubes in place   - 1/6 left pleural chest tube removed  - 2v CXR prior to discharge   - Remove (pull) epicardial wires prior to discharge   - Tele until discharge  - Optimize nutrition and electrolytes    Rhythm  - Tele: SR with HR 60-80's  - Continue BB  - Adjust medications as tolerated  - VVI 50/10 epicardial wire back up    Acute Blood Loss Anemia   Recent Labs     01/06/24  1048 01/05/24  0207 01/04/24  1427 12/22/23  0948 11/30/23  0838 10/19/23  0814 11/24/21  0836   HGB 9.7* 10.6* 10.8* 12.5* 13.1* 12.8* 12.8*   HCT 29.4* 32.2* 32.6* 38.4* 40.0* 40.6* 40.3*   - MV, PO Iron x1mo  - Daily labs, transfuse as indicated    Thrombocytopenia  Recent Labs     01/06/24  1048 01/05/24  0207 01/04/24  1427 12/22/23  0948 11/30/23  0838 10/19/23  0814 11/24/21  0836     157 192 211 261 244 285   - Etiology likely postop/CPB related  - Continue to trend with daily CBCs    Volume/Electrolyte Status: Preop wt Weight: 93 kg (205 lb 0.4 oz)   Vitals:    01/06/24 0139   Weight: 95.4 kg (210 lb 4.8 oz)     - Weight: 95.4 ( X, 93 kg)  - Adjust diuresis as needed for postop cardiac surgery hypervolemia  - Replete electrolytes for hypokalemia/hypomagnesemia/hypophosphatemia as needed   - 1/6 gave 2 gms magnesium sulfate and 15 mmol sodium phosphate  - Daily weights and strict I&Os  - Daily RFP while admitted    HLD  - c/w atorvastatin 40mg  - holding home rosuvastatin 20mg    HTN  - bp systolic  118-141  - c/w metoprolol tartrate by mouth 12.5mg bid  - c/w isosorbide mononitrate 30mg by mouth daily   - holding home losartan 25mg    Celiac Disease  - c/w gluten free diet    GERD  - not on ppi home    VTE Prophylaxis: SCDs/TEDs, ambulation, SQ heparin  Code Status: Full Code    Dispo  - PT/OT recs skilled pt/ot 3 times  a week  - cardiac rehab order placed  - Would benefit from homecare for cardiac surgery carepath and RN visits  - Anticipate discharge 2-3 days, pending removal chest tubes- medical optimization  - Will continue to assess discharge needs      ULISES Holcomb-CNP  Cardiac Surgery [facility]  [facility]  Team Phone [phone]    1/6/2024  11:41 AM

## 2024-01-06 NOTE — PROGRESS NOTES
Acute Pain Service    Postop Pain HPI -   Palliative: relieved with IV analgesics and regional local anesthetics  Provocative: movement  Quality:  burning and aching  Radiation:  none  Severity:  3/10  Timing: constant    24-HOUR OPIOID CONSUMPTION:  Dilaudid 0.6 mg  Oxycodone 20 mg    Scheduled medications  acetaminophen, 650 mg, oral, q6h  aspirin, 81 mg, oral, Daily  atorvastatin, 40 mg, oral, Daily  heparin (porcine), 5,000 Units, subcutaneous, q8h  insulin lispro, 0-5 Units, subcutaneous, TID with meals  iron polysaccharides, 150 mg, oral, Daily  isosorbide mononitrate ER, 30 mg, oral, Daily  metoprolol tartrate, 12.5 mg, oral, BID  multivitamin with minerals, 1 tablet, oral, Daily  oxygen, , inhalation, Continuous - 02/gases  polyethylene glycol, 17 g, oral, BID  sennosides-docusate sodium, 2 tablet, oral, BID      Continuous medications     PRN medications  PRN medications: dextrose **OR** glucagon, oxyCODONE, oxygen     Physical Exam:  Constitutional:  no distress, alert and cooperative  Eyes: clear sclera  Head/Neck: No apparent injury, trachea midline  Respiratory/Thorax: Patent airways, thorax symmetric, breathing comfortably  Cardiovascular: no pitting edema  Gastrointestinal: Nondistended  Musculoskeletal: ROM intact  Extremities: no clubbing  Neurological: alert, stokes x4  Psychological: Appropriate affect    Results for orders placed or performed during the hospital encounter of 01/04/24 (from the past 24 hour(s))   POCT GLUCOSE   Result Value Ref Range    POCT Glucose 116 (H) 74 - 99 mg/dL   POCT GLUCOSE   Result Value Ref Range    POCT Glucose 105 (H) 74 - 99 mg/dL   POCT GLUCOSE   Result Value Ref Range    POCT Glucose 109 (H) 74 - 99 mg/dL   POCT GLUCOSE   Result Value Ref Range    POCT Glucose 96 74 - 99 mg/dL      Jones Howell is a 68 y.o. year old male patient who presents for CABGx2 with Dr. Camilo. Acute Pain consulted for block for postoperative pain control.      Plan:     - Bilateral SAP  blocks with catheters and single shot subcostal nerve blocks performed postoperatively on 1/4  - Catheters removed this morning  - Acute pain service will sign off     Acute Pain Resident  pg [phone] ph [phone]

## 2024-01-07 ENCOUNTER — APPOINTMENT (OUTPATIENT)
Dept: RADIOLOGY | Facility: HOSPITAL | Age: 69
DRG: 236 | End: 2024-01-07
Payer: COMMERCIAL

## 2024-01-07 LAB
ALBUMIN SERPL BCP-MCNC: 3.9 G/DL (ref 3.4–5)
ANION GAP SERPL CALC-SCNC: 15 MMOL/L (ref 10–20)
BLOOD EXPIRATION DATE: NORMAL
BUN SERPL-MCNC: 11 MG/DL (ref 6–23)
CALCIUM SERPL-MCNC: 8.8 MG/DL (ref 8.6–10.6)
CHLORIDE SERPL-SCNC: 102 MMOL/L (ref 98–107)
CO2 SERPL-SCNC: 25 MMOL/L (ref 21–32)
CREAT SERPL-MCNC: 0.83 MG/DL (ref 0.5–1.3)
DISPENSE STATUS: NORMAL
ERYTHROCYTE [DISTWIDTH] IN BLOOD BY AUTOMATED COUNT: 12.7 % (ref 11.5–14.5)
GFR SERPL CREATININE-BSD FRML MDRD: >90 ML/MIN/1.73M*2
GLUCOSE BLD MANUAL STRIP-MCNC: 104 MG/DL (ref 74–99)
GLUCOSE BLD MANUAL STRIP-MCNC: 110 MG/DL (ref 74–99)
GLUCOSE BLD MANUAL STRIP-MCNC: 119 MG/DL (ref 74–99)
GLUCOSE BLD MANUAL STRIP-MCNC: 98 MG/DL (ref 74–99)
GLUCOSE SERPL-MCNC: 102 MG/DL (ref 74–99)
HCT VFR BLD AUTO: 30.1 % (ref 41–52)
HGB BLD-MCNC: 10 G/DL (ref 13.5–17.5)
MAGNESIUM SERPL-MCNC: 1.9 MG/DL (ref 1.6–2.4)
MCH RBC QN AUTO: 28.8 PG (ref 26–34)
MCHC RBC AUTO-ENTMCNC: 33.2 G/DL (ref 32–36)
MCV RBC AUTO: 87 FL (ref 80–100)
NRBC BLD-RTO: 0 /100 WBCS (ref 0–0)
PHOSPHATE SERPL-MCNC: 2.6 MG/DL (ref 2.5–4.9)
PLATELET # BLD AUTO: 181 X10*3/UL (ref 150–450)
POTASSIUM SERPL-SCNC: 3.7 MMOL/L (ref 3.5–5.3)
PRODUCT BLOOD TYPE: 6200
PRODUCT CODE: NORMAL
RBC # BLD AUTO: 3.47 X10*6/UL (ref 4.5–5.9)
SODIUM SERPL-SCNC: 138 MMOL/L (ref 136–145)
UNIT ABO: NORMAL
UNIT NUMBER: NORMAL
UNIT RH: NORMAL
UNIT VOLUME: 350
WBC # BLD AUTO: 7.9 X10*3/UL (ref 4.4–11.3)
XM INTEP: NORMAL

## 2024-01-07 PROCEDURE — 2500000004 HC RX 250 GENERAL PHARMACY W/ HCPCS (ALT 636 FOR OP/ED): Performed by: CLINICAL NURSE SPECIALIST

## 2024-01-07 PROCEDURE — 82947 ASSAY GLUCOSE BLOOD QUANT: CPT

## 2024-01-07 PROCEDURE — 71045 X-RAY EXAM CHEST 1 VIEW: CPT

## 2024-01-07 PROCEDURE — 2500000001 HC RX 250 WO HCPCS SELF ADMINISTERED DRUGS (ALT 637 FOR MEDICARE OP): Performed by: NURSE PRACTITIONER

## 2024-01-07 PROCEDURE — 85027 COMPLETE CBC AUTOMATED: CPT | Performed by: NURSE PRACTITIONER

## 2024-01-07 PROCEDURE — 80069 RENAL FUNCTION PANEL: CPT | Performed by: NURSE PRACTITIONER

## 2024-01-07 PROCEDURE — 1200000002 HC GENERAL ROOM WITH TELEMETRY DAILY

## 2024-01-07 PROCEDURE — 83735 ASSAY OF MAGNESIUM: CPT | Performed by: NURSE PRACTITIONER

## 2024-01-07 PROCEDURE — 99232 SBSQ HOSP IP/OBS MODERATE 35: CPT | Performed by: CLINICAL NURSE SPECIALIST

## 2024-01-07 PROCEDURE — 2500000004 HC RX 250 GENERAL PHARMACY W/ HCPCS (ALT 636 FOR OP/ED): Performed by: NURSE PRACTITIONER

## 2024-01-07 PROCEDURE — 2500000001 HC RX 250 WO HCPCS SELF ADMINISTERED DRUGS (ALT 637 FOR MEDICARE OP): Performed by: CLINICAL NURSE SPECIALIST

## 2024-01-07 PROCEDURE — 71045 X-RAY EXAM CHEST 1 VIEW: CPT | Performed by: RADIOLOGY

## 2024-01-07 RX ORDER — METOPROLOL TARTRATE 25 MG/1
25 TABLET, FILM COATED ORAL 2 TIMES DAILY
Status: DISCONTINUED | OUTPATIENT
Start: 2024-01-07 | End: 2024-01-09 | Stop reason: HOSPADM

## 2024-01-07 RX ORDER — POTASSIUM CHLORIDE 20 MEQ/1
40 TABLET, EXTENDED RELEASE ORAL ONCE
Status: COMPLETED | OUTPATIENT
Start: 2024-01-07 | End: 2024-01-07

## 2024-01-07 RX ORDER — LANOLIN ALCOHOL/MO/W.PET/CERES
400 CREAM (GRAM) TOPICAL DAILY
Status: DISCONTINUED | OUTPATIENT
Start: 2024-01-07 | End: 2024-01-09 | Stop reason: HOSPADM

## 2024-01-07 RX ADMIN — ACETAMINOPHEN 650 MG: 325 TABLET ORAL at 18:24

## 2024-01-07 RX ADMIN — ACETAMINOPHEN 650 MG: 325 TABLET ORAL at 06:37

## 2024-01-07 RX ADMIN — ASPIRIN 81 MG CHEWABLE TABLET 81 MG: 81 TABLET CHEWABLE at 08:32

## 2024-01-07 RX ADMIN — POTASSIUM CHLORIDE 40 MEQ: 1500 TABLET, EXTENDED RELEASE ORAL at 13:13

## 2024-01-07 RX ADMIN — HEPARIN SODIUM 5000 UNITS: 5000 INJECTION INTRAVENOUS; SUBCUTANEOUS at 16:10

## 2024-01-07 RX ADMIN — METOPROLOL TARTRATE 12.5 MG: 25 TABLET, FILM COATED ORAL at 08:33

## 2024-01-07 RX ADMIN — ACETAMINOPHEN 650 MG: 325 TABLET ORAL at 12:50

## 2024-01-07 RX ADMIN — POLYSACCHARIDE-IRON COMPLEX 150 MG: 150 CAPSULE ORAL at 08:33

## 2024-01-07 RX ADMIN — Medication 1 TABLET: at 08:32

## 2024-01-07 RX ADMIN — ACETAMINOPHEN 650 MG: 325 TABLET ORAL at 00:57

## 2024-01-07 RX ADMIN — HEPARIN SODIUM 5000 UNITS: 5000 INJECTION INTRAVENOUS; SUBCUTANEOUS at 08:32

## 2024-01-07 RX ADMIN — ATORVASTATIN CALCIUM 40 MG: 40 TABLET, FILM COATED ORAL at 08:32

## 2024-01-07 RX ADMIN — ISOSORBIDE MONONITRATE 30 MG: 30 TABLET, EXTENDED RELEASE ORAL at 08:34

## 2024-01-07 RX ADMIN — HEPARIN SODIUM 5000 UNITS: 5000 INJECTION INTRAVENOUS; SUBCUTANEOUS at 00:57

## 2024-01-07 RX ADMIN — MAGNESIUM OXIDE TAB 400 MG (241.3 MG ELEMENTAL MG) 400 MG: 400 (241.3 MG) TAB at 13:13

## 2024-01-07 RX ADMIN — OXYCODONE HYDROCHLORIDE 5 MG: 5 TABLET ORAL at 02:46

## 2024-01-07 RX ADMIN — METOPROLOL TARTRATE 25 MG: 25 TABLET, FILM COATED ORAL at 20:44

## 2024-01-07 RX ADMIN — OXYCODONE HYDROCHLORIDE 5 MG: 5 TABLET ORAL at 20:44

## 2024-01-07 RX ADMIN — OXYCODONE HYDROCHLORIDE 5 MG: 5 TABLET ORAL at 16:10

## 2024-01-07 ASSESSMENT — PAIN - FUNCTIONAL ASSESSMENT
PAIN_FUNCTIONAL_ASSESSMENT: 0-10

## 2024-01-07 ASSESSMENT — PAIN DESCRIPTION - LOCATION: LOCATION: STERNUM

## 2024-01-07 ASSESSMENT — COGNITIVE AND FUNCTIONAL STATUS - GENERAL
MOBILITY SCORE: 24
DAILY ACTIVITIY SCORE: 24

## 2024-01-07 ASSESSMENT — PAIN SCALES - GENERAL
PAINLEVEL_OUTOF10: 3
PAINLEVEL_OUTOF10: 0 - NO PAIN
PAINLEVEL_OUTOF10: 5 - MODERATE PAIN
PAINLEVEL_OUTOF10: 2
PAINLEVEL_OUTOF10: 2
PAINLEVEL_OUTOF10: 5 - MODERATE PAIN
PAINLEVEL_OUTOF10: 4
PAINLEVEL_OUTOF10: 2
PAINLEVEL_OUTOF10: 3

## 2024-01-07 ASSESSMENT — PAIN DESCRIPTION - DESCRIPTORS
DESCRIPTORS: ACHING;SHOOTING;SORE
DESCRIPTORS: ACHING;SORE
DESCRIPTORS: ACHING;CRAMPING

## 2024-01-07 ASSESSMENT — PAIN DESCRIPTION - ORIENTATION: ORIENTATION: MID

## 2024-01-07 NOTE — CARE PLAN
Problem: Pain - Adult  Goal: Verbalizes/displays adequate comfort level or baseline comfort level  Outcome: Progressing     Problem: Safety - Adult  Goal: Free from fall injury  Outcome: Progressing     Problem: Discharge Planning  Goal: Discharge to home or other facility with appropriate resources  Outcome: Progressing     Problem: Chronic Conditions and Co-morbidities  Goal: Patient's chronic conditions and co-morbidity symptoms are monitored and maintained or improved  Outcome: Progressing     Problem: Pain  Goal: Takes deep breaths with improved pain control throughout the shift  Outcome: Progressing  Goal: Turns in bed with improved pain control throughout the shift  Outcome: Progressing  Goal: Walks with improved pain control throughout the shift  Outcome: Progressing  Goal: Performs ADL's with improved pain control throughout shift  Outcome: Progressing  Goal: Participates in PT with improved pain control throughout the shift  Outcome: Progressing  Goal: Free from opioid side effects throughout the shift  Outcome: Progressing   The patient's goals for the shift include      The clinical goals for the shift include Pt to increase activity tolerance and be free of falls throughout shift.

## 2024-01-07 NOTE — PROGRESS NOTES
CARDIAC SURGERY DAILY PROGRESS NOTE    Jones Howell is a 68 y.o. male, with a PMH of CAD, HLD, Celiac Disease, cataracts, lymphocitic colitis, GERD, and sciatica s/p prior lumbar spinal surgery, who presented to [facility] on 1/4/2024 for coronary artery disease.  Patient underwent calcium scoring which had a coronary calcium score of greater than 700.  He had an echocardiogram which showed mild to moderate decrease in LV function, particularly with decreased contractility of the anterior wall.  Coronary angiography demonstrated high-grade stenosis of his LAD, right coronary artery, and posterior descending artery.  He had mild disease of his diagonal and circumflex coronary artery system.  Patient referred for coronary artery bypass grafting.      OPERATION/PROCEDURE: Median Sternotomy, Coronary Artery BYpass Grafting x 2 with Left Internal Thoracic Artery and Radial, posterior pericardiotomy  with Juan A Jimenez MD on 1/4/2024    CTICU Course: Uncomplicated. Extubated w/o difficulty postop on 1/4 and weaned to room air. Acute pain consulted now s/p bilateral SAP blockers.     Transferred to  on 1/5/2024 on POD 1; No acute event overnight. NSR on telemetry with back up vvi 50 pacer wires.     Interval History:   Left pleural CT removed 1/6/24    SUBJECTIVE:  Walking in halls; pacer attached/off ; MS CT to , no air leak, minimal serosang drainage, tubes removed     Objective   /66 (BP Location: Right arm, Patient Position: Sitting)   Pulse 68   Temp 36.4 °C (97.5 °F) (Temporal)   Resp 18   Ht 1.829 m (6')   Wt 92.9 kg (204 lb 12.8 oz)   SpO2 96%   BMI 27.78 kg/m²   Pain Score: 2   3 Day Weight Change: -0.034 kg (-1.2 oz) per day    Intake and Output    Intake/Output Summary (Last 24 hours) at 1/7/2024 1549  Last data filed at 1/7/2024 1500  Gross per 24 hour   Intake 1540 ml   Output 3590 ml   Net -2050 ml       Physical Exam  Physical Exam  Constitutional:       General: He is  not in acute distress.  HENT:      Head: Normocephalic.      Mouth/Throat:      Mouth: Mucous membranes are moist.   Eyes:      Conjunctiva/sclera: Conjunctivae normal.   Cardiovascular:      Rate and Rhythm: Normal rate and regular rhythm.      Heart sounds: Normal heart sounds.   Pulmonary:      Breath sounds: Normal breath sounds.      Comments: 2 chest tubes to WS- + tidalling, serosang drainage, no air leak.  Abdominal:      General: Bowel sounds are normal.      Palpations: Abdomen is soft.      Comments: + post op BM    Genitourinary:     Comments: Voiding; denies difficulty   Skin:     General: Skin is warm and dry.      Findings: Erythema present.      Comments: Midline chest incision well approximated, no erythema, no drainage, no ooze. 3 chest tubes insert sites no drainage no ooze, no crepitus.   Neurological:      General: No focal deficit present.      Mental Status: He is alert and oriented to person, place, and time.   Psychiatric:         Mood and Affect: Mood normal.       Medications  Scheduled medications  acetaminophen, 650 mg, oral, q6h  aspirin, 81 mg, oral, Daily  atorvastatin, 40 mg, oral, Daily  heparin (porcine), 5,000 Units, subcutaneous, q8h  insulin lispro, 0-5 Units, subcutaneous, TID with meals  iron polysaccharides, 150 mg, oral, Daily  isosorbide mononitrate ER, 30 mg, oral, Daily  magnesium oxide, 400 mg, oral, Daily  metoprolol tartrate, 12.5 mg, oral, BID  multivitamin with minerals, 1 tablet, oral, Daily  oxygen, , inhalation, Continuous - 02/gases  polyethylene glycol, 17 g, oral, BID  sennosides-docusate sodium, 2 tablet, oral, BID    Continuous medications   PRN medications  PRN medications: dextrose **OR** glucagon, oxyCODONE, oxygen    Labs  Results for orders placed or performed during the hospital encounter of 01/04/24 (from the past 24 hour(s))   POCT GLUCOSE   Result Value Ref Range    POCT Glucose 107 (H) 74 - 99 mg/dL   POCT GLUCOSE   Result Value Ref Range    POCT  Glucose 83 74 - 99 mg/dL   CBC   Result Value Ref Range    WBC 7.9 4.4 - 11.3 x10*3/uL    nRBC 0.0 0.0 - 0.0 /100 WBCs    RBC 3.47 (L) 4.50 - 5.90 x10*6/uL    Hemoglobin 10.0 (L) 13.5 - 17.5 g/dL    Hematocrit 30.1 (L) 41.0 - 52.0 %    MCV 87 80 - 100 fL    MCH 28.8 26.0 - 34.0 pg    MCHC 33.2 32.0 - 36.0 g/dL    RDW 12.7 11.5 - 14.5 %    Platelets 181 150 - 450 x10*3/uL   Renal Function Panel   Result Value Ref Range    Glucose 102 (H) 74 - 99 mg/dL    Sodium 138 136 - 145 mmol/L    Potassium 3.7 3.5 - 5.3 mmol/L    Chloride 102 98 - 107 mmol/L    Bicarbonate 25 21 - 32 mmol/L    Anion Gap 15 10 - 20 mmol/L    Urea Nitrogen 11 6 - 23 mg/dL    Creatinine 0.83 0.50 - 1.30 mg/dL    eGFR >90 >60 mL/min/1.73m*2    Calcium 8.8 8.6 - 10.6 mg/dL    Phosphorus 2.6 2.5 - 4.9 mg/dL    Albumin 3.9 3.4 - 5.0 g/dL   Magnesium   Result Value Ref Range    Magnesium 1.90 1.60 - 2.40 mg/dL   POCT GLUCOSE   Result Value Ref Range    POCT Glucose 104 (H) 74 - 99 mg/dL   POCT GLUCOSE   Result Value Ref Range    POCT Glucose 110 (H) 74 - 99 mg/dL         IMPRESSION & PLAN:  POD #3 s/p CABG x2 and posterior pericardial window  - Increase activity/ ambulation; PT/OT  - Encourage IS, C/DB; respiratory therapy; wean O2 as steve   - Cardiac rehab referral   - Continue cardiac meds: ASA, BB, and statin   - started Imdur 30mg  - will continue holding home losartan and rosuvastatin  - Pain and anticonstipation meds  - 1/6 left pleural chest tube removed  - 1/7 MS Chest tubes removed   - 2v CXR ordered    - Remove (pull) epicardial wires prior to discharge   - Tele until discharge  - Optimize nutrition and electrolytes    Rhythm  - Tele: SR with HR 70-80s  - Continue BB  - Adjust medications as tolerated  - Epicardial wires attached/pacer off     Acute Blood Loss Anemia   Recent Labs     01/07/24  0719 01/06/24  1048 01/05/24  0207 01/04/24  1427 12/22/23  0948 11/30/23  0838 10/19/23  0814   HGB 10.0* 9.7* 10.6* 10.8* 12.5* 13.1* 12.8*   HCT  30.1* 29.4* 32.2* 32.6* 38.4* 40.0* 40.6*     - MV, PO Iron x1mo  - Daily labs, transfuse as indicated    Thrombocytopenia  Recent Labs     01/07/24  0719 01/06/24  1048 01/05/24  0207 01/04/24  1427 12/22/23  0948 11/30/23  0838 10/19/23  0814    177 157 192 211 261 244     - Etiology likely postop/CPB related  - Continue to trend with daily CBCs    Volume/Electrolyte Status: Preop wt Weight: 93 kg (205 lb 0.4 oz)   Vitals:    01/07/24 0102   Weight: 92.9 kg (204 lb 12.8 oz)     - Weight: 92.9, 95.4 ( X, 93 kg)  - Euvolemic on exam   - Adjust diuresis as needed for postop cardiac surgery hypervolemia  - Replete electrolytes for hypokalemia/hypomagnesemia/hypophosphatemia as needed; K & Mg replaced 1/7  - 1/6 gave 2 gms magnesium sulfate and 15 mmol sodium phosphate  - Daily weights and strict I&Os  - Daily RFP while admitted    HLD  - c/w atorvastatin 40mg  - holding home rosuvastatin 20mg    HTN  - bp systolic 117-152  - increase metoprolol tartrate 25mg BID   - c/w isosorbide mononitrate 30mg by mouth daily   - holding home losartan 25mg    Celiac Disease  - c/w gluten free diet    GERD  - not on ppi home    VTE Prophylaxis: SCDs/TEDs, ambulation, SQ heparin  Code Status: Full Code    Dispo  - PT/OT recs skilled pt/ot 3 times  a week  - cardiac rehab order placed  - Would benefit from homecare for cardiac surgery carepath and RN visits  - Anticipate discharge 2-3 days, pending removal chest tubes- medical optimization  - Will continue to assess discharge needs    ULISES Márquez-CNS  Cardiac Surgery [facility]ERNA  [facility]  Team Phone [phone]    1/7/2024  3:49 PM

## 2024-01-08 ENCOUNTER — APPOINTMENT (OUTPATIENT)
Dept: RADIOLOGY | Facility: HOSPITAL | Age: 69
DRG: 236 | End: 2024-01-08
Payer: COMMERCIAL

## 2024-01-08 DIAGNOSIS — Z95.1 STATUS POST CORONARY ARTERY BYPASS GRAFTING: ICD-10-CM

## 2024-01-08 LAB
ALBUMIN SERPL BCP-MCNC: 3.6 G/DL (ref 3.4–5)
ANION GAP SERPL CALC-SCNC: 12 MMOL/L (ref 10–20)
BUN SERPL-MCNC: 10 MG/DL (ref 6–23)
CALCIUM SERPL-MCNC: 8.9 MG/DL (ref 8.6–10.6)
CHLORIDE SERPL-SCNC: 104 MMOL/L (ref 98–107)
CO2 SERPL-SCNC: 27 MMOL/L (ref 21–32)
CREAT SERPL-MCNC: 0.78 MG/DL (ref 0.5–1.3)
EGFRCR SERPLBLD CKD-EPI 2021: >90 ML/MIN/1.73M*2
ERYTHROCYTE [DISTWIDTH] IN BLOOD BY AUTOMATED COUNT: 12.8 % (ref 11.5–14.5)
GLUCOSE BLD MANUAL STRIP-MCNC: 104 MG/DL (ref 74–99)
GLUCOSE BLD MANUAL STRIP-MCNC: 126 MG/DL (ref 74–99)
GLUCOSE BLD MANUAL STRIP-MCNC: 130 MG/DL (ref 74–99)
GLUCOSE SERPL-MCNC: 100 MG/DL (ref 74–99)
HCT VFR BLD AUTO: 28 % (ref 41–52)
HGB BLD-MCNC: 9 G/DL (ref 13.5–17.5)
MAGNESIUM SERPL-MCNC: 1.78 MG/DL (ref 1.6–2.4)
MCH RBC QN AUTO: 28.6 PG (ref 26–34)
MCHC RBC AUTO-ENTMCNC: 32.1 G/DL (ref 32–36)
MCV RBC AUTO: 89 FL (ref 80–100)
NRBC BLD-RTO: 0 /100 WBCS (ref 0–0)
PHOSPHATE SERPL-MCNC: 3.6 MG/DL (ref 2.5–4.9)
PLATELET # BLD AUTO: 187 X10*3/UL (ref 150–450)
POTASSIUM SERPL-SCNC: 3.7 MMOL/L (ref 3.5–5.3)
RBC # BLD AUTO: 3.15 X10*6/UL (ref 4.5–5.9)
SODIUM SERPL-SCNC: 139 MMOL/L (ref 136–145)
WBC # BLD AUTO: 5.8 X10*3/UL (ref 4.4–11.3)

## 2024-01-08 PROCEDURE — 71046 X-RAY EXAM CHEST 2 VIEWS: CPT

## 2024-01-08 PROCEDURE — 83735 ASSAY OF MAGNESIUM: CPT | Performed by: NURSE PRACTITIONER

## 2024-01-08 PROCEDURE — 2500000004 HC RX 250 GENERAL PHARMACY W/ HCPCS (ALT 636 FOR OP/ED): Performed by: NURSE PRACTITIONER

## 2024-01-08 PROCEDURE — 85027 COMPLETE CBC AUTOMATED: CPT | Performed by: NURSE PRACTITIONER

## 2024-01-08 PROCEDURE — 2500000001 HC RX 250 WO HCPCS SELF ADMINISTERED DRUGS (ALT 637 FOR MEDICARE OP): Performed by: NURSE PRACTITIONER

## 2024-01-08 PROCEDURE — 82947 ASSAY GLUCOSE BLOOD QUANT: CPT

## 2024-01-08 PROCEDURE — 99232 SBSQ HOSP IP/OBS MODERATE 35: CPT | Performed by: NURSE PRACTITIONER

## 2024-01-08 PROCEDURE — 80069 RENAL FUNCTION PANEL: CPT | Performed by: NURSE PRACTITIONER

## 2024-01-08 PROCEDURE — 2500000001 HC RX 250 WO HCPCS SELF ADMINISTERED DRUGS (ALT 637 FOR MEDICARE OP): Performed by: CLINICAL NURSE SPECIALIST

## 2024-01-08 PROCEDURE — 2500000004 HC RX 250 GENERAL PHARMACY W/ HCPCS (ALT 636 FOR OP/ED): Performed by: CLINICAL NURSE SPECIALIST

## 2024-01-08 PROCEDURE — 71046 X-RAY EXAM CHEST 2 VIEWS: CPT | Performed by: RADIOLOGY

## 2024-01-08 PROCEDURE — 2500000005 HC RX 250 GENERAL PHARMACY W/O HCPCS: Performed by: NURSE PRACTITIONER

## 2024-01-08 PROCEDURE — 1200000002 HC GENERAL ROOM WITH TELEMETRY DAILY

## 2024-01-08 RX ORDER — POTASSIUM CHLORIDE 20 MEQ/1
40 TABLET, EXTENDED RELEASE ORAL ONCE
Status: COMPLETED | OUTPATIENT
Start: 2024-01-08 | End: 2024-01-08

## 2024-01-08 RX ORDER — DOCUSATE SODIUM 100 MG/1
100 CAPSULE, LIQUID FILLED ORAL 2 TIMES DAILY PRN
COMMUNITY
Start: 2024-01-08

## 2024-01-08 RX ORDER — MULTIVIT-MIN/IRON FUM/FOLIC AC 7.5 MG-4
1 TABLET ORAL DAILY
COMMUNITY
Start: 2024-01-09 | End: 2025-01-08

## 2024-01-08 RX ORDER — OXYCODONE HYDROCHLORIDE 5 MG/1
5 TABLET ORAL EVERY 6 HOURS PRN
Status: DISCONTINUED | OUTPATIENT
Start: 2024-01-08 | End: 2024-01-09 | Stop reason: HOSPADM

## 2024-01-08 RX ORDER — POLYETHYLENE GLYCOL 3350 17 G/17G
17 POWDER, FOR SOLUTION ORAL DAILY PRN
COMMUNITY
Start: 2024-01-08

## 2024-01-08 RX ADMIN — ISOSORBIDE MONONITRATE 30 MG: 30 TABLET, EXTENDED RELEASE ORAL at 09:18

## 2024-01-08 RX ADMIN — METOPROLOL TARTRATE 25 MG: 25 TABLET, FILM COATED ORAL at 09:18

## 2024-01-08 RX ADMIN — ACETAMINOPHEN 650 MG: 325 TABLET ORAL at 01:07

## 2024-01-08 RX ADMIN — POLYSACCHARIDE-IRON COMPLEX 150 MG: 150 CAPSULE ORAL at 09:18

## 2024-01-08 RX ADMIN — ACETAMINOPHEN 650 MG: 325 TABLET ORAL at 20:30

## 2024-01-08 RX ADMIN — ATORVASTATIN CALCIUM 40 MG: 40 TABLET, FILM COATED ORAL at 09:18

## 2024-01-08 RX ADMIN — Medication 2 L/MIN: at 08:00

## 2024-01-08 RX ADMIN — HEPARIN SODIUM 5000 UNITS: 5000 INJECTION INTRAVENOUS; SUBCUTANEOUS at 01:07

## 2024-01-08 RX ADMIN — SENNOSIDES AND DOCUSATE SODIUM 2 TABLET: 8.6; 5 TABLET ORAL at 20:52

## 2024-01-08 RX ADMIN — MAGNESIUM OXIDE TAB 400 MG (241.3 MG ELEMENTAL MG) 400 MG: 400 (241.3 MG) TAB at 09:18

## 2024-01-08 RX ADMIN — HEPARIN SODIUM 5000 UNITS: 5000 INJECTION INTRAVENOUS; SUBCUTANEOUS at 17:40

## 2024-01-08 RX ADMIN — POTASSIUM CHLORIDE 40 MEQ: 1500 TABLET, EXTENDED RELEASE ORAL at 14:30

## 2024-01-08 RX ADMIN — OXYCODONE HYDROCHLORIDE 5 MG: 5 TABLET ORAL at 01:07

## 2024-01-08 RX ADMIN — Medication 1 TABLET: at 09:18

## 2024-01-08 RX ADMIN — HEPARIN SODIUM 5000 UNITS: 5000 INJECTION INTRAVENOUS; SUBCUTANEOUS at 09:18

## 2024-01-08 RX ADMIN — ACETAMINOPHEN 650 MG: 325 TABLET ORAL at 14:30

## 2024-01-08 RX ADMIN — ACETAMINOPHEN 650 MG: 325 TABLET ORAL at 05:30

## 2024-01-08 RX ADMIN — ASPIRIN 81 MG CHEWABLE TABLET 81 MG: 81 TABLET CHEWABLE at 09:17

## 2024-01-08 RX ADMIN — METOPROLOL TARTRATE 25 MG: 25 TABLET, FILM COATED ORAL at 20:52

## 2024-01-08 ASSESSMENT — PAIN - FUNCTIONAL ASSESSMENT
PAIN_FUNCTIONAL_ASSESSMENT: 0-10

## 2024-01-08 ASSESSMENT — PAIN SCALES - GENERAL
PAINLEVEL_OUTOF10: 3
PAINLEVEL_OUTOF10: 2
PAINLEVEL_OUTOF10: 3
PAINLEVEL_OUTOF10: 0 - NO PAIN
PAINLEVEL_OUTOF10: 2

## 2024-01-08 ASSESSMENT — PAIN DESCRIPTION - LOCATION
LOCATION: INCISION
LOCATION: INCISION

## 2024-01-08 ASSESSMENT — PAIN DESCRIPTION - DESCRIPTORS: DESCRIPTORS: ACHING;PRESSURE;RADIATING

## 2024-01-08 NOTE — SIGNIFICANT EVENT
Ventricular epicardial wires pulled without difficulty.  Patient tolerated well. Bedrest for 1 hour with VS as per pacer wire removal guidelines.

## 2024-01-08 NOTE — PROGRESS NOTES
CARDIAC SURGERY DAILY PROGRESS NOTE    Jones Howell is a 68 y.o. male, with a PMH of CAD, HLD, Celiac Disease, cataracts, lymphocitic colitis, GERD, and sciatica s/p prior lumbar spinal surgery, who presented to [facility] on 1/4/2024 for coronary artery disease.  Patient underwent calcium scoring which had a coronary calcium score of greater than 700.  He had an echocardiogram which showed mild to moderate decrease in LV function, particularly with decreased contractility of the anterior wall.  Coronary angiography demonstrated high-grade stenosis of his LAD, right coronary artery, and posterior descending artery.  He had mild disease of his diagonal and circumflex coronary artery system.  Patient referred for coronary artery bypass grafting.      OPERATION/PROCEDURE: Median Sternotomy, Coronary Artery BYpass Grafting x 2 with Left Internal Thoracic Artery and Radial, posterior pericardiotomy  with Juan A Jimenez MD on 1/4/2024    CTICU Course: Uncomplicated. Extubated w/o difficulty postop on 1/4 and weaned to room air. Acute pain consulted now s/p bilateral SAP blockers.     Transferred to  on 1/5/2024 on POD 1; No acute event overnight. NSR on telemetry with back up vvi 50 pacer wires.     Interval History:   Overnight uneventful    SUBJECTIVE:  Walking in halls, denies complaints      Objective   /63 (BP Location: Right arm, Patient Position: Lying)   Pulse 74   Temp 36.3 °C (97.3 °F) (Temporal)   Resp 18   Ht 1.829 m (6')   Wt 91.3 kg (201 lb 4.8 oz)   SpO2 98%   BMI 27.30 kg/m²   Pain Score: 3   3 Day Weight Change: Unable to Calculate    Intake and Output    Intake/Output Summary (Last 24 hours) at 1/8/2024 1441  Last data filed at 1/8/2024 1353  Gross per 24 hour   Intake 1530 ml   Output 1000 ml   Net 530 ml       Physical Exam  Physical Exam  Constitutional:       General: He is not in acute distress.  HENT:      Head: Normocephalic.      Mouth/Throat:      Mouth:  Mucous membranes are moist.   Eyes:      Conjunctiva/sclera: Conjunctivae normal.   Cardiovascular:      Rate and Rhythm: Normal rate and regular rhythm.      Heart sounds: Normal heart sounds.      Comments: Epicardial wires removed 1/8  Pulmonary:      Breath sounds: Normal breath sounds.   Abdominal:      General: Bowel sounds are normal.      Palpations: Abdomen is soft.      Comments: + post op BM    Genitourinary:     Comments: Voiding; denies difficulty   Skin:     General: Skin is warm and dry.      Findings: Erythema present.      Comments: Midline chest incision well approximated, no erythema, no drainage, ROSALINDA   Neurological:      General: No focal deficit present.      Mental Status: He is alert and oriented to person, place, and time.   Psychiatric:         Mood and Affect: Mood normal.       Medications  Scheduled medications  acetaminophen, 650 mg, oral, q6h  aspirin, 81 mg, oral, Daily  atorvastatin, 40 mg, oral, Daily  heparin (porcine), 5,000 Units, subcutaneous, q8h  insulin lispro, 0-5 Units, subcutaneous, TID with meals  iron polysaccharides, 150 mg, oral, Daily  isosorbide mononitrate ER, 30 mg, oral, Daily  magnesium oxide, 400 mg, oral, Daily  metoprolol tartrate, 25 mg, oral, BID  multivitamin with minerals, 1 tablet, oral, Daily  oxygen, , inhalation, Continuous - 02/gases  polyethylene glycol, 17 g, oral, BID  sennosides-docusate sodium, 2 tablet, oral, BID    Continuous medications   PRN medications  PRN medications: dextrose **OR** glucagon, oxyCODONE, oxygen    Labs  Results for orders placed or performed during the hospital encounter of 01/04/24 (from the past 24 hour(s))   POCT GLUCOSE   Result Value Ref Range    POCT Glucose 98 74 - 99 mg/dL   POCT GLUCOSE   Result Value Ref Range    POCT Glucose 119 (H) 74 - 99 mg/dL   POCT GLUCOSE   Result Value Ref Range    POCT Glucose 126 (H) 74 - 99 mg/dL   CBC   Result Value Ref Range    WBC 5.8 4.4 - 11.3 x10*3/uL    nRBC 0.0 0.0 - 0.0 /100  WBCs    RBC 3.15 (L) 4.50 - 5.90 x10*6/uL    Hemoglobin 9.0 (L) 13.5 - 17.5 g/dL    Hematocrit 28.0 (L) 41.0 - 52.0 %    MCV 89 80 - 100 fL    MCH 28.6 26.0 - 34.0 pg    MCHC 32.1 32.0 - 36.0 g/dL    RDW 12.8 11.5 - 14.5 %    Platelets 187 150 - 450 x10*3/uL   Renal Function Panel   Result Value Ref Range    Glucose 100 (H) 74 - 99 mg/dL    Sodium 139 136 - 145 mmol/L    Potassium 3.7 3.5 - 5.3 mmol/L    Chloride 104 98 - 107 mmol/L    Bicarbonate 27 21 - 32 mmol/L    Anion Gap 12 10 - 20 mmol/L    Urea Nitrogen 10 6 - 23 mg/dL    Creatinine 0.78 0.50 - 1.30 mg/dL    eGFR >90 >60 mL/min/1.73m*2    Calcium 8.9 8.6 - 10.6 mg/dL    Phosphorus 3.6 2.5 - 4.9 mg/dL    Albumin 3.6 3.4 - 5.0 g/dL   Magnesium   Result Value Ref Range    Magnesium 1.78 1.60 - 2.40 mg/dL   POCT GLUCOSE   Result Value Ref Range    POCT Glucose 104 (H) 74 - 99 mg/dL                 XR chest 2 views 01/08/2024  FINDINGS:    CARDIOMEDIASTINAL SILHOUETTE:  Patient is status post median sternotomy. Retrosternal lucency may  represent a small amount of residual postoperative pneumomediastinum  or pneumothorax.    LUNGS:  Postoperative atelectasis and effusions. There is a small left apical  pneumothorax.    ABDOMEN:  No remarkable upper abdominal findings.    BONES:  No acute osseous changes.    Impression  1.  Postoperative atelectasis and effusions status post median  sternotomy and CABG.  2. Stable small left apical pneumothorax.      Signed by: Juan Miguel Chandra 1/8/2024 10:33 AM  Dictation workstation:   [id number]              IMPRESSION & PLAN:  POD #4 s/p CABG x2 and posterior pericardial window  - Increase activity/ ambulation; PT/OT  - Encourage IS, C/DB; respiratory therapy; wean O2 as steve   - Cardiac rehab referral   - Continue cardiac meds: ASA, BB, and statin   - continue Imdur x1 month for radial artery graft   - will continue holding home losartan and rosuvastatin  - Pain and anticonstipation meds  - 1/6 left pleural chest tube  removed  -  MS Chest tubes removed   - 2v CXR done     - Removed epicardial wires   - Tele until discharge  - Optimize nutrition and electrolytes    Rhythm  - Tele: SR with HR 70-80s  - Continue BB  - Adjust medications as tolerated    Acute Blood Loss Anemia   Recent Labs     24  1043 24  0719 24  1048 24  0207 24  1427 23  0948 23  0838   HGB 9.0* 10.0* 9.7* 10.6* 10.8* 12.5* 13.1*   HCT 28.0* 30.1* 29.4* 32.2* 32.6* 38.4* 40.0*     - MV, PO Iron x1mo  - Daily labs, transfuse as indicated    Thrombocytopenia  Recent Labs     24  1043 24  0719 24  1048 24  0207 24  1427 23  0948 23  0838    181 177 157 192 211 261     - Etiology likely postop/CPB related  - Continue to trend with daily CBCs    Volume/Electrolyte Status: Preop wt Weight: 93 kg (205 lb 0.4 oz)   Vitals:    24 0100   Weight: 91.3 kg (201 lb 4.8 oz)   - Weight: 91.3, 92.9, 95.4 ( X, 93 kg)  - Euvolemic on exam   - Adjust diuresis as needed for postop cardiac surgery hypervolemia  - Replete electrolytes for hypokalemia/hypomagnesemia/hypophosphatemia as needed; K & Mg replaced , K replaced   -  gave 2 gms magnesium sulfate and 15 mmol sodium phosphate  - Daily weights and strict I&Os  - Daily RFP while admitted    Hyperlipidemia: home rosuvastatin 20mg  Lab Results   Component Value Date    CHOL 210 (H) 10/19/2023    HDL 64.5 10/19/2023    VLDL 16 10/19/2023    TRIG 81 10/19/2023    NHDL 146 10/19/2023   - continue atorvastatin 40mg daily  - follow up lipid panel with PCP/ cardiologist for ongoing lipid management    HTN  Systolic (24hrs), Av , Min:104 , Max:124    - increase metoprolol tartrate 25mg BID   - c/w isosorbide mononitrate 30mg by mouth daily   - holding home losartan 25mg    Celiac Disease  - c/w gluten free diet    GERD  - not on ppi home    VTE Prophylaxis: SCDs/TEDs, ambulation, SQ heparin  Code Status: Full  Code    Dispo  - PT/OT recs home   - cardiac rehab order placed  - Would benefit from homecare for cardiac surgery carepath and RN visits  - Anticipate discharge tomorrow  - Will continue to assess discharge needs    ULISES Blake-[facility]  Cardiac Surgery [facility]ERNA  [facility]  Team Phone [phone]    1/8/2024  2:41 PM

## 2024-01-08 NOTE — DISCHARGE INSTRUCTIONS
Don't forget to “Keep Your Move in the Tube!!”     Please refer to the “Move in the Tube” handout.  -- Load bearing activities can be completed if you are “staying in the tube.” If you are attempting load bearing activities, let pain be your guide with when trying an activity “out of the tube”. If an activity hurts or is uncomfortable go back to doing it while you stay “in the tube”. There is no time limit to “stay in the tube”.     -- Non-load bearing activities, which are your activities of daily living, can be completed with your arms “out of the tube” as long as you remain pain free. Some activities of daily living examples are dressing, personal care, showering, washing hair, and toilet hygiene.     Don't forget to KEEP YOUR MOVE IN THE TUBE and think of a T. Carlos dinosaur!            Additional Post-Operative Instructions:  - Remember to use your Incentive spirometer 10x/hr while awake. Remember to cough and deep breath.  - No NSAIDs (common over-the-counter NSAIDs are ibuprofen/Motrin/Advil, naproxen/Naprosyn/Aleve) for 3 months after cardiac surgery; if NSAIDs needed after 3 months, clear use with cardiologist before starting.       Your home medications may have changed after surgery. Carefully compare this list with your prescription bottles at home and set aside any medications you are told to not take so you do not confuse them. Do not dispose of any medications until your follow-up, since your doctors may restart some at your follow-up appointments. It is important to bring a complete, current list of your medications to any medical appointments or hospitalizations.    For any questions about your discharge, please call the cardiac surgery office at [phone]

## 2024-01-08 NOTE — CARE PLAN
Problem: Pain - Adult  Goal: Verbalizes/displays adequate comfort level or baseline comfort level  Outcome: Progressing     Problem: Safety - Adult  Goal: Free from fall injury  Outcome: Progressing     Problem: Discharge Planning  Goal: Discharge to home or other facility with appropriate resources  Outcome: Progressing     Problem: Chronic Conditions and Co-morbidities  Goal: Patient's chronic conditions and co-morbidity symptoms are monitored and maintained or improved  Outcome: Progressing     Problem: Pain  Goal: Takes deep breaths with improved pain control throughout the shift  Outcome: Progressing  Goal: Turns in bed with improved pain control throughout the shift  Outcome: Progressing  Goal: Walks with improved pain control throughout the shift  Outcome: Progressing  Goal: Performs ADL's with improved pain control throughout shift  Outcome: Progressing  Goal: Participates in PT with improved pain control throughout the shift  Outcome: Progressing  Goal: Free from opioid side effects throughout the shift  Outcome: Progressing  Goal: Free from acute confusion related to pain meds throughout the shift  Outcome: Progressing   The patient's goals for the shift include      The clinical goals for the shift include Pt to be free of pain throughout shift

## 2024-01-09 ENCOUNTER — HOME HEALTH ADMISSION (OUTPATIENT)
Dept: HOME HEALTH SERVICES | Facility: HOME HEALTH | Age: 69
End: 2024-01-09
Payer: COMMERCIAL

## 2024-01-09 VITALS
WEIGHT: 202.4 LBS | HEART RATE: 75 BPM | TEMPERATURE: 97.3 F | OXYGEN SATURATION: 96 % | BODY MASS INDEX: 27.41 KG/M2 | HEIGHT: 72 IN | RESPIRATION RATE: 18 BRPM | SYSTOLIC BLOOD PRESSURE: 112 MMHG | DIASTOLIC BLOOD PRESSURE: 69 MMHG

## 2024-01-09 PROBLEM — I25.119 CORONARY ARTERY DISEASE WITH ANGINA PECTORIS (CMS-HCC): Status: RESOLVED | Noted: 2023-11-14 | Resolved: 2024-01-09

## 2024-01-09 PROBLEM — I25.118 CORONARY ARTERY DISEASE OF NATIVE ARTERY OF NATIVE HEART WITH STABLE ANGINA PECTORIS (CMS-HCC): Status: RESOLVED | Noted: 2023-12-07 | Resolved: 2024-01-09

## 2024-01-09 PROBLEM — R07.89 ATYPICAL CHEST PAIN: Status: RESOLVED | Noted: 2023-10-11 | Resolved: 2024-01-09

## 2024-01-09 PROBLEM — I25.10 2-VESSEL CORONARY ARTERY DISEASE: Status: RESOLVED | Noted: 2024-01-04 | Resolved: 2024-01-09

## 2024-01-09 LAB
ALBUMIN SERPL BCP-MCNC: 4.1 G/DL (ref 3.4–5)
ANION GAP SERPL CALC-SCNC: 13 MMOL/L (ref 10–20)
BUN SERPL-MCNC: 9 MG/DL (ref 6–23)
CALCIUM SERPL-MCNC: 9.5 MG/DL (ref 8.6–10.6)
CHLORIDE SERPL-SCNC: 101 MMOL/L (ref 98–107)
CO2 SERPL-SCNC: 29 MMOL/L (ref 21–32)
CREAT SERPL-MCNC: 0.94 MG/DL (ref 0.5–1.3)
EGFRCR SERPLBLD CKD-EPI 2021: 89 ML/MIN/1.73M*2
ERYTHROCYTE [DISTWIDTH] IN BLOOD BY AUTOMATED COUNT: 12.9 % (ref 11.5–14.5)
GLUCOSE SERPL-MCNC: 105 MG/DL (ref 74–99)
HCT VFR BLD AUTO: 31.3 % (ref 41–52)
HGB BLD-MCNC: 10.1 G/DL (ref 13.5–17.5)
MAGNESIUM SERPL-MCNC: 1.95 MG/DL (ref 1.6–2.4)
MCH RBC QN AUTO: 28.4 PG (ref 26–34)
MCHC RBC AUTO-ENTMCNC: 32.3 G/DL (ref 32–36)
MCV RBC AUTO: 88 FL (ref 80–100)
NRBC BLD-RTO: 0 /100 WBCS (ref 0–0)
PHOSPHATE SERPL-MCNC: 3.8 MG/DL (ref 2.5–4.9)
PLATELET # BLD AUTO: 264 X10*3/UL (ref 150–450)
POTASSIUM SERPL-SCNC: 3.9 MMOL/L (ref 3.5–5.3)
RBC # BLD AUTO: 3.56 X10*6/UL (ref 4.5–5.9)
SODIUM SERPL-SCNC: 139 MMOL/L (ref 136–145)
WBC # BLD AUTO: 7.5 X10*3/UL (ref 4.4–11.3)

## 2024-01-09 PROCEDURE — 36415 COLL VENOUS BLD VENIPUNCTURE: CPT | Performed by: NURSE PRACTITIONER

## 2024-01-09 PROCEDURE — 2500000001 HC RX 250 WO HCPCS SELF ADMINISTERED DRUGS (ALT 637 FOR MEDICARE OP): Performed by: CLINICAL NURSE SPECIALIST

## 2024-01-09 PROCEDURE — 80069 RENAL FUNCTION PANEL: CPT | Performed by: NURSE PRACTITIONER

## 2024-01-09 PROCEDURE — 2500000001 HC RX 250 WO HCPCS SELF ADMINISTERED DRUGS (ALT 637 FOR MEDICARE OP): Performed by: NURSE PRACTITIONER

## 2024-01-09 PROCEDURE — 99232 SBSQ HOSP IP/OBS MODERATE 35: CPT | Performed by: NURSE PRACTITIONER

## 2024-01-09 PROCEDURE — 85027 COMPLETE CBC AUTOMATED: CPT | Performed by: NURSE PRACTITIONER

## 2024-01-09 PROCEDURE — 2500000004 HC RX 250 GENERAL PHARMACY W/ HCPCS (ALT 636 FOR OP/ED): Performed by: CLINICAL NURSE SPECIALIST

## 2024-01-09 PROCEDURE — 83735 ASSAY OF MAGNESIUM: CPT | Performed by: NURSE PRACTITIONER

## 2024-01-09 PROCEDURE — 2500000004 HC RX 250 GENERAL PHARMACY W/ HCPCS (ALT 636 FOR OP/ED): Performed by: NURSE PRACTITIONER

## 2024-01-09 RX ORDER — OXYCODONE AND ACETAMINOPHEN 5; 325 MG/1; MG/1
1 TABLET ORAL EVERY 6 HOURS PRN
Qty: 5 TABLET | Refills: 0 | Status: SHIPPED | OUTPATIENT
Start: 2024-01-09 | End: 2024-01-12

## 2024-01-09 RX ORDER — ISOSORBIDE MONONITRATE 30 MG/1
30 TABLET, EXTENDED RELEASE ORAL DAILY
Qty: 30 TABLET | Refills: 0 | Status: SHIPPED | OUTPATIENT
Start: 2024-01-09 | End: 2024-02-01 | Stop reason: SDUPTHER

## 2024-01-09 RX ORDER — ACETAMINOPHEN 325 MG/1
650 TABLET ORAL EVERY 6 HOURS
Qty: 240 TABLET | Refills: 0 | COMMUNITY
Start: 2024-01-09 | End: 2024-02-08

## 2024-01-09 RX ORDER — IRON POLYSACCHARIDE COMPLEX 150 MG
150 CAPSULE ORAL DAILY
Qty: 30 CAPSULE | Refills: 0 | Status: SHIPPED | OUTPATIENT
Start: 2024-01-09 | End: 2024-02-08

## 2024-01-09 RX ORDER — METOPROLOL TARTRATE 25 MG/1
25 TABLET, FILM COATED ORAL 2 TIMES DAILY
Qty: 60 TABLET | Refills: 0 | Status: SHIPPED | OUTPATIENT
Start: 2024-01-09 | End: 2024-01-11 | Stop reason: DRUGHIGH

## 2024-01-09 RX ADMIN — ACETAMINOPHEN 650 MG: 325 TABLET ORAL at 08:46

## 2024-01-09 RX ADMIN — SENNOSIDES AND DOCUSATE SODIUM 2 TABLET: 8.6; 5 TABLET ORAL at 08:46

## 2024-01-09 RX ADMIN — ISOSORBIDE MONONITRATE 30 MG: 30 TABLET, EXTENDED RELEASE ORAL at 08:47

## 2024-01-09 RX ADMIN — METOPROLOL TARTRATE 25 MG: 25 TABLET, FILM COATED ORAL at 08:46

## 2024-01-09 RX ADMIN — POLYSACCHARIDE-IRON COMPLEX 150 MG: 150 CAPSULE ORAL at 08:47

## 2024-01-09 RX ADMIN — HEPARIN SODIUM 5000 UNITS: 5000 INJECTION INTRAVENOUS; SUBCUTANEOUS at 08:46

## 2024-01-09 RX ADMIN — ACETAMINOPHEN 650 MG: 325 TABLET ORAL at 01:00

## 2024-01-09 RX ADMIN — Medication 1 TABLET: at 08:46

## 2024-01-09 RX ADMIN — ATORVASTATIN CALCIUM 40 MG: 40 TABLET, FILM COATED ORAL at 08:46

## 2024-01-09 RX ADMIN — MAGNESIUM OXIDE TAB 400 MG (241.3 MG ELEMENTAL MG) 400 MG: 400 (241.3 MG) TAB at 08:47

## 2024-01-09 RX ADMIN — HEPARIN SODIUM 5000 UNITS: 5000 INJECTION INTRAVENOUS; SUBCUTANEOUS at 01:00

## 2024-01-09 RX ADMIN — ASPIRIN 81 MG CHEWABLE TABLET 81 MG: 81 TABLET CHEWABLE at 08:46

## 2024-01-09 NOTE — DISCHARGE SUMMARY
Discharge Diagnosis  Coronary artery disease of native artery of native heart with stable angina pectoris (CMS/HCC)    Issues Requiring Follow-Up      Test Results Pending At Discharge  Pending Labs       Order Current Status    CBC In process    Magnesium In process    Renal Function Panel In process            Hospital Course  This is a 68 year old male with past medical history of cataracts, celiac disease, CAD, HLD, lymphocytic colitis, GERD, and sciatica. He arrives to the CTICU s/p CABG x 2 (LIMA->LAD, RA->PDA) and posterior pericardial window.    Operation Procedure 1/4/24 Dr Jimenez  1. Median sternotomy  2. Central cannulation  3. Endoscopic harvest of left radial artery  4. CABG x 2 (LIMA->LAD, RA->PDA)  5. Posterior pericardial window     CTICU  Transfer to T3 1/5/23  ===================    Floor Course:  - Patient was diuresed for fluid volume overload post cardiac surgery; Preop weight: Weight: 93 kg (205 lb 0.4 oz)kg  /69   Pulse 75   Temp 36.3 °C (97.3 °F) (Temporal)   Resp 18   Ht 1.829 m (6')   Wt 91.8 kg (202 lb 6.4 oz)   SpO2 95%   BMI 27.45 kg/m²   - On ASA, statin, BB, continue imdur x1 month for radial arterial graft  by discharge  - Epicardial wires pulled on 1/8  - telemetry at discharge NSR 73-88  - 2v CXR done 2/18  - Cardiac rehab referral was placed  - PT recs low intensity therapy  - Anticipate discharge to home with homecare    Discharged on 1/9    On day of discharge, vital signs were stable and no acute distress was noted. All questions were answered. After VS and labs were reviewed it was determined the patient was stable for discharge.   ======================    Past Medical History:  Diagnosis Date  · Cataract    · Celiac disease    · Colitis    · Coronary artery disease 12/04/2023    Severe 2 vessel CAD in a right dominant system.  Elevated LVEDP.  No evidence of aortic stenosis.  · Hyperlipidemia    · Lymphocytic colitis         Surgical History  · CARDIAC  CATHETERIZATION N/A 12/04/2023    Procedure: Left Heart Cath, With LV;  Surgeon: Javier Ma MD;  Location: [facility] Cardiac Cath Lab;  Service: Cardiovascular;  Laterality: N/A;  · LUMBAR SPINE SURGERY           Prescriptions Prior to Admission  · aspirin 81 mg chewable tablet Chew 1 tablet (81 mg) once daily. 90 tablet 3 1/4/2024  · b complex 0.4 mg tablet Take 1 tablet by mouth once daily.     Past Month  · cholecalciferol (Vitamin D3) 50 MCG (2000 UT) tablet Take 1 tablet (50 mcg) by mouth once daily.     Past Month  · ibuprofen 800 mg tablet Take 600 mg by mouth every 8 hours if needed for mild pain (1 - 3) (soreness after exercising).     Past Month at >2 weeks  · losartan (Cozaar) 25 mg tablet Take 1 tablet (25 mg) by mouth once daily. 30 tablet 11 1/2/2024  · nitroglycerin (Nitrostat) 0.4 mg SL tablet Place 1 tablet (0.4 mg) under the tongue every 5 minutes if needed for chest pain. May repeat dose every 5 minutes for up to 3 doses total. 100 tablet 11    · rosuvastatin (Crestor) 20 mg tablet Take 1 tablet (20 mg) by mouth once daily. 90 tablet 3 1/3/2024       Fentanyl and Lactose  Social History     Tobacco Use  · Smoking status: Never  · Smokeless tobacco: Never  Substance Use Topics  · Alcohol use: Yes      Comment: SOCIALLY  · Drug use: Never     Family History  · Alzheimer's disease Mother   80  · Other (COVID) Father   9        Cardiac Testing:      TTE: 11/13/23  1. Left ventricular systolic function is mildly decreased.  2. Entire anterior septum, apical anterior segment, and apex are abnormal.  3. Technically difficult despite the use of echocontrast. (Note apical echocontrast images were foreshortened.) The LV apex does appear trabeculated. Note noncontrast images suggestive of LAD territory wall motion abnormality. Also possible inferior hypokinesis. Overall likely mild regional LV systolic dysfunction.  4. Compared with the prior exam from 11/8/2023, today's limited study did include the use of  echocontrast, however these images were somewhat foreshortened. Overall likely LAD territory wall motion abnormality with possibly mild regional LV systolic dysfunction.     [facility]: 11/14/23  1. Severe 2 vessel CAD in a right dominant system.  2. Elevated LVEDP.  3. No evidence of aortic stenosis.       Pertinent Physical Exam At Time of Discharge  Physical Exam  See daily note   Home Medications     Medication List      START taking these medications     acetaminophen 325 mg tablet; Commonly known as: Tylenol; Take 2 tablets   (650 mg) by mouth every 6 hours.   docusate sodium 100 mg capsule; Commonly known as: Colace; Take 1   capsule (100 mg) by mouth 2 times a day as needed for constipation.   iron polysaccharides 150 mg iron capsule; Commonly known as:   Nu-Iron,Niferex; Take 1 capsule (150 mg) by mouth once daily.   isosorbide mononitrate ER 30 mg 24 hr tablet; Commonly known as: Imdur;   Take 1 tablet (30 mg) by mouth once daily. Do not crush or chew. For 1   month for radial graft   metoprolol tartrate 25 mg tablet; Commonly known as: Lopressor; Take 1   tablet (25 mg) by mouth 2 times a day.   multivitamin with minerals tablet; Take 1 tablet by mouth once daily. Do   not start before January 9, 2024.   oxyCODONE-acetaminophen 5-325 mg tablet; Commonly known as: Percocet;   Take 1 tablet by mouth every 6 hours if needed for severe pain (7 - 10)   for up to 3 days.   polyethylene glycol 17 gram packet; Commonly known as: Glycolax,   Miralax; Take 17 g by mouth once daily as needed (constipation).     CONTINUE taking these medications     aspirin 81 mg chewable tablet; Chew 1 tablet (81 mg) once daily.   b complex 0.4 mg tablet   losartan 25 mg tablet; Commonly known as: Cozaar; Take 1 tablet (25 mg)   by mouth once daily.   rosuvastatin 20 mg tablet; Commonly known as: Crestor; Take 1 tablet (20   mg) by mouth once daily.   Vitamin D3 50 MCG (2000 UT) tablet; Generic drug: cholecalciferol     STOP taking these  medications     ibuprofen 800 mg tablet   nitroglycerin 0.4 mg SL tablet; Commonly known as: Nitrostat       Outpatient Follow-Up  Future Appointments   Date Time Provider Department Center   1/22/2024  1:20 PM CARDSLOUIE [facility] [id number] NURSE [id number] [facility]   2/1/2024  2:40 PM Juan A Jimenez MD [id number] [facility]   8/22/2024  8:40 AM Shey Nguyễn DO [id number] [facility]   11/18/2024  8:15 AM Parth Berger MD [id number] [facility]       Stephenie Khan, APRN-CNP

## 2024-01-09 NOTE — HOSPITAL COURSE
This is a 68 year old male with past medical history of cataracts, celiac disease, CAD, HLD, lymphocytic colitis, GERD, and sciatica. He arrives to the CTICU s/p CABG x 2 (LIMA->LAD, RA->PDA) and posterior pericardial window.    Operation Procedure 1/4/24 Dr Jimenez  1. Median sternotomy  2. Central cannulation  3. Endoscopic harvest of left radial artery  4. CABG x 2 (LIMA->LAD, RA->PDA)  5. Posterior pericardial window     CTICU  Transfer to T3 1/5/23  ===================    Floor Course:  - Patient was diuresed for fluid volume overload post cardiac surgery; Preop weight: Weight: 93 kg (205 lb 0.4 oz)kg  /69   Pulse 75   Temp 36.3 °C (97.3 °F) (Temporal)   Resp 18   Ht 1.829 m (6')   Wt 91.8 kg (202 lb 6.4 oz)   SpO2 95%   BMI 27.45 kg/m²   - On ASA, statin, BB, continue imdur x1 month for radial arterial graft  by discharge  - Epicardial wires pulled on 1/8  - telemetry at discharge NSR 73-88  - 2v CXR done 2/18  - Cardiac rehab referral was placed  - PT recs low intensity therapy  - Anticipate discharge to home with homecare    Discharged on 1/9    On day of discharge, vital signs were stable and no acute distress was noted. All questions were answered. After VS and labs were reviewed it was determined the patient was stable for discharge.   ======================    Past Medical History:  Diagnosis Date  · Cataract    · Celiac disease    · Colitis    · Coronary artery disease 12/04/2023    Severe 2 vessel CAD in a right dominant system.  Elevated LVEDP.  No evidence of aortic stenosis.  · Hyperlipidemia    · Lymphocytic colitis         Surgical History  · CARDIAC CATHETERIZATION N/A 12/04/2023    Procedure: Left Heart Cath, With LV;  Surgeon: Javier Ma MD;  Location: [facility] Cardiac Cath Lab;  Service: Cardiovascular;  Laterality: N/A;  · LUMBAR SPINE SURGERY           Prescriptions Prior to Admission  · aspirin 81 mg chewable tablet Chew 1 tablet (81 mg) once daily. 90 tablet 3 1/4/2024  · b  complex 0.4 mg tablet Take 1 tablet by mouth once daily.     Past Month  · cholecalciferol (Vitamin D3) 50 MCG (2000 UT) tablet Take 1 tablet (50 mcg) by mouth once daily.     Past Month  · ibuprofen 800 mg tablet Take 600 mg by mouth every 8 hours if needed for mild pain (1 - 3) (soreness after exercising).     Past Month at >2 weeks  · losartan (Cozaar) 25 mg tablet Take 1 tablet (25 mg) by mouth once daily. 30 tablet 11 1/2/2024  · nitroglycerin (Nitrostat) 0.4 mg SL tablet Place 1 tablet (0.4 mg) under the tongue every 5 minutes if needed for chest pain. May repeat dose every 5 minutes for up to 3 doses total. 100 tablet 11    · rosuvastatin (Crestor) 20 mg tablet Take 1 tablet (20 mg) by mouth once daily. 90 tablet 3 1/3/2024       Fentanyl and Lactose  Social History     Tobacco Use  · Smoking status: Never  · Smokeless tobacco: Never  Substance Use Topics  · Alcohol use: Yes      Comment: SOCIALLY  · Drug use: Never     Family History  · Alzheimer's disease Mother   80  · Other (COVID) Father   9        Cardiac Testing:      TTE: 11/13/23  1. Left ventricular systolic function is mildly decreased.  2. Entire anterior septum, apical anterior segment, and apex are abnormal.  3. Technically difficult despite the use of echocontrast. (Note apical echocontrast images were foreshortened.) The LV apex does appear trabeculated. Note noncontrast images suggestive of LAD territory wall motion abnormality. Also possible inferior hypokinesis. Overall likely mild regional LV systolic dysfunction.  4. Compared with the prior exam from 11/8/2023, today's limited study did include the use of echocontrast, however these images were somewhat foreshortened. Overall likely LAD territory wall motion abnormality with possibly mild regional LV systolic dysfunction.     [facility]: 11/14/23  1. Severe 2 vessel CAD in a right dominant system.  2. Elevated LVEDP.  3. No evidence of aortic stenosis.

## 2024-01-09 NOTE — PROGRESS NOTES
CARDIAC SURGERY DAILY PROGRESS NOTE    Jones Howell is a 68 y.o. male, with a PMH of CAD, HLD, Celiac Disease, cataracts, lymphocitic colitis, GERD, and sciatica s/p prior lumbar spinal surgery, who presented to [facility] on 1/4/2024 for coronary artery disease.  Patient underwent calcium scoring which had a coronary calcium score of greater than 700.  He had an echocardiogram which showed mild to moderate decrease in LV function, particularly with decreased contractility of the anterior wall.  Coronary angiography demonstrated high-grade stenosis of his LAD, right coronary artery, and posterior descending artery.  He had mild disease of his diagonal and circumflex coronary artery system.  Patient referred for coronary artery bypass grafting.      OPERATION/PROCEDURE: Median Sternotomy, Coronary Artery BYpass Grafting x 2 with Left Internal Thoracic Artery and Radial, posterior pericardiotomy  with Juan A Jimenez MD on 1/4/2024    CTICU Course: Uncomplicated. Extubated w/o difficulty postop on 1/4 and weaned to room air. Acute pain consulted now s/p bilateral SAP blockers.     Transferred to  on 1/5/2024 on POD 1; No acute event overnight. NSR on telemetry with back up vvi 50 pacer wires.     Interval History:   Overnight uneventful    SUBJECTIVE:  Walking in halls, denies complaints      Objective   /69   Pulse 75   Temp 36.3 °C (97.3 °F) (Temporal)   Resp 18   Ht 1.829 m (6')   Wt 91.8 kg (202 lb 6.4 oz)   SpO2 96%   BMI 27.45 kg/m²   Pain Score: 3   3 Day Weight Change: -1.194 kg (-2 lb 10.1 oz) per day    Heart Rate:  [74-88]   Temp:  [36.1 °C (97 °F)-36.3 °C (97.3 °F)]   Resp:  [18]   BP: (104-117)/(50-72)   Weight:  [91.8 kg (202 lb 6.4 oz)]   SpO2:  [93 %-98 %]       Intake and Output    Intake/Output Summary (Last 24 hours) at 1/9/2024 0951  Last data filed at 1/8/2024 1750  Gross per 24 hour   Intake 880 ml   Output 1200 ml   Net -320 ml     Physical Exam  Physical  Exam  Vitals and nursing note reviewed.   Constitutional:       General: He is not in acute distress.  HENT:      Head: Normocephalic.      Mouth/Throat:      Mouth: Mucous membranes are moist.   Eyes:      Conjunctiva/sclera: Conjunctivae normal.   Cardiovascular:      Rate and Rhythm: Normal rate and regular rhythm.      Heart sounds: Normal heart sounds.      Comments: Epicardial wires removed 1/8  Pulmonary:      Breath sounds: Normal breath sounds.   Abdominal:      General: Bowel sounds are normal.      Palpations: Abdomen is soft.      Comments: + post op BM    Genitourinary:     Comments: Voiding; denies difficulty   Skin:     General: Skin is warm and dry.      Findings: Erythema present.      Comments: Midline chest incision well approximated, no erythema, no drainage, ROSALINDA   Neurological:      General: No focal deficit present.      Mental Status: He is alert and oriented to person, place, and time.   Psychiatric:         Mood and Affect: Mood normal.       Medications  Scheduled medications  acetaminophen, 650 mg, oral, q6h  aspirin, 81 mg, oral, Daily  atorvastatin, 40 mg, oral, Daily  heparin (porcine), 5,000 Units, subcutaneous, q8h  iron polysaccharides, 150 mg, oral, Daily  isosorbide mononitrate ER, 30 mg, oral, Daily  magnesium oxide, 400 mg, oral, Daily  metoprolol tartrate, 25 mg, oral, BID  multivitamin with minerals, 1 tablet, oral, Daily  oxygen, , inhalation, Continuous - 02/gases  polyethylene glycol, 17 g, oral, BID  sennosides-docusate sodium, 2 tablet, oral, BID    Continuous medications   PRN medications  PRN medications: oxyCODONE, oxygen    Labs  Results for orders placed or performed during the hospital encounter of 01/04/24 (from the past 24 hour(s))   CBC   Result Value Ref Range    WBC 5.8 4.4 - 11.3 x10*3/uL    nRBC 0.0 0.0 - 0.0 /100 WBCs    RBC 3.15 (L) 4.50 - 5.90 x10*6/uL    Hemoglobin 9.0 (L) 13.5 - 17.5 g/dL    Hematocrit 28.0 (L) 41.0 - 52.0 %    MCV 89 80 - 100 fL    MCH  28.6 26.0 - 34.0 pg    MCHC 32.1 32.0 - 36.0 g/dL    RDW 12.8 11.5 - 14.5 %    Platelets 187 150 - 450 x10*3/uL   Renal Function Panel   Result Value Ref Range    Glucose 100 (H) 74 - 99 mg/dL    Sodium 139 136 - 145 mmol/L    Potassium 3.7 3.5 - 5.3 mmol/L    Chloride 104 98 - 107 mmol/L    Bicarbonate 27 21 - 32 mmol/L    Anion Gap 12 10 - 20 mmol/L    Urea Nitrogen 10 6 - 23 mg/dL    Creatinine 0.78 0.50 - 1.30 mg/dL    eGFR >90 >60 mL/min/1.73m*2    Calcium 8.9 8.6 - 10.6 mg/dL    Phosphorus 3.6 2.5 - 4.9 mg/dL    Albumin 3.6 3.4 - 5.0 g/dL   Magnesium   Result Value Ref Range    Magnesium 1.78 1.60 - 2.40 mg/dL   POCT GLUCOSE   Result Value Ref Range    POCT Glucose 104 (H) 74 - 99 mg/dL   POCT GLUCOSE   Result Value Ref Range    POCT Glucose 130 (H) 74 - 99 mg/dL       IMAGES  I have personally reviewed the following test result(s):    XR chest 2 views 01/08/2024  FINDINGS:    CARDIOMEDIASTINAL SILHOUETTE:  Patient is status post median sternotomy. Retrosternal lucency may  represent a small amount of residual postoperative pneumomediastinum  or pneumothorax.    LUNGS:  Postoperative atelectasis and effusions. There is a small left apical  pneumothorax.    ABDOMEN:  No remarkable upper abdominal findings.    BONES:  No acute osseous changes.    Impression  1.  Postoperative atelectasis and effusions status post median  sternotomy and CABG.  2. Stable small left apical pneumothorax.    Signed by: Juan Miguel Chandra 1/8/2024 10:33 AM  Dictation workstation:   [id number]     IMPRESSION & PLAN:  POD # 5 s/p CABG x2 and posterior pericardial window  - Increase activity/ ambulation; PT/OT  - Encourage IS, C/DB; respiratory therapy; wean O2 as steve   - Cardiac rehab referral   - Continue cardiac meds: ASA, BB, and statin   - continue Imdur x1 month for radial artery graft   - will continue holding home losartan and rosuvastatin  - Pain and anticonstipation meds  - 1/6 left pleural chest tube removed  - 1/7 MS Chest  tubes removed   - 2v CXR done     - Removed epicardial wires   - Tele until discharge  - Optimize nutrition and electrolytes    Rhythm  - Tele: SR with HR 70-80s  - Continue BB  - Adjust medications as tolerated    Acute Blood Loss Anemia   Recent Labs     24  1043 24  0719 24  1048 24  0207 24  1427 23  0948 23  0838   HGB 9.0* 10.0* 9.7* 10.6* 10.8* 12.5* 13.1*   HCT 28.0* 30.1* 29.4* 32.2* 32.6* 38.4* 40.0*   - MV, PO Iron x1mo  - Daily labs, transfuse as indicated    Thrombocytopenia  Recent Labs     24  1043 24  0719 24  1048 24  0207 24  1427 23  0948 23  0838    181 177 157 192 211 261   - Etiology likely postop/CPB related  - Continue to trend with daily CBCs    Volume/Electrolyte Status: Preop wt Weight: 93 kg (205 lb 0.4 oz)   Vitals:    24 0058   Weight: 91.8 kg (202 lb 6.4 oz)   - Weight: 91.3, 92.9, 95.4 ( X, 93 kg)  - Euvolemic on exam   - Adjust diuresis as needed for postop cardiac surgery hypervolemia  - Replete electrolytes for hypokalemia/hypomagnesemia/hypophosphatemia as needed; K & Mg replaced , K replaced   -  gave 2 gms magnesium sulfate and 15 mmol sodium phosphate  - Daily weights and strict I&Os  - Daily RFP while admitted    Hyperlipidemia: home rosuvastatin 20mg  Lab Results   Component Value Date    CHOL 210 (H) 10/19/2023    HDL 64.5 10/19/2023    VLDL 16 10/19/2023    TRIG 81 10/19/2023    NHDL 146 10/19/2023   - continue atorvastatin 40mg daily  - follow up lipid panel with PCP/ cardiologist for ongoing lipid management    HTN  Systolic (24hrs), Av , Min:104 , Max:117    - increase metoprolol tartrate 25mg BID   - c/w isosorbide mononitrate 30mg by mouth daily   - holding home losartan 25mg    Celiac Disease  - c/w gluten free diet    GERD  - not on ppi home  - Ranitidine as need    VTE Prophylaxis: SCDs/TEDs, ambulation, SQ heparin  Code Status: Full Code    Dispo  -  PT/OT recs home   - cardiac rehab order placed  - Would benefit from homecare for cardiac surgery carepath and RN visits  - Anticipate discharge tomorrow  - Will continue to assess discharge needs    ULISES Mortensen-CNP  Cardiac Surgery PRERNA  [facility]  Team Phone [phone]    1/9/2024  9:51 AM

## 2024-01-11 ENCOUNTER — TELEPHONE (OUTPATIENT)
Dept: CARDIOLOGY | Facility: HOSPITAL | Age: 69
End: 2024-01-11
Payer: COMMERCIAL

## 2024-01-11 DIAGNOSIS — I25.10 2-VESSEL CORONARY ARTERY DISEASE: ICD-10-CM

## 2024-01-11 DIAGNOSIS — Z95.1 S/P CABG (CORONARY ARTERY BYPASS GRAFT): ICD-10-CM

## 2024-01-11 RX ORDER — METOPROLOL TARTRATE 25 MG/1
12.5 TABLET, FILM COATED ORAL 2 TIMES DAILY
Qty: 30 TABLET | Refills: 0 | Status: SHIPPED | OUTPATIENT
Start: 2024-01-11 | End: 2024-02-16 | Stop reason: SDUPTHER

## 2024-01-11 NOTE — TELEPHONE ENCOUNTER
Notified that pt feeling dizzy today with low BP. Not feeling well. Prior to surgery had been on losartan 25 mg daily. Had CABG including radial artery graft, so is to remain on imdur at this time to prevent vasospasm of graft.  Was also on metoprolol tartrate 12.5 mg bid in hospital later increased to 25 mg bid and wa restarted on losartan upon discharge.    For now will have him hold the losartan and reduced the metoprolol to 12.5 mg bid. Will continue the imdur 30 mg daily. Will follow BP and HR and sx and adjust further as needed. HR currently 70 bpm.

## 2024-01-14 ENCOUNTER — HOME CARE VISIT (OUTPATIENT)
Dept: HOME HEALTH SERVICES | Facility: HOME HEALTH | Age: 69
End: 2024-01-14
Payer: COMMERCIAL

## 2024-01-14 VITALS
SYSTOLIC BLOOD PRESSURE: 110 MMHG | HEIGHT: 72 IN | TEMPERATURE: 97.8 F | OXYGEN SATURATION: 97 % | BODY MASS INDEX: 27.77 KG/M2 | WEIGHT: 205 LBS | RESPIRATION RATE: 18 BRPM | DIASTOLIC BLOOD PRESSURE: 64 MMHG | HEART RATE: 77 BPM

## 2024-01-14 PROCEDURE — G0299 HHS/HOSPICE OF RN EA 15 MIN: HCPCS

## 2024-01-14 PROCEDURE — 0023 HH SOC

## 2024-01-14 PROCEDURE — 169592 NO-PAY CLAIM PROCEDURE

## 2024-01-14 ASSESSMENT — ENCOUNTER SYMPTOMS
LOSS OF SENSATION IN FEET: 0
LAST BOWEL MOVEMENT: 66852
OCCASIONAL FEELINGS OF UNSTEADINESS: 0
DEPRESSION: 0
DENIES PAIN: 1
APPETITE LEVEL: GOOD

## 2024-01-14 ASSESSMENT — PAIN SCALES - PAIN ASSESSMENT IN ADVANCED DEMENTIA (PAINAD)
TOTALSCORE: 0
CONSOLABILITY: 0
BODYLANGUAGE: 0 - RELAXED.
NEGVOCALIZATION: 0
FACIALEXPRESSION: 0 - SMILING OR INEXPRESSIVE.
FACIALEXPRESSION: 0
BREATHING: 0
BODYLANGUAGE: 0
CONSOLABILITY: 0 - NO NEED TO CONSOLE.
NEGVOCALIZATION: 0 - NONE.

## 2024-01-14 ASSESSMENT — ACTIVITIES OF DAILY LIVING (ADL)
OASIS_M1830: 02
ENTERING_EXITING_HOME: SUPERVISION

## 2024-01-17 ENCOUNTER — ANCILLARY PROCEDURE (OUTPATIENT)
Dept: RADIOLOGY | Facility: CLINIC | Age: 69
End: 2024-01-17
Payer: COMMERCIAL

## 2024-01-17 ENCOUNTER — LAB (OUTPATIENT)
Dept: LAB | Facility: LAB | Age: 69
End: 2024-01-17
Payer: COMMERCIAL

## 2024-01-17 DIAGNOSIS — I25.119 CORONARY ARTERY DISEASE WITH ANGINA PECTORIS, UNSPECIFIED VESSEL OR LESION TYPE, UNSPECIFIED WHETHER NATIVE OR TRANSPLANTED HEART (CMS-HCC): ICD-10-CM

## 2024-01-17 DIAGNOSIS — Z95.1 STATUS POST CORONARY ARTERY BYPASS GRAFTING: ICD-10-CM

## 2024-01-17 DIAGNOSIS — Z95.1 S/P CABG (CORONARY ARTERY BYPASS GRAFT): ICD-10-CM

## 2024-01-17 DIAGNOSIS — I25.10 2-VESSEL CORONARY ARTERY DISEASE: ICD-10-CM

## 2024-01-17 LAB
ALBUMIN SERPL BCP-MCNC: 4.3 G/DL (ref 3.4–5)
ANION GAP SERPL CALC-SCNC: 16 MMOL/L (ref 10–20)
BUN SERPL-MCNC: 15 MG/DL (ref 6–23)
CALCIUM SERPL-MCNC: 9.3 MG/DL (ref 8.6–10.6)
CHLORIDE SERPL-SCNC: 103 MMOL/L (ref 98–107)
CHOLEST SERPL-MCNC: 126 MG/DL (ref 0–199)
CHOLESTEROL/HDL RATIO: 2.4
CO2 SERPL-SCNC: 27 MMOL/L (ref 21–32)
CREAT SERPL-MCNC: 0.92 MG/DL (ref 0.5–1.3)
EGFRCR SERPLBLD CKD-EPI 2021: >90 ML/MIN/1.73M*2
ERYTHROCYTE [DISTWIDTH] IN BLOOD BY AUTOMATED COUNT: 13.1 % (ref 11.5–14.5)
GLUCOSE SERPL-MCNC: 85 MG/DL (ref 74–99)
HCT VFR BLD AUTO: 31.1 % (ref 41–52)
HDLC SERPL-MCNC: 52.6 MG/DL
HGB BLD-MCNC: 10.1 G/DL (ref 13.5–17.5)
LDLC SERPL CALC-MCNC: 60 MG/DL
MAGNESIUM SERPL-MCNC: 2.04 MG/DL (ref 1.6–2.4)
MCH RBC QN AUTO: 28.8 PG (ref 26–34)
MCHC RBC AUTO-ENTMCNC: 32.5 G/DL (ref 32–36)
MCV RBC AUTO: 89 FL (ref 80–100)
NON HDL CHOLESTEROL: 73 MG/DL (ref 0–149)
NRBC BLD-RTO: 0 /100 WBCS (ref 0–0)
PHOSPHATE SERPL-MCNC: 4 MG/DL (ref 2.5–4.9)
PLATELET # BLD AUTO: 381 X10*3/UL (ref 150–450)
POTASSIUM SERPL-SCNC: 4.6 MMOL/L (ref 3.5–5.3)
RBC # BLD AUTO: 3.51 X10*6/UL (ref 4.5–5.9)
SODIUM SERPL-SCNC: 141 MMOL/L (ref 136–145)
TRIGL SERPL-MCNC: 68 MG/DL (ref 0–149)
VLDL: 14 MG/DL (ref 0–40)
WBC # BLD AUTO: 8.6 X10*3/UL (ref 4.4–11.3)

## 2024-01-17 PROCEDURE — 80061 LIPID PANEL: CPT

## 2024-01-17 PROCEDURE — 80069 RENAL FUNCTION PANEL: CPT

## 2024-01-17 PROCEDURE — 71046 X-RAY EXAM CHEST 2 VIEWS: CPT | Performed by: RADIOLOGY

## 2024-01-17 PROCEDURE — 83735 ASSAY OF MAGNESIUM: CPT

## 2024-01-17 PROCEDURE — 36415 COLL VENOUS BLD VENIPUNCTURE: CPT

## 2024-01-17 PROCEDURE — 85027 COMPLETE CBC AUTOMATED: CPT

## 2024-01-17 PROCEDURE — 71046 X-RAY EXAM CHEST 2 VIEWS: CPT

## 2024-01-19 ENCOUNTER — TELEPHONE (OUTPATIENT)
Dept: CARDIOLOGY | Facility: HOSPITAL | Age: 69
End: 2024-01-19
Payer: COMMERCIAL

## 2024-01-19 NOTE — TELEPHONE ENCOUNTER
Spoke to patient regarding fasting lipid panel on rosuvastatin 20 mg daily. He is tolerating this dose well without myalgias. His LDL is 60. Given he is s/p CABG his target DLD < 55. Near, but not at target. Discussed possibly increasing rosuvastatin to 40 mg daily, however her strongly prefers not to increase to 40 mg at this time. He will be increasing his activity level now that he is post op and would prefer to recheck lipids in 3 months and if not at target at that time will add zetia vs increase rosuvastatin to 40 mg daily. He has follow up appointment with  Erin Petersen of cardiac surgery on Monday. Not consistently checking PB at home though couple of days ago was 116/70mmHg and his dizziness is markedly improved after adjustments in meds were made. He still gets a little orthostatic if gets up quickly. He is still anemic ( only 2 weeks post op) and is taking his iron supplements. Yesterday walked 3.5 mils at an indoor track without difficulty and goes slowly with HR in 80s, and not above 90s bpm.  Overall doing quite well.

## 2024-01-22 ENCOUNTER — TELEMEDICINE CLINICAL SUPPORT (OUTPATIENT)
Dept: CARDIAC SURGERY | Facility: HOSPITAL | Age: 69
End: 2024-01-22
Payer: COMMERCIAL

## 2024-01-22 DIAGNOSIS — I25.118 CORONARY ARTERY DISEASE OF NATIVE ARTERY OF NATIVE HEART WITH STABLE ANGINA PECTORIS (CMS-HCC): ICD-10-CM

## 2024-01-22 NOTE — PROGRESS NOTES
Contacting Samir status post 1/4/24 cabg x 2. Doing very well at home walking at least 3 miles a day. Bp yesterday 114/60. Hasn't been weighing himself. Stating appetite appropriate, incisions intact. Confirmed next week follow up appointment with Dr. Jimenez. Mercedes Petersen RN

## 2024-01-24 ENCOUNTER — HOME CARE VISIT (OUTPATIENT)
Dept: HOME HEALTH SERVICES | Facility: HOME HEALTH | Age: 69
End: 2024-01-24
Payer: COMMERCIAL

## 2024-01-24 VITALS
TEMPERATURE: 98 F | HEART RATE: 72 BPM | SYSTOLIC BLOOD PRESSURE: 138 MMHG | BODY MASS INDEX: 26.72 KG/M2 | OXYGEN SATURATION: 98 % | WEIGHT: 197 LBS | RESPIRATION RATE: 18 BRPM | DIASTOLIC BLOOD PRESSURE: 68 MMHG

## 2024-01-24 PROCEDURE — G0299 HHS/HOSPICE OF RN EA 15 MIN: HCPCS

## 2024-01-24 ASSESSMENT — ENCOUNTER SYMPTOMS
APPETITE LEVEL: FAIR
DEPRESSION: 0
OCCASIONAL FEELINGS OF UNSTEADINESS: 0
DENIES PAIN: 1
LOSS OF SENSATION IN FEET: 0

## 2024-01-24 ASSESSMENT — PAIN SCALES - PAIN ASSESSMENT IN ADVANCED DEMENTIA (PAINAD)
BODYLANGUAGE: 0 - RELAXED.
FACIALEXPRESSION: 0
BODYLANGUAGE: 0
NEGVOCALIZATION: 0
TOTALSCORE: 0
CONSOLABILITY: 0
NEGVOCALIZATION: 0 - NONE.
CONSOLABILITY: 0 - NO NEED TO CONSOLE.
BREATHING: 0
FACIALEXPRESSION: 0 - SMILING OR INEXPRESSIVE.

## 2024-01-26 RX ORDER — CHLORHEXIDINE GLUCONATE 40 MG/ML
SOLUTION TOPICAL DAILY PRN
Qty: 473 ML | Refills: 0 | Status: SHIPPED | OUTPATIENT
Start: 2024-01-26 | End: 2024-01-31

## 2024-01-26 RX ORDER — CHLORHEXIDINE GLUCONATE ORAL RINSE 1.2 MG/ML
15 SOLUTION DENTAL AS NEEDED
Qty: 473 ML | Refills: 0 | Status: SHIPPED | OUTPATIENT
Start: 2024-01-26 | End: 2024-01-28

## 2024-01-29 PROBLEM — L82.0 INFLAMED SEBORRHEIC KERATOSIS: Status: ACTIVE | Noted: 2018-05-15

## 2024-01-29 PROBLEM — J06.9 ACUTE UPPER RESPIRATORY INFECTION: Status: RESOLVED | Noted: 2024-01-29 | Resolved: 2024-01-29

## 2024-01-29 PROBLEM — L81.4 LENTIGINOSIS: Status: ACTIVE | Noted: 2018-05-15

## 2024-01-29 PROBLEM — D22.61 MELANOCYTIC NEVUS OF RIGHT UPPER EXTREMITY: Status: ACTIVE | Noted: 2018-05-15

## 2024-01-29 PROBLEM — D23.71 BENIGN NEOPLASM OF SKIN OF RIGHT LOWER EXTREMITY: Status: ACTIVE | Noted: 2018-05-15

## 2024-01-29 PROBLEM — N52.8 OTHER MALE ERECTILE DYSFUNCTION: Status: RESOLVED | Noted: 2023-02-06 | Resolved: 2024-01-29

## 2024-01-29 PROBLEM — R19.7 DIARRHEA: Status: RESOLVED | Noted: 2024-01-29 | Resolved: 2024-01-29

## 2024-01-29 PROBLEM — I25.10 ATHEROSCLEROSIS OF CORONARY ARTERY: Status: ACTIVE | Noted: 2023-11-08

## 2024-01-29 PROBLEM — E78.5 DYSLIPIDEMIA: Status: ACTIVE | Noted: 2023-09-25

## 2024-01-29 PROBLEM — R42 VERTIGO: Status: RESOLVED | Noted: 2023-10-11 | Resolved: 2024-01-29

## 2024-01-29 PROBLEM — D48.5 NEOPLASM OF UNCERTAIN BEHAVIOR OF SKIN: Status: ACTIVE | Noted: 2024-01-29

## 2024-01-29 PROBLEM — K52.9 COLITIS: Status: ACTIVE | Noted: 2023-06-21

## 2024-01-29 PROBLEM — D22.72 MELANOCYTIC NEVUS OF LEFT LOWER EXTREMITY: Status: ACTIVE | Noted: 2018-05-15

## 2024-01-29 PROBLEM — I77.810 ASCENDING AORTA DILATATION (CMS-HCC): Status: ACTIVE | Noted: 2024-01-29

## 2024-01-29 PROBLEM — D22.5 MELANOCYTIC NEVUS OF TRUNK: Status: ACTIVE | Noted: 2018-05-15

## 2024-01-29 NOTE — PROGRESS NOTES
Samir returns status post 1/4/24 cabg x 2. Mercedes Petersen RN    Patient returns to clinic 4 weeks status post median sternotomy and coronary artery bypass grafting x 2 with left internal thoracic artery to the LAD and left radial artery to the posterior descending artery.  Patient is feeling well without complaints.  On physical exam his incision is well-healed and his sternum is stable.  His chest x-ray demonstrates intact sternal wires and clear lung fields.    Patient is recovering nicely from his coronary surgery.  Patient will be going to [place] very soon.  Instructed him to wait an additional 4 weeks prior to doing exertional activity and driving.  Patient instructed to return to clinic as needed.

## 2024-01-31 ENCOUNTER — HOME CARE VISIT (OUTPATIENT)
Dept: HOME HEALTH SERVICES | Facility: HOME HEALTH | Age: 69
End: 2024-01-31
Payer: COMMERCIAL

## 2024-01-31 ASSESSMENT — PAIN SCALES - PAIN ASSESSMENT IN ADVANCED DEMENTIA (PAINAD)
FACIALEXPRESSION: 0 - SMILING OR INEXPRESSIVE.
FACIALEXPRESSION: 0
BODYLANGUAGE: 0
TOTALSCORE: 0
BODYLANGUAGE: 0 - RELAXED.
NEGVOCALIZATION: 0 - NONE.
CONSOLABILITY: 0 - NO NEED TO CONSOLE.
CONSOLABILITY: 0
NEGVOCALIZATION: 0
BREATHING: 0

## 2024-01-31 ASSESSMENT — ACTIVITIES OF DAILY LIVING (ADL)
HOME_HEALTH_OASIS: 01
OASIS_M1830: 00

## 2024-01-31 ASSESSMENT — ENCOUNTER SYMPTOMS: DENIES PAIN: 1

## 2024-02-01 ENCOUNTER — OFFICE VISIT (OUTPATIENT)
Dept: CARDIAC SURGERY | Facility: HOSPITAL | Age: 69
End: 2024-02-01
Payer: COMMERCIAL

## 2024-02-01 ENCOUNTER — HOSPITAL ENCOUNTER (OUTPATIENT)
Dept: RADIOLOGY | Facility: HOSPITAL | Age: 69
Discharge: HOME | End: 2024-02-01
Payer: COMMERCIAL

## 2024-02-01 VITALS
BODY MASS INDEX: 27.09 KG/M2 | OXYGEN SATURATION: 96 % | WEIGHT: 200 LBS | HEIGHT: 72 IN | DIASTOLIC BLOOD PRESSURE: 70 MMHG | SYSTOLIC BLOOD PRESSURE: 116 MMHG | HEART RATE: 78 BPM

## 2024-02-01 DIAGNOSIS — Z09 POSTOP CHECK: ICD-10-CM

## 2024-02-01 DIAGNOSIS — Z95.1 S/P CABG (CORONARY ARTERY BYPASS GRAFT): ICD-10-CM

## 2024-02-01 DIAGNOSIS — I25.10 2-VESSEL CORONARY ARTERY DISEASE: ICD-10-CM

## 2024-02-01 PROCEDURE — 1159F MED LIST DOCD IN RCRD: CPT | Performed by: THORACIC SURGERY (CARDIOTHORACIC VASCULAR SURGERY)

## 2024-02-01 PROCEDURE — 1111F DSCHRG MED/CURRENT MED MERGE: CPT | Performed by: THORACIC SURGERY (CARDIOTHORACIC VASCULAR SURGERY)

## 2024-02-01 PROCEDURE — 71046 X-RAY EXAM CHEST 2 VIEWS: CPT | Performed by: RADIOLOGY

## 2024-02-01 PROCEDURE — 71046 X-RAY EXAM CHEST 2 VIEWS: CPT

## 2024-02-01 PROCEDURE — 1126F AMNT PAIN NOTED NONE PRSNT: CPT | Performed by: THORACIC SURGERY (CARDIOTHORACIC VASCULAR SURGERY)

## 2024-02-01 PROCEDURE — 1036F TOBACCO NON-USER: CPT | Performed by: THORACIC SURGERY (CARDIOTHORACIC VASCULAR SURGERY)

## 2024-02-01 PROCEDURE — G0180 MD CERTIFICATION HHA PATIENT: HCPCS | Performed by: THORACIC SURGERY (CARDIOTHORACIC VASCULAR SURGERY)

## 2024-02-01 PROCEDURE — 1157F ADVNC CARE PLAN IN RCRD: CPT | Performed by: THORACIC SURGERY (CARDIOTHORACIC VASCULAR SURGERY)

## 2024-02-01 PROCEDURE — 99024 POSTOP FOLLOW-UP VISIT: CPT | Performed by: THORACIC SURGERY (CARDIOTHORACIC VASCULAR SURGERY)

## 2024-02-01 RX ORDER — ISOSORBIDE MONONITRATE 30 MG/1
30 TABLET, EXTENDED RELEASE ORAL DAILY
Qty: 90 TABLET | Refills: 0 | Status: SHIPPED | OUTPATIENT
Start: 2024-02-01 | End: 2024-05-01

## 2024-02-01 ASSESSMENT — PAIN SCALES - GENERAL: PAINLEVEL: 0-NO PAIN

## 2024-02-15 ENCOUNTER — APPOINTMENT (OUTPATIENT)
Dept: CARDIAC SURGERY | Facility: HOSPITAL | Age: 69
End: 2024-02-15
Payer: COMMERCIAL

## 2024-02-16 DIAGNOSIS — Z95.1 S/P CABG (CORONARY ARTERY BYPASS GRAFT): ICD-10-CM

## 2024-02-16 DIAGNOSIS — I25.10 2-VESSEL CORONARY ARTERY DISEASE: ICD-10-CM

## 2024-02-16 RX ORDER — METOPROLOL TARTRATE 25 MG/1
12.5 TABLET, FILM COATED ORAL 2 TIMES DAILY
Qty: 90 TABLET | Refills: 3 | Status: SHIPPED | OUTPATIENT
Start: 2024-02-16 | End: 2025-02-15

## 2024-05-15 DIAGNOSIS — T75.3XXS MOTION SICKNESS, SEQUELA: Primary | ICD-10-CM

## 2024-05-15 RX ORDER — SCOLOPAMINE TRANSDERMAL SYSTEM 1 MG/1
1 PATCH, EXTENDED RELEASE TRANSDERMAL
Qty: 10 PATCH | Refills: 0 | Status: SHIPPED | OUTPATIENT
Start: 2024-05-15 | End: 2024-07-14

## 2024-06-20 ENCOUNTER — OFFICE VISIT (OUTPATIENT)
Dept: CARDIOLOGY | Facility: HOSPITAL | Age: 69
End: 2024-06-20
Payer: COMMERCIAL

## 2024-06-20 VITALS
BODY MASS INDEX: 26.48 KG/M2 | HEIGHT: 72 IN | DIASTOLIC BLOOD PRESSURE: 71 MMHG | WEIGHT: 195.5 LBS | HEART RATE: 64 BPM | OXYGEN SATURATION: 97 % | RESPIRATION RATE: 18 BRPM | SYSTOLIC BLOOD PRESSURE: 111 MMHG

## 2024-06-20 DIAGNOSIS — Z95.1 S/P CABG X 2: ICD-10-CM

## 2024-06-20 DIAGNOSIS — E78.5 DYSLIPIDEMIA: Primary | ICD-10-CM

## 2024-06-20 DIAGNOSIS — I25.10 2-VESSEL CORONARY ARTERY DISEASE: ICD-10-CM

## 2024-06-20 DIAGNOSIS — Z95.1 S/P CABG (CORONARY ARTERY BYPASS GRAFT): ICD-10-CM

## 2024-06-20 DIAGNOSIS — I25.10 CORONARY ARTERY DISEASE INVOLVING NATIVE CORONARY ARTERY OF NATIVE HEART WITHOUT ANGINA PECTORIS: ICD-10-CM

## 2024-06-20 DIAGNOSIS — I77.810 ASCENDING AORTA DILATATION (CMS-HCC): ICD-10-CM

## 2024-06-20 DIAGNOSIS — I51.9 LV DYSFUNCTION: ICD-10-CM

## 2024-06-20 DIAGNOSIS — I25.119 CORONARY ARTERY DISEASE WITH ANGINA PECTORIS, UNSPECIFIED VESSEL OR LESION TYPE, UNSPECIFIED WHETHER NATIVE OR TRANSPLANTED HEART (CMS-HCC): ICD-10-CM

## 2024-06-20 LAB
ATRIAL RATE: 65 BPM
P AXIS: 57 DEGREES
P OFFSET: 182 MS
P ONSET: 123 MS
PR INTERVAL: 202 MS
Q ONSET: 224 MS
QRS COUNT: 10 BEATS
QRS DURATION: 80 MS
QT INTERVAL: 382 MS
QTC CALCULATION(BAZETT): 397 MS
QTC FREDERICIA: 392 MS
R AXIS: 87 DEGREES
T AXIS: 77 DEGREES
T OFFSET: 415 MS
VENTRICULAR RATE: 65 BPM

## 2024-06-20 PROCEDURE — 1157F ADVNC CARE PLAN IN RCRD: CPT | Performed by: INTERNAL MEDICINE

## 2024-06-20 PROCEDURE — 1160F RVW MEDS BY RX/DR IN RCRD: CPT | Performed by: INTERNAL MEDICINE

## 2024-06-20 PROCEDURE — 99214 OFFICE O/P EST MOD 30 MIN: CPT | Performed by: INTERNAL MEDICINE

## 2024-06-20 PROCEDURE — 1159F MED LIST DOCD IN RCRD: CPT | Performed by: INTERNAL MEDICINE

## 2024-06-20 PROCEDURE — 93005 ELECTROCARDIOGRAM TRACING: CPT | Performed by: INTERNAL MEDICINE

## 2024-06-20 RX ORDER — METOPROLOL SUCCINATE 25 MG/1
12.5 TABLET, EXTENDED RELEASE ORAL DAILY
Qty: 45 TABLET | Refills: 3 | Status: SHIPPED | OUTPATIENT
Start: 2024-06-20 | End: 2025-06-20

## 2024-06-20 RX ORDER — ROSUVASTATIN CALCIUM 20 MG/1
20 TABLET, COATED ORAL DAILY
Qty: 90 TABLET | Refills: 3 | Status: SHIPPED | OUTPATIENT
Start: 2024-06-20 | End: 2025-06-20

## 2024-06-20 NOTE — PROGRESS NOTES
Primary Care Physician: Shey Nguyễn DO      Date of Visit: 06/20/2024  9:40 AM EDT  Location of visit: [facility]   Type of Visit: Established Patient     HPI / Summary:   Jones Howell is a very pleasant 68 y.o. male who was found to have a high CAC ( 775) and reduced LV EF ( 35-40%) on cath found to have multivessel CAD who is s/p CABG x 2 ( LIMA-LAD, RA-RPDA, by MARY GRACE Jimenez  January 4 2024) who comes for follow up       He has no significant past medical history except borderline lipids and had recent CT for CAC 6/22/2023: total 775.15:  LM =o,  .62,  Lcx 3.56,  .97. Ascending aorta 3.7 c,  also mildly dilated R PA and main PA and L PA, raising concern for possibly elevated PA pressures.   CAD risk facotrs: no Fhx, no HTN, borderline lipids, never smoked and no DM.   Cardiac echo done 11/8/2023: technically difficult study , but LVEF 35-40% with regional wall motion abnormalities, but recommended limited repeat with use of echo-contrast due to sub-optimal endocardial definition.   Pt had been an avid runner and was asymptomatic, but after findings significant CAD was asked to stop running for now. He remains asymptomatic with ADLs and his current home Bps run 120s/70smmHg He is on rosuvastatin 20 mg daily without any myalgias. At a prior visit started losartan 25 mg daily due to low LVEF. Will not adjust at this time. Following surgery will reassess LV systolic function. After that can adjust CHF med regimen.   Cardiac cath 12/4/2023: severe 2 vessel CAD. Elevated LVEDP.    Coronary Lesion Summary:  Vessel         Stenosis   Vessel Segment  LAD          80% stenosis proximal to mid  LAD          80% stenosis       mid  1st Diagonal 40% stenosis    proximal  Circumflex   30% stenosis    proximal  Ramus        40% stenosis    proximal  RCA          99% stenosis       mid  RPDA         90% stenosis    proximal  1/4/2024: CABG x 2 ( MARY GRACE Jimenez ( ODOM-LA, RA-PDA) He did well post op. Currently  walking 6 miles per day without CP or SOB. BP at home running 110s/70smmHg with HR 60s bpm . No palpitations presyncope or syncope. NO sx of CHF. No myalgias on statin. Occasionally can get mildly dizzy if stands up too quickly         ROS: Full 10 pt review of symptoms of negative unless discussed above.     Problems:   Patient Active Problem List   Diagnosis    Focal lymphocytic colitis    Celiac disease (HHS-HCC)    Anisometropia    Benign positional vertigo    Blepharitis    Contact dermatitis due to poison ivy    ED (erectile dysfunction) of organic origin    Gastroesophageal reflux disease    Hemangioma of skin and subcutaneous tissue    Pigmented skin lesion    Lactose intolerance    Benign neoplasm of skin of right lower extremity    Melanocytic nevus of trunk    Loose stools    Nuclear sclerotic cataract    Melanocytic nevus of left lower extremity    Other melanin hyperpigmentation    Actinic keratosis    Inflamed seborrheic keratosis    Skin changes due to chronic exposure to nonionizing radiation, unspecified    Pseudophakia of right eye    Sciatica    Skin lesion    Vertigo    Vitamin D deficiency    Melanocytic nevi of right upper limb, including shoulder    Melanocytic nevus of right upper extremity    Melanocytic nevi of right lower limb, including hip    Melanocytic nevi of left lower limb, including hip    Atherosclerosis of coronary artery    Left ventricular dysfunction    Neoplasm of uncertain behavior of skin    Lentiginosis    Dyslipidemia    Colitis    Ascending aorta dilatation (CMS-HCC)     Medical History:   Past Medical History:   Diagnosis Date    Acute upper respiratory infection 01/29/2024    Cataract     Celiac disease (HHS-HCC)     Colitis     Coronary artery disease 12/04/2023    Severe 2 vessel CAD in a right dominant system.  Elevated LVEDP.  No evidence of aortic stenosis.    Diarrhea 01/29/2024    Hyperlipidemia     Lymphocytic colitis     Other male erectile dysfunction  02/06/2023    Vertigo 10/11/2023     Surgical Hx:   Past Surgical History:   Procedure Laterality Date    CARDIAC CATHETERIZATION N/A 12/04/2023    Procedure: Left Heart Cath, With LV;  Surgeon: Javier Ma MD;  Location: [facility] Cardiac Cath Lab;  Service: Cardiovascular;  Laterality: N/A;    LUMBAR SPINE SURGERY        Social Hx:   Tobacco Use: Low Risk  (6/20/2024)    Patient History     Smoking Tobacco Use: Never     Smokeless Tobacco Use: Never     Passive Exposure: Not on file     Alcohol Use: Not At Risk (2/5/2023)    Received from [facility]    AUDIT-C     Frequency of Alcohol Consumption: 2-4 times a month     Average Number of Drinks: 1 or 2     Frequency of Binge Drinking: Never     Family Hx:   Family History   Problem Relation Name Age of Onset    Alzheimer's disease Mother  80    Other (COVID) Father  90      Exam:   Vitals:   Vitals:    06/20/24 0956   BP: 111/71   BP Location: Left arm   Patient Position: Sitting   BP Cuff Size: Adult   Pulse: 64   Resp: 18   SpO2: 97%   Weight: 88.7 kg (195 lb 8 oz)   Height: 1.829 m (6')     Wt Readings from Last 5 Encounters:   06/20/24 88.7 kg (195 lb 8 oz)   02/01/24 90.7 kg (200 lb)   01/24/24 89.4 kg (197 lb)   01/14/24 93 kg (205 lb)   01/09/24 91.8 kg (202 lb 6.4 oz)      Constitutional:       Appearance: Healthy appearance. Not in distress.   Pulmonary:      Effort: Pulmonary effort is normal.      Breath sounds: Normal breath sounds.   Cardiovascular:      PMI at left midclavicular line. Normal rate. Regular rhythm. S1 with normal intensity. S2 with normal intensity.       Murmurs: There is no murmur.   Pulses:     Intact distal pulses.   Edema:     Peripheral edema present.  Neurological:      Mental Status: Alert and oriented to person, place, and time.       Labs:   Recent Labs     01/17/24  1200 01/09/24  0848 01/08/24  1043 01/07/24  0719 01/06/24  1048 01/05/24  0207 01/04/24  1427 12/22/23  0948   WBC 8.6 7.5 5.8 7.9 12.4* 9.2 16.8* 5.1   HGB  10.1* 10.1* 9.0* 10.0* 9.7* 10.6* 10.8* 12.5*   HCT 31.1* 31.3* 28.0* 30.1* 29.4* 32.2* 32.6* 38.4*    264 187 181 177 157 192 211   MCV 89 88 89 87 89 87 87 88     Recent Labs     01/17/24  1200 01/09/24  0848 01/08/24  1043 01/07/24  0719 01/06/24  1048 01/05/24  0207 01/04/24  1427 12/22/23  0948    139 139 138 132* 137 142 141   K 4.6 3.9 3.7 3.7 4.0 5.1 4.3 4.4    101 104 102 99 105 110* 106   BUN 15 9 10 11 17 15 16 12   CREATININE 0.92 0.94 0.78 0.83 0.94 0.78 0.99 0.99      Recent Labs     11/30/23  0838 10/19/23  0814   HGBA1C  --  5.3   BNP 17  --    FERRITIN  --  211   TIBC  --  349   IRONSAT  --  25     Lab Results   Component Value Date    CHOL 126 01/17/2024    HDL 52.6 01/17/2024    LDLF 122 (H) 11/24/2021    TRIG 68 01/17/2024   LDL 1/17/2024: 60  Notable Studies: imaging personally reviewed and summarized by me below  EKG:  Encounter Date: 06/20/24   ECG 12 lead (Clinic Performed)   Result Value    Ventricular Rate 65    Atrial Rate 65    WI Interval 202    QRS Duration 80    QT Interval 382    QTC Calculation(Bazett) 397    P Axis 57    R Axis 87    T Axis 77    QRS Count 10    Q Onset 224    P Onset 123    P Offset 182    T Offset 415    QTC Fredericia 392    Narrative    Normal sinus rhythm  Septal infarct , age undetermined  Abnormal ECG  When compared with ECG of 13-NOV-2023 08:20,  Septal infarct is now Present  Nonspecific T wave abnormality now evident in Lateral leads       TTE 11/13/2023:  PHYSICIAN INTERPRETATION:  Left Ventricle: The left ventricular systolic function is mildly decreased. Wall motion is abnormal. The left ventricular cavity size is normal. Left ventricular diastolic filling was not assessed. Technically difficult despite the use of echocontrast. ( note apical echocontrast images were foreshortened.) The LV apex does appear trabeculated. Note noncontrast images suggestive of LAD territory wall motion abnormality. Also possible inferior hypokinesis.  Overall likely mild regional LV systolic dysfunction.  LV Wall Scoring:  The entire anterior septum, apical anterior segment, and apex are hypokinetic.     Left Atrium: The left atrium was not assessed.  Right Ventricle: The right ventricle was not assessed. Right ventricular systolic function not assessed.  Right Atrium: The right atrium was not assessed.  Aortic Valve: The aortic valve is trileaflet. There is no evidence of aortic valve regurgitation.  Mitral Valve: The mitral valve is normal in structure. There is trace mitral valve regurgitation.  Tricuspid Valve: The tricuspid valve is structurally normal. There is trace tricuspid regurgitation. The right ventricular systolic pressure is unable to be estimated.  Pulmonic Valve: The pulmonic valve was not assessed. Pulmonic valve regurgitation was not assessed.  Pericardium: There is no pericardial effusion noted.  Aorta: The aortic root is abnormal. There is mild dilatation of the aortic root.  Systemic Veins: The inferior vena cava appears mildly dilated.  In comparison to the previous echocardiogram(s): Compared with the prior exam from 11/8/2023, today's limited study did include the use of echocontrast, however these images were somewhat foreshortened. Overall likely LAD territory wall motion abnormality with possibly mild regional LV systolic dysfunction.        CONCLUSIONS:   1. Left ventricular systolic function is mildly decreased.   2. Entire anterior septum, apical anterior segment, and apex are abnormal.   3. Technically difficult despite the use of echocontrast. ( note apical echocontrast images were foreshortened.) The LV apex does appear trabeculated. Note noncontrast images suggestive of LAD territory wall motion abnormality. Also possible inferior hypokinesis. Overall likely mild regional LV systolic dysfunction.   4. Compared with the prior exam from 11/8/2023, today's limited study did include the use of echocontrast, however these images were  somewhat foreshortened. Overall likely LAD territory wall motion abnormality with possibly mild regional LV systolic dysfunction.       Echo:  - Echo (Intraoperative echo 1/4/2024:)  PHYSICIAN INTERPRETATION:  Left Ventricle: The left ventricular systolic function is moderately to severely decreased, with an estimated ejection fraction of 35-40%. There are multiple wall motion abnormalities. The left ventricular cavity size is mildly dilated. The left ventricular septal wall thickness is mildly increased. Spectral Doppler shows an impaired relaxation pattern of left ventricular diastolic filling.  Left Atrium: The left atrium is normal in size. There is no evidence of a patent foramen ovale. There is evidence of an atrial septal aneurysm. There is a normal sized left atrial appendage, there is no mass visualized in the left atrial appendage and there is no spontaneous contrast noted in the left atrial appendage.  Right Ventricle: The right ventricle is mildly enlarged. There is normal right ventricular global systolic function.  Right Atrium: The right atrium is normal in size.  Aortic Valve: The aortic valve is trileaflet. There is no evidence of aortic valve regurgitation.  Mitral Valve: The mitral valve is normal in structure. There is trace mitral valve regurgitation.  Tricuspid Valve: The tricuspid valve is structurally normal. No evidence of tricuspid regurgitation.  Pulmonic Valve: The pulmonic valve is structurally normal. There is no indication of pulmonic valve regurgitation.  Pericardium: There is no pericardial effusion noted.  Aorta: The aortic root is normal. The descending aorta is classified as a Grade 2 [mild (focal or diffuse) intimal thickening of 2-3 mm] atherosclerosis.  In comparison to the previous echocardiogram(s): Not performed on intraoperative study.        CONCLUSIONS:   1. Left ventricular systolic function is moderately to severely decreased with a 35-40% estimated ejection fraction.    2. Spectral Doppler shows an impaired relaxation pattern of left ventricular diastolic filling.   3. Atrial septal aneurysm present.   4. There are multiple wall motion abnormalities.     POST CARDIOPULMONARY BYPASS REPORT: S/P CABGX2  Patient is being paced. Patient is on an epinephrine drip. LV function is mildly improved from pre-pump exam. RV function is unchanged from pre-pump exam.    Catheterization:  - [facility] (12/4/2023)  Coronary Lesion Summary:  Vessel         Stenosis   Vessel Segment  LAD          80% stenosis proximal to mid  LAD          80% stenosis       mid  1st Diagonal 40% stenosis    proximal  Circumflex   30% stenosis    proximal  Ramus        40% stenosis    proximal  RCA          99% stenosis       mid  RPDA         90% stenosis    proximal  CONCLUSIONS:   1. Severe 2 vessel CAD in a right dominant system.   2. Elevated LVEDP.   3. No evidence of aortic stenosis        Current Outpatient Medications   Medication Instructions    aspirin 81 mg, oral, Daily    b complex 0.4 mg tablet 1 tablet, oral, Daily    cholecalciferol (VITAMIN D3) 50 mcg, oral, Daily    docusate sodium (COLACE) 100 mg, oral, 2 times daily PRN    metoprolol succinate XL (TOPROL-XL) 12.5 mg, oral, Daily, Do not crush or chew.    multivitamin with minerals tablet 1 tablet, oral, Daily    polyethylene glycol (GLYCOLAX, MIRALAX) 17 g, oral, Daily PRN    rosuvastatin (CRESTOR) 20 mg, oral, Daily     Impressions and Plan:    Pt is a 68 year old man found to have high CAC and severe 2 vessel CAD who is now s/p CA BG ( LIMA_LAD, RA-PDA) 1/4/2024 who returns for follow up. He is doing quite well and is active without any cardiac sx and is tolerating his statin ( rosuvastatin 20 mg ) well without myalgias. He is due for repeat lipid panel to see if at target. In January he was near but not at target ( target LDL < 55) but has been more active since that time. Hid LVEF prior to surgery was 35-40% and will recheck echo to see if  improvement following revascularization.   Plan  1. CAD- s/p CABG- continue aspirin and metoprolol and statin( will change metoprolol tartrate to succinate ( 25 mg daily)    2. Previously reduced LVEF from CAD- recheck echo post revascularization  3. Dyslipidemia- [place] LDL < 55. Recheck lipids . Currently rosuvastatin 20 mg daily   4. Continue current level of exercise.     Return to clinic in 6 months ( will review echo results once available)     Patient Instructions:  If you have any questions or need cardiac medication refills, please call my office at [phone],      To reach my office please call [phone]  To schedule an appointment call [phone].          ____________________________________________________________  Michelle Jimenez MD  Division of Cardiovascular Medicine  [facility]  [facility]

## 2024-06-20 NOTE — PATIENT INSTRUCTIONS
"Franklin is concerned that her  is extremely weak and very tired after being released from [facility] yesterday. She states that he started dialysis this week, he had 4 treatments this week. His last treatment was Friday morning.   Protocol reviewed with he patient and his wife, advise given to be seen the the ED.  They stated that they will go to the ED.     Reason for Disposition  • [1] MODERATE weakness (i.e., interferes with work, school, normal activities) AND [2] cause unknown  (Exceptions: weakness with acute minor illness, or weakness from poor fluid intake)    Additional Information  • Negative: SEVERE difficulty breathing (e.g., struggling for each breath, speaks in single words)  • Negative: Shock suspected (e.g., cold/pale/clammy skin, too weak to stand, low BP, rapid pulse)  • Negative: Difficult to awaken or acting confused (e.g., disoriented, slurred speech)  • Negative: [1] Fainted > 15 minutes ago AND [2] still feels too weak or dizzy to stand  • Negative: [1] SEVERE weakness (i.e., unable to walk or barely able to walk, requires support) AND [2] new-onset or worsening  • Negative: Sounds like a life-threatening emergency to the triager  • Negative: Weakness of the face, arm or leg on one side of the body  • Negative: [1] Diabetes mellitus AND [2] weakness from low blood sugar (i.e., < 60 mg/dl or 3.5 mmol/l)  • Negative: Heat exhaustion suspected (i.e., dehydration from heat exposure)  • Negative: Chest pain  • Negative: Vomiting is main symptom  • Negative: Diarrhea is main symptom  • Negative: Difficulty breathing  • Negative: Heart beating < 50 beats per minute OR > 140 beats per minute  • Negative: Extra heart beats OR irregular heart beating   (i.e., \"palpitations\")  • Negative: Follows bleeding (e.g., from vomiting, rectum, vagina; Exception: small brief weakness from sight of a small amount blood)  • Negative: Black or tarry bowel movements  • Negative: [1] Drinking very little " CAD, s/p CABG- continue aspirin metoprolol and rosuvastatin 20 mg daily. Will recheck LVEF with cardiac echo  Dyslipidemia- continue rosuvastatin 20 mg daily with LDL target < 55. Due for recheck of lipids  Continue current  level of exercise.  Will change metoprolol from tartrate ( short acting) to succinate 12.5 mg daily.    Return to clinic in 6 months  Will call with echo results once completed    "AND [2] dehydration suspected (e.g., no urine > 12 hours, very dry mouth, very lightheaded)  • Negative: Patient sounds very sick or weak to the triager    Answer Assessment - Initial Assessment Questions  1. DESCRIPTION: \"Describe how you are feeling.\"      Wiped out, no energy, doesn't want to walk far, but not unsteady  2. SEVERITY: \"How bad is it?\"  \"Can you stand and walk?\"    - MILD - Feels weak or tired, but does not interfere with work, school or normal activities    - MODERATE - Able to stand and walk; weakness interferes with work, school, or normal activities    - SEVERE - Unable to stand or walk      moderate  3. ONSET:  \"When did the weakness begin?\"     Started last night and more this morning  4. CAUSE: \"What do you think is causing the weakness?\"      unknown  5. MEDICINES: \"Have you recently started a new medicine or had a change in the amount of a medicine?\"      no  6. OTHER SYMPTOMS: \"Do you have any other symptoms?\" (e.g., chest pain, fever, cough, SOB, vomiting, diarrhea, bleeding, other areas of pain)     \" Shortness of breath when I first get up and go to the bathroom\"  7. PREGNANCY: \"Is there any chance you are pregnant?\" \"When was your last menstrual period?\"      na    Protocols used: WEAKNESS (GENERALIZED) AND FATIGUE-ADULT-AH      "

## 2024-07-08 ENCOUNTER — LAB (OUTPATIENT)
Dept: LAB | Facility: LAB | Age: 69
End: 2024-07-08
Payer: COMMERCIAL

## 2024-07-08 ENCOUNTER — HOSPITAL ENCOUNTER (OUTPATIENT)
Dept: CARDIOLOGY | Facility: HOSPITAL | Age: 69
Discharge: HOME | End: 2024-07-08
Payer: COMMERCIAL

## 2024-07-08 DIAGNOSIS — I25.10 CORONARY ARTERY DISEASE INVOLVING NATIVE CORONARY ARTERY OF NATIVE HEART WITHOUT ANGINA PECTORIS: ICD-10-CM

## 2024-07-08 DIAGNOSIS — I51.9 LV DYSFUNCTION: ICD-10-CM

## 2024-07-08 DIAGNOSIS — Z95.1 S/P CABG X 2: ICD-10-CM

## 2024-07-08 DIAGNOSIS — E78.5 DYSLIPIDEMIA: ICD-10-CM

## 2024-07-08 LAB
ALBUMIN SERPL BCP-MCNC: 4.5 G/DL (ref 3.4–5)
ALP SERPL-CCNC: 49 U/L (ref 33–136)
ALT SERPL W P-5'-P-CCNC: 20 U/L (ref 10–52)
ANION GAP SERPL CALC-SCNC: 11 MMOL/L (ref 10–20)
AORTIC VALVE MEAN GRADIENT: 1 MMHG
AORTIC VALVE PEAK VELOCITY: 0.8 M/S
AST SERPL W P-5'-P-CCNC: 31 U/L (ref 9–39)
AV PEAK GRADIENT: 2.6 MMHG
AVA (PEAK VEL): 2.46 CM2
AVA (VTI): 2.53 CM2
BILIRUB SERPL-MCNC: 0.9 MG/DL (ref 0–1.2)
BUN SERPL-MCNC: 17 MG/DL (ref 6–23)
CALCIUM SERPL-MCNC: 9.1 MG/DL (ref 8.6–10.3)
CHLORIDE SERPL-SCNC: 102 MMOL/L (ref 98–107)
CO2 SERPL-SCNC: 29 MMOL/L (ref 21–32)
CREAT SERPL-MCNC: 1.01 MG/DL (ref 0.5–1.3)
EGFRCR SERPLBLD CKD-EPI 2021: 82 ML/MIN/1.73M*2
EJECTION FRACTION APICAL 4 CHAMBER: 53.9
EJECTION FRACTION: 38 %
GLUCOSE SERPL-MCNC: 78 MG/DL (ref 74–99)
LEFT ATRIUM VOLUME AREA LENGTH INDEX BSA: 32.2 ML/M2
LEFT VENTRICLE INTERNAL DIMENSION DIASTOLE: 5.5 CM (ref 3.5–6)
LEFT VENTRICULAR OUTFLOW TRACT DIAMETER: 2.3 CM
MITRAL VALVE E/A RATIO: 1.38
POTASSIUM SERPL-SCNC: 4.2 MMOL/L (ref 3.5–5.3)
PROT SERPL-MCNC: 7.3 G/DL (ref 6.4–8.2)
RIGHT VENTRICLE PEAK SYSTOLIC PRESSURE: 22.5 MMHG
SODIUM SERPL-SCNC: 138 MMOL/L (ref 136–145)
TRICUSPID ANNULAR PLANE SYSTOLIC EXCURSION: 1.2 CM

## 2024-07-08 PROCEDURE — 2500000004 HC RX 250 GENERAL PHARMACY W/ HCPCS (ALT 636 FOR OP/ED): Performed by: INTERNAL MEDICINE

## 2024-07-08 PROCEDURE — 93306 TTE W/DOPPLER COMPLETE: CPT | Performed by: INTERNAL MEDICINE

## 2024-07-08 PROCEDURE — 93306 TTE W/DOPPLER COMPLETE: CPT

## 2024-07-08 PROCEDURE — 80053 COMPREHEN METABOLIC PANEL: CPT

## 2024-07-08 PROCEDURE — 36415 COLL VENOUS BLD VENIPUNCTURE: CPT

## 2024-07-22 ENCOUNTER — TELEMEDICINE (OUTPATIENT)
Dept: CARDIOLOGY | Facility: HOSPITAL | Age: 69
End: 2024-07-22
Payer: COMMERCIAL

## 2024-07-22 DIAGNOSIS — I51.9 LEFT VENTRICULAR DYSFUNCTION: Primary | ICD-10-CM

## 2024-07-22 DIAGNOSIS — Z95.1 S/P CABG X 2: ICD-10-CM

## 2024-07-22 DIAGNOSIS — E78.5 DYSLIPIDEMIA: ICD-10-CM

## 2024-07-22 DIAGNOSIS — I25.10 ATHEROSCLEROSIS OF NATIVE CORONARY ARTERY OF NATIVE HEART WITHOUT ANGINA PECTORIS: ICD-10-CM

## 2024-07-22 PROCEDURE — 1159F MED LIST DOCD IN RCRD: CPT | Performed by: INTERNAL MEDICINE

## 2024-07-22 PROCEDURE — RXMED WILLOW AMBULATORY MEDICATION CHARGE

## 2024-07-22 PROCEDURE — 99214 OFFICE O/P EST MOD 30 MIN: CPT | Performed by: INTERNAL MEDICINE

## 2024-07-22 PROCEDURE — G2211 COMPLEX E/M VISIT ADD ON: HCPCS | Performed by: INTERNAL MEDICINE

## 2024-07-22 PROCEDURE — 1157F ADVNC CARE PLAN IN RCRD: CPT | Performed by: INTERNAL MEDICINE

## 2024-07-22 PROCEDURE — 1160F RVW MEDS BY RX/DR IN RCRD: CPT | Performed by: INTERNAL MEDICINE

## 2024-07-22 RX ORDER — DAPAGLIFLOZIN 10 MG/1
10 TABLET, FILM COATED ORAL DAILY
Qty: 90 TABLET | Refills: 3 | Status: SHIPPED | OUTPATIENT
Start: 2024-07-22 | End: 2025-07-22

## 2024-07-22 NOTE — PROGRESS NOTES
Primary Care Physician: Shey Nguyễn DO      Date of Visit: 07/22/2024  1:30 PM EDT  Location of visit: [facility]   Type of Visit: Established Patient     HPI / Summary:       Primary Care Physician: Shey Nguyễn DO        Date of Visit: 06/20/2024  9:40 AM EDT  Location of visit: [facility]   Type of Visit: Established Patient     HPI / Summary:   Jones Howell is a very pleasant 68 y.o. male who was found to have a high CAC ( 775) and reduced LV EF ( 35-40%) on cath found to have multivessel CAD who is s/p CABG x 2 ( LIMA-LAD, RA-RPDA, by MARY GRACE Jimenez  January 4 2024) who reurns virtually to go over testing results    He has no significant past medical history except borderline lipids and had  CT for CAC 6/22/2023: total 775.15:  LM =o,  .62,  Lcx 3.56,  .97. Ascending aorta 3.7 c,  also mildly dilated R PA and main PA and L PA, raising concern for possibly elevated PA pressures.   CAD risk facotrs: no Fhx, no HTN, borderline lipids, never smoked and no DM.   Cardiac echo done 11/8/2023: technically difficult study , but LVEF 35-40% with regional wall motion abnormalities, but recommended limited repeat with use of echo-contrast due to sub-optimal endocardial definition.   Pt had been an avid runner and was asymptomatic, but after findings significant CAD was asked to stop running for now. He remains asymptomatic with ADLs and his current home Bps run 120s/70smmHg He is on rosuvastatin 20 mg daily without any myalgias. At a prior visit started losartan 25 mg daily due to low LVEF. Will not adjust at this time. Following surgery will reassess LV systolic function. After that can adjust CHF med regimen.   Cardiac cath 12/4/2023: severe 2 vessel CAD. Elevated LVEDP.    Coronary Lesion Summary:  Vessel         Stenosis   Vessel Segment  LAD          80% stenosis proximal to mid  LAD          80% stenosis       mid  1st Diagonal 40% stenosis    proximal  Circumflex   30% stenosis     proximal  Ramus        40% stenosis    proximal  RCA          99% stenosis       mid  RPDA         90% stenosis    proximal  1/4/2024: CABG x 2   J Tony ( ODOM-LA, RA-PDA) He did well post op.     Currently very active - [place] 3 x per week swimming ( up to 1 mile) and walking or running , all without CP or SOB. Has no sx of CHF. Has no myalgias on his statin.   Repeat echo on 7/8/2024- still showed LVEF 35-40% with  persistent apical wall motion abnormality. Insufficient TR to estimate RVSP. This was unchanged from the preop LV function. Not checking BP at home     ROS: Full 10 pt review of symptoms of negative unless discussed above.     Problems:   Patient Active Problem List   Diagnosis    Focal lymphocytic colitis    Celiac disease (HHS-HCC)    Anisometropia    Benign positional vertigo    Blepharitis    Contact dermatitis due to poison ivy    ED (erectile dysfunction) of organic origin    Gastroesophageal reflux disease    Hemangioma of skin and subcutaneous tissue    Pigmented skin lesion    Lactose intolerance    Benign neoplasm of skin of right lower extremity    Melanocytic nevus of trunk    Loose stools    Nuclear sclerotic cataract    Melanocytic nevus of left lower extremity    Other melanin hyperpigmentation    Actinic keratosis    Inflamed seborrheic keratosis    Skin changes due to chronic exposure to nonionizing radiation, unspecified    Pseudophakia of right eye    Sciatica    Skin lesion    Vertigo    Vitamin D deficiency    Melanocytic nevi of right upper limb, including shoulder    Melanocytic nevus of right upper extremity    Melanocytic nevi of right lower limb, including hip    Melanocytic nevi of left lower limb, including hip    Atherosclerosis of coronary artery    Left ventricular dysfunction    Neoplasm of uncertain behavior of skin    Lentiginosis    Dyslipidemia    Colitis    Ascending aorta dilatation (CMS-HCC)     Medical History:   Past Medical History:    Diagnosis Date    Acute upper respiratory infection 01/29/2024    Cataract     Celiac disease (HHS-HCC)     Colitis     Coronary artery disease 12/04/2023    Severe 2 vessel CAD in a right dominant system.  Elevated LVEDP.  No evidence of aortic stenosis.    Diarrhea 01/29/2024    Hyperlipidemia     Lymphocytic colitis     Other male erectile dysfunction 02/06/2023    Vertigo 10/11/2023     Surgical Hx:   Past Surgical History:   Procedure Laterality Date    CARDIAC CATHETERIZATION N/A 12/04/2023    Procedure: Left Heart Cath, With LV;  Surgeon: Javier Ma MD;  Location: [facility] Cardiac Cath Lab;  Service: Cardiovascular;  Laterality: N/A;    LUMBAR SPINE SURGERY        Social Hx:   Tobacco Use: Low Risk  (6/20/2024)    Patient History     Smoking Tobacco Use: Never     Smokeless Tobacco Use: Never     Passive Exposure: Not on file     Alcohol Use: Not At Risk (2/5/2023)    Received from [facility]    AUDIT-C     Frequency of Alcohol Consumption: 2-4 times a month     Average Number of Drinks: 1 or 2     Frequency of Binge Drinking: Never     Family Hx:   Family History   Problem Relation Name Age of Onset    Alzheimer's disease Mother  80    Other (COVID) Father  90      Exam:   Vitals: There were no vitals filed for this visit.  Wt Readings from Last 5 Encounters:   06/20/24 88.7 kg (195 lb 8 oz)   02/01/24 90.7 kg (200 lb)   01/24/24 89.4 kg (197 lb)   01/14/24 93 kg (205 lb)   01/09/24 91.8 kg (202 lb 6.4 oz)      Constitutional:       Appearance: Healthy appearance. Not in distress.   Pulmonary:      Effort: Pulmonary effort is normal.   Neurological:      Mental Status: Alert and oriented to person, place, and time.       Labs:   Recent Labs     01/17/24  1200 01/09/24  0848 01/08/24  1043 01/07/24  0719 01/06/24  1048 01/05/24  0207 01/04/24  1427 12/22/23  0948   WBC 8.6 7.5 5.8 7.9 12.4* 9.2 16.8* 5.1   HGB 10.1* 10.1* 9.0* 10.0* 9.7* 10.6* 10.8* 12.5*   HCT 31.1* 31.3* 28.0* 30.1* 29.4* 32.2*  32.6* 38.4*    264 187 181 177 157 192 211   MCV 89 88 89 87 89 87 87 88     Recent Labs     07/08/24  0930 01/17/24  1200 01/09/24  0848 01/08/24  1043 01/07/24  0719 01/06/24  1048 01/05/24  0207 01/04/24  1427    141 139 139 138 132* 137 142   K 4.2 4.6 3.9 3.7 3.7 4.0 5.1 4.3    103 101 104 102 99 105 110*   BUN 17 15 9 10 11 17 15 16   CREATININE 1.01 0.92 0.94 0.78 0.83 0.94 0.78 0.99      Recent Labs     11/30/23  0838 10/19/23  0814   HGBA1C  --  5.3   BNP 17  --    FERRITIN  --  211   TIBC  --  349   IRONSAT  --  25     Lab Results   Component Value Date    CHOL 126 01/17/2024    HDL 52.6 01/17/2024    LDLF 122 (H) 11/24/2021    TRIG 68 01/17/2024   LDL 1/17/2024:  60  Notable Studies: imaging personally reviewed and summarized by me below  EKG:  Encounter Date: 06/20/24   ECG 12 lead (Clinic Performed)   Result Value    Ventricular Rate 65    Atrial Rate 65    IL Interval 202    QRS Duration 80    QT Interval 382    QTC Calculation(Bazett) 397    P Axis 57    R Axis 87    T Axis 77    QRS Count 10    Q Onset 224    P Onset 123    P Offset 182    T Offset 415    QTC Fredericia 392    Narrative    Normal sinus rhythm with 1st degree AV block  Abnormal ECG  When compared with ECG of 13-NOV-2023 08:20,  Nonspecific T wave abnormality now evident in Lateral leads  Confirmed by Young Triplett ([id number]) on 6/30/2024 4:43:26 PM         Catheterization:  - [facility] (12/4/2023)  Coronary Lesion Summary:  Vessel         Stenosis   Vessel Segment  LAD          80% stenosis proximal to mid  LAD          80% stenosis       mid  1st Diagonal 40% stenosis    proximal  Circumflex   30% stenosis    proximal  Ramus        40% stenosis    proximal  RCA          99% stenosis       mid  RPDA         90% stenosis    proximal  CONCLUSIONS:   1. Severe 2 vessel CAD in a right dominant system.   2. Elevated LVEDP.   3. No evidence of aortic stenosis      Cardiac echo 7/8/2024:   PHYSICIAN INTERPRETATION:  Left Ventricle:  Left ventricular ejection fraction is moderately decreased, by visual estimate at 35-40%. Wall motion is abnormal. The left ventricular cavity size is normal. Abnormal (paradoxical) septal motion consistent with post-operative status. Spectral Doppler shows a normal pattern of left ventricular diastolic filling. There is no definite left ventricular thrombus visualized. Technically difficult apical views, espically with foreshortening of the 2 chamber views. Overall appears to have deuced LV systolic dysfunction with apical hypokinesis with LVEF approx 38%.  Left Atrium: The left atrium is normal in size.  Right Ventricle: The right ventricle is normal in size. There is mildly reduced right ventricular systolic function.  Right Atrium: The right atrium is mildly dilated.  Aortic Valve: The aortic valve is trileaflet. The aortic valve dimensionless index is 0.61. There is no evidence of aortic valve regurgitation. The peak instantaneous gradient of the aortic valve is 2.6 mmHg. The mean gradient of the aortic valve is 1.0 mmHg.  Mitral Valve: The mitral valve is normal in structure. There is trace mitral valve regurgitation.  Tricuspid Valve: The tricuspid valve is structurally normal. There is trace tricuspid regurgitation. The Doppler estimated RVSP is within normal limits at 22.5 mmHg.  Pulmonic Valve: The pulmonic valve is not well visualized. There is physiologic pulmonic valve regurgitation.  Pericardium: There is no pericardial effusion noted.  Aorta: The aortic root is normal. The aortic root is at the upper limits of normal size.  Systemic Veins: The inferior vena cava appears to be of normal size.  In comparison to the previous echocardiogram(s): Compared with study dated 11/13/2023,. Compared with the prior exams from 11/8/2023 and 11/13/2023 ( both preop from CABG) there is similar LV systolic function with a similar apical wall motion abnormality ( apical hypokinesis. ).        CONCLUSIONS:   1. Left  ventricular ejection fraction is moderately decreased, by visual estimate at 35-40%.   2. Abnormal septal motion consistent with post-operative status.   3. No left ventricular thrombus visualized.   4. Technically difficult apical views, espically with foreshortening of the 2 chamber views. Overall appears to have deuced LV systolic dysfunction with apical hypokinesis with LVEF approx 38%.   5. There is mildly reduced right ventricular systolic function.   6. RVSP within normal limits.   7. Compared with the prior exams from 11/8/2023 and 11/13/2023 ( both preop from CABG) there is similar LV systolic function with a similar apical wall motion abnormality ( apical hypokinesis. ).       Current Outpatient Medications   Medication Instructions    aspirin 81 mg, oral, Daily    b complex 0.4 mg tablet 1 tablet, oral, Daily    cholecalciferol (VITAMIN D3) 50 mcg, oral, Daily    docusate sodium (COLACE) 100 mg, oral, 2 times daily PRN    metoprolol succinate XL (TOPROL-XL) 12.5 mg, oral, Daily, Do not crush or chew.    multivitamin with minerals tablet 1 tablet, oral, Daily    polyethylene glycol (GLYCOLAX, MIRALAX) 17 g, oral, Daily PRN    rosuvastatin (CRESTOR) 20 mg, oral, Daily     Impressions and Plan:    Pt is a 68 year old man found to have high CAC and severe 2 vessel CAD who is now s/p CA BG ( LIMA_LAD, RA-PDA) 1/4/2024 who returns virtually to go over testing results and discuss  future rx. He is doing quite well and is active without any cardiac sx and is tolerating his statin ( rosuvastatin 20 mg ) well without myalgias. He is due for repeat lipid panel to see if at target. In January he was near but not at target ( target LDL < 55) but has been more active since that time. His LVEF prior to surgery was 35-40% and upon  recheck, echo showed stable LVEF following revascularization. Still with LVEF 35-40%. Will reassess LVEF as well as scar burden by cardiac MRI. Will stepwise try to initiate CHF medical  regimen to optimize LV function. He already  has some postural dizziness on his current medical regimen.Will continue his metoprolol succinate ( only 12.5 mg daily and will add farxiga and recheck BMP in 2 weeks. Will then try to add on Entresto to see if tolerated. ( Previously did not tolerate being on low dose losartan with the low dose metoprolol due to low BP and dizziness.   Plan  1. CAD- s/p CABG- continue aspirin and metoprolol and statin   2. Persistently reduced LVEF from CAD- reassess LVEF as well as assess for scar burden with cardiac MRI. Continue metoprolol and will add farxiga. At next visit will see if we can add entresto.   3. Dyslipidemia- Target LDL < 55. Recheck lipids . Currently rosuvastatin 20 mg daily   4. Continue current level of exercise.      Return to clinic a already scheduled in 3 months. Will schedule virtual visit to go over cardiac MRI once completed.     Patient Instructions:  If you have any questions or need cardiac medication refills, please call my office at [phone],      To reach my office please call [phone]  To schedule an appointment call [phone].          ____________________________________________________________  Michelle Jimenez MD  Division of Cardiovascular Medicine  [facility] [place]  [facility]

## 2024-07-22 NOTE — PATIENT INSTRUCTIONS
CAD, s/p CABG- continue aspirin metoprolol and rosuvastatin 20 mg daily. Recheck of LVEF by echo shows still 35-40% with apical wall motion abnormality. Will reassess LVEF as well as scar burden with cardiac MRI ( to be done at [facility])   Dyslipidemia- continue rosuvastatin 20 mg daily with LDL target < 55. Due for recheck of lipids  Continue current  level of exercise.  Reduced LVEF- to continue metoprolol succinate 12.5 mg daily and will add farxiga. To check BNP and will recheck BMP in 2 weeks. Will reassess with virtual visit when cardiac MRI is done. Will try to add low dose entresto at next visit though pt previously did not tolerate low dose losartan in addition to the  metoprolol due to low Bp and dizziness.   Return to clinic in 3 months

## 2024-07-25 ENCOUNTER — PHARMACY VISIT (OUTPATIENT)
Dept: PHARMACY | Facility: CLINIC | Age: 69
End: 2024-07-25
Payer: MEDICARE

## 2024-08-22 ENCOUNTER — APPOINTMENT (OUTPATIENT)
Dept: PRIMARY CARE | Facility: CLINIC | Age: 69
End: 2024-08-22
Payer: COMMERCIAL

## 2024-08-22 ENCOUNTER — LAB (OUTPATIENT)
Dept: LAB | Facility: LAB | Age: 69
End: 2024-08-22
Payer: COMMERCIAL

## 2024-08-22 VITALS
WEIGHT: 194 LBS | DIASTOLIC BLOOD PRESSURE: 75 MMHG | HEIGHT: 72 IN | BODY MASS INDEX: 26.28 KG/M2 | HEART RATE: 57 BPM | SYSTOLIC BLOOD PRESSURE: 121 MMHG | TEMPERATURE: 97.3 F

## 2024-08-22 DIAGNOSIS — Z12.5 PROSTATE CANCER SCREENING: ICD-10-CM

## 2024-08-22 DIAGNOSIS — I25.10 ATHEROSCLEROSIS OF NATIVE CORONARY ARTERY OF NATIVE HEART WITHOUT ANGINA PECTORIS: ICD-10-CM

## 2024-08-22 DIAGNOSIS — K90.0 CELIAC DISEASE (HHS-HCC): ICD-10-CM

## 2024-08-22 DIAGNOSIS — Z00.00 ENCOUNTER FOR PREVENTATIVE ADULT HEALTH CARE EXAMINATION: ICD-10-CM

## 2024-08-22 DIAGNOSIS — Z00.00 ENCOUNTER FOR PREVENTATIVE ADULT HEALTH CARE EXAMINATION: Primary | ICD-10-CM

## 2024-08-22 DIAGNOSIS — I51.9 LEFT VENTRICULAR DYSFUNCTION: ICD-10-CM

## 2024-08-22 DIAGNOSIS — K52.832 FOCAL LYMPHOCYTIC COLITIS: ICD-10-CM

## 2024-08-22 DIAGNOSIS — E78.5 DYSLIPIDEMIA: ICD-10-CM

## 2024-08-22 DIAGNOSIS — I50.20 SYSTOLIC HEART FAILURE, UNSPECIFIED HF CHRONICITY (MULTI): ICD-10-CM

## 2024-08-22 PROBLEM — M54.30 SCIATICA: Status: RESOLVED | Noted: 2023-10-11 | Resolved: 2024-08-22

## 2024-08-22 PROBLEM — H01.009 BLEPHARITIS: Status: RESOLVED | Noted: 2023-10-11 | Resolved: 2024-08-22

## 2024-08-22 PROBLEM — L82.0 INFLAMED SEBORRHEIC KERATOSIS: Status: RESOLVED | Noted: 2018-05-15 | Resolved: 2024-08-22

## 2024-08-22 PROBLEM — L81.9 PIGMENTED SKIN LESION: Status: RESOLVED | Noted: 2023-10-11 | Resolved: 2024-08-22

## 2024-08-22 PROBLEM — K21.9 GASTROESOPHAGEAL REFLUX DISEASE: Status: RESOLVED | Noted: 2023-10-11 | Resolved: 2024-08-22

## 2024-08-22 PROBLEM — D18.01 HEMANGIOMA OF SKIN AND SUBCUTANEOUS TISSUE: Status: RESOLVED | Noted: 2018-05-15 | Resolved: 2024-08-22

## 2024-08-22 PROBLEM — L81.4 LENTIGINOSIS: Status: RESOLVED | Noted: 2018-05-15 | Resolved: 2024-08-22

## 2024-08-22 PROBLEM — L98.9 SKIN LESION: Status: RESOLVED | Noted: 2023-10-11 | Resolved: 2024-08-22

## 2024-08-22 PROBLEM — R19.5 LOOSE STOOLS: Status: RESOLVED | Noted: 2023-10-11 | Resolved: 2024-08-22

## 2024-08-22 LAB
25(OH)D3 SERPL-MCNC: 64 NG/ML (ref 30–100)
ANION GAP SERPL CALC-SCNC: 11 MMOL/L (ref 10–20)
BASOPHILS # BLD AUTO: 0.04 X10*3/UL (ref 0–0.1)
BASOPHILS NFR BLD AUTO: 0.9 %
BUN SERPL-MCNC: 19 MG/DL (ref 6–23)
CALCIUM SERPL-MCNC: 9.4 MG/DL (ref 8.6–10.6)
CHLORIDE SERPL-SCNC: 105 MMOL/L (ref 98–107)
CHOLEST SERPL-MCNC: 133 MG/DL (ref 0–199)
CHOLESTEROL/HDL RATIO: 2.1
CO2 SERPL-SCNC: 29 MMOL/L (ref 21–32)
CREAT SERPL-MCNC: 0.99 MG/DL (ref 0.5–1.3)
EGFRCR SERPLBLD CKD-EPI 2021: 84 ML/MIN/1.73M*2
EOSINOPHIL # BLD AUTO: 0.12 X10*3/UL (ref 0–0.7)
EOSINOPHIL NFR BLD AUTO: 2.6 %
ERYTHROCYTE [DISTWIDTH] IN BLOOD BY AUTOMATED COUNT: 13.8 % (ref 11.5–14.5)
EST. AVERAGE GLUCOSE BLD GHB EST-MCNC: 114 MG/DL
FERRITIN SERPL-MCNC: 90 NG/ML (ref 20–300)
GLUCOSE SERPL-MCNC: 85 MG/DL (ref 74–99)
HBA1C MFR BLD: 5.6 %
HCT VFR BLD AUTO: 37.9 % (ref 41–52)
HDLC SERPL-MCNC: 62.4 MG/DL
HGB BLD-MCNC: 12 G/DL (ref 13.5–17.5)
IMM GRANULOCYTES # BLD AUTO: 0.01 X10*3/UL (ref 0–0.7)
IMM GRANULOCYTES NFR BLD AUTO: 0.2 % (ref 0–0.9)
IRON SATN MFR SERPL: 22 % (ref 25–45)
IRON SERPL-MCNC: 76 UG/DL (ref 35–150)
LDLC SERPL CALC-MCNC: 54 MG/DL
LYMPHOCYTES # BLD AUTO: 1.81 X10*3/UL (ref 1.2–4.8)
LYMPHOCYTES NFR BLD AUTO: 38.6 %
MCH RBC QN AUTO: 28 PG (ref 26–34)
MCHC RBC AUTO-ENTMCNC: 31.7 G/DL (ref 32–36)
MCV RBC AUTO: 89 FL (ref 80–100)
MONOCYTES # BLD AUTO: 0.36 X10*3/UL (ref 0.1–1)
MONOCYTES NFR BLD AUTO: 7.7 %
NEUTROPHILS # BLD AUTO: 2.35 X10*3/UL (ref 1.2–7.7)
NEUTROPHILS NFR BLD AUTO: 50 %
NON HDL CHOLESTEROL: 71 MG/DL (ref 0–149)
NRBC BLD-RTO: 0 /100 WBCS (ref 0–0)
PLATELET # BLD AUTO: 194 X10*3/UL (ref 150–450)
POTASSIUM SERPL-SCNC: 4.6 MMOL/L (ref 3.5–5.3)
PSA SERPL-MCNC: 0.61 NG/ML
RBC # BLD AUTO: 4.28 X10*6/UL (ref 4.5–5.9)
SODIUM SERPL-SCNC: 140 MMOL/L (ref 136–145)
T4 FREE SERPL-MCNC: 0.94 NG/DL (ref 0.78–1.48)
TIBC SERPL-MCNC: 338 UG/DL (ref 240–445)
TRIGL SERPL-MCNC: 84 MG/DL (ref 0–149)
TSH SERPL-ACNC: 4.27 MIU/L (ref 0.44–3.98)
UIBC SERPL-MCNC: 262 UG/DL (ref 110–370)
VIT B12 SERPL-MCNC: 698 PG/ML (ref 211–911)
VLDL: 17 MG/DL (ref 0–40)
WBC # BLD AUTO: 4.7 X10*3/UL (ref 4.4–11.3)

## 2024-08-22 PROCEDURE — 36415 COLL VENOUS BLD VENIPUNCTURE: CPT

## 2024-08-22 PROCEDURE — 1160F RVW MEDS BY RX/DR IN RCRD: CPT | Performed by: INTERNAL MEDICINE

## 2024-08-22 PROCEDURE — 82607 VITAMIN B-12: CPT

## 2024-08-22 PROCEDURE — 1157F ADVNC CARE PLAN IN RCRD: CPT | Performed by: INTERNAL MEDICINE

## 2024-08-22 PROCEDURE — 1159F MED LIST DOCD IN RCRD: CPT | Performed by: INTERNAL MEDICINE

## 2024-08-22 PROCEDURE — 83550 IRON BINDING TEST: CPT

## 2024-08-22 PROCEDURE — 83540 ASSAY OF IRON: CPT

## 2024-08-22 PROCEDURE — 3008F BODY MASS INDEX DOCD: CPT | Performed by: INTERNAL MEDICINE

## 2024-08-22 PROCEDURE — 85025 COMPLETE CBC W/AUTO DIFF WBC: CPT

## 2024-08-22 PROCEDURE — 84443 ASSAY THYROID STIM HORMONE: CPT

## 2024-08-22 PROCEDURE — 82728 ASSAY OF FERRITIN: CPT

## 2024-08-22 PROCEDURE — 84439 ASSAY OF FREE THYROXINE: CPT

## 2024-08-22 PROCEDURE — 83036 HEMOGLOBIN GLYCOSYLATED A1C: CPT

## 2024-08-22 PROCEDURE — 99397 PER PM REEVAL EST PAT 65+ YR: CPT | Performed by: INTERNAL MEDICINE

## 2024-08-22 PROCEDURE — 82306 VITAMIN D 25 HYDROXY: CPT

## 2024-08-22 PROCEDURE — 1036F TOBACCO NON-USER: CPT | Performed by: INTERNAL MEDICINE

## 2024-08-22 PROCEDURE — 80061 LIPID PANEL: CPT

## 2024-08-22 PROCEDURE — 80048 BASIC METABOLIC PNL TOTAL CA: CPT

## 2024-08-22 PROCEDURE — 83880 ASSAY OF NATRIURETIC PEPTIDE: CPT

## 2024-08-22 PROCEDURE — 84153 ASSAY OF PSA TOTAL: CPT

## 2024-08-22 NOTE — PATIENT INSTRUCTIONS
Samir,   I have ordered blood and/or urine tests for you to do today. The lab can be found on this floor (2nd floor) next to the pharmacy across from the elevators.    Book recommendation - Outlive, by Denzel Nj     See you in 1 year.

## 2024-08-22 NOTE — ASSESSMENT & PLAN NOTE
S/p 2V CABG on appropriate medical management, followed by cardiology maintaining extremely active lifestyle.        Followup 1 year

## 2024-08-22 NOTE — ASSESSMENT & PLAN NOTE
On appropriate medical management followed by cardiology pending cardiac MRI for further workup  Intolerant to Farxiga, may consider Entresto
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
## 2024-08-22 NOTE — ASSESSMENT & PLAN NOTE
Adherent to gluten-free diet, screen for nutritional deficiency  Orders:    Ferritin; Future    Iron and TIBC; Future    Vitamin B12; Future

## 2024-08-23 LAB — BNP SERPL-MCNC: 60 PG/ML (ref 0–99)

## 2024-08-26 DIAGNOSIS — Z00.00 ENCOUNTER FOR PREVENTATIVE ADULT HEALTH CARE EXAMINATION: ICD-10-CM

## 2024-08-26 DIAGNOSIS — D50.9 IRON DEFICIENCY ANEMIA, UNSPECIFIED IRON DEFICIENCY ANEMIA TYPE: Primary | ICD-10-CM

## 2024-09-05 ENCOUNTER — HOSPITAL ENCOUNTER (OUTPATIENT)
Dept: RADIOLOGY | Facility: HOSPITAL | Age: 69
Discharge: HOME | End: 2024-09-05
Payer: COMMERCIAL

## 2024-09-05 VITALS — BODY MASS INDEX: 26.28 KG/M2 | WEIGHT: 194 LBS | HEIGHT: 72 IN

## 2024-09-05 DIAGNOSIS — I51.9 LEFT VENTRICULAR DYSFUNCTION: ICD-10-CM

## 2024-09-05 PROCEDURE — 75561 CARDIAC MRI FOR MORPH W/DYE: CPT

## 2024-09-05 PROCEDURE — A9575 INJ GADOTERATE MEGLUMI 0.1ML: HCPCS | Performed by: INTERNAL MEDICINE

## 2024-09-05 PROCEDURE — 2550000001 HC RX 255 CONTRASTS: Performed by: INTERNAL MEDICINE

## 2024-09-05 RX ORDER — GADOTERATE MEGLUMINE 376.9 MG/ML
35 INJECTION INTRAVENOUS
Status: COMPLETED | OUTPATIENT
Start: 2024-09-05 | End: 2024-09-05

## 2024-09-15 NOTE — PROGRESS NOTES
Subjective   Patient ID: Samir Howell is a 68 y.o. male who presents for mucous    HPI   The patient reports a history of a cough x 10 days.  He reports that the cough initially was productive of green sputum but recently has been productive of clear sputum.  He reports a history of fever and chills x 2 to 3 days beginning 8 to 10 days ago.  He also reports a history of a sore throat x 5 days beginning 8 to 10 days ago.  He does report a history of intermittent episodes of wheezing as well x 10 days.  Over the past few days, he does report a decrease in the frequency of cough, decreased sputum production as well.  He reports no shortness of breath, chest pain, palpitations, orthopnea, PND, lower extremity edema, nasal congestion, rhinorrhea, postnasal drip, sneezing, sore throat, nausea, vomiting, abdominal pain, diarrhea, anorexia.  The patient did not do any COVID-19 testing.    Review of Systems    Objective   There were no vitals taken for this visit.    Physical Exam  Head-palpation revealed no tenderness over the maxillary or frontal sinuses  Nose-turbinates not erythematous or swollen, nasal septal deviation noted to the right  Mouth-posterior pharynx not erythematous, tonsillar pillars appeared normal, no exudates  Neck-no lymphadenopathy.  Lungs-clear.  Cardiac-heart sounds distant, rate normal, rhythm regular, positive S4 noted, no murmurs, no JVD.  Abdomen-soft, nondistended. Normal active bowel sounds. Palpation revealed no tenderness or masses  Extremities-no peripheral edema  Assessment/Plan   Problem List Items Addressed This Visit             ICD-10-CM    Focal lymphocytic colitis K52.832    Celiac disease ([facility]) K90.0    Atherosclerosis of coronary artery I25.10    Dyslipidemia E78.5    Systolic heart failure, unspecified HF chronicity (Multi) - Primary I50.20        Assessment  Intermittent episodes of wheezing-May be secondary to bronchospasm secondary syndrome, bronchitis,,   COVID-19  Cough,-May be secondary to viral syndrome, bronchitis, , COVID-19  Plan  After great deal of discussion, the patient and I have elected to hold off on further testing at this time.  We have elected to treat the patient's symptoms with Robitussin DM 10 mL p.o. every 6 hours as needed cough.  At this time, the patient reports that the wheezing is infrequent enough that he does not wish to begin use of a beta agonist inhaler.  He will call me in 5 days with his condition or sooner if he notices shortness of breath, an increase in the frequency of cough, increased sputum production, recurrence of purulent sputum or purulent nasal discharge

## 2024-09-16 ENCOUNTER — APPOINTMENT (OUTPATIENT)
Dept: PRIMARY CARE | Facility: CLINIC | Age: 69
End: 2024-09-16
Payer: COMMERCIAL

## 2024-09-16 VITALS
SYSTOLIC BLOOD PRESSURE: 106 MMHG | DIASTOLIC BLOOD PRESSURE: 86 MMHG | WEIGHT: 193.4 LBS | BODY MASS INDEX: 26.48 KG/M2 | HEART RATE: 64 BPM

## 2024-09-16 DIAGNOSIS — I25.10 ATHEROSCLEROSIS OF NATIVE CORONARY ARTERY OF NATIVE HEART WITHOUT ANGINA PECTORIS: ICD-10-CM

## 2024-09-16 DIAGNOSIS — I50.20 SYSTOLIC HEART FAILURE, UNSPECIFIED HF CHRONICITY: Primary | ICD-10-CM

## 2024-09-16 DIAGNOSIS — E78.5 DYSLIPIDEMIA: ICD-10-CM

## 2024-09-16 DIAGNOSIS — K52.832 FOCAL LYMPHOCYTIC COLITIS: ICD-10-CM

## 2024-09-16 DIAGNOSIS — K90.0 CELIAC DISEASE (HHS-HCC): ICD-10-CM

## 2024-09-16 PROCEDURE — 1159F MED LIST DOCD IN RCRD: CPT | Performed by: INTERNAL MEDICINE

## 2024-09-16 PROCEDURE — 1157F ADVNC CARE PLAN IN RCRD: CPT | Performed by: INTERNAL MEDICINE

## 2024-09-16 PROCEDURE — 99212 OFFICE O/P EST SF 10 MIN: CPT | Performed by: INTERNAL MEDICINE

## 2024-09-16 PROCEDURE — 1160F RVW MEDS BY RX/DR IN RCRD: CPT | Performed by: INTERNAL MEDICINE

## 2024-09-16 RX ORDER — FERROUS SULFATE 325(65) MG
65 TABLET, DELAYED RELEASE (ENTERIC COATED) ORAL EVERY OTHER DAY
COMMUNITY

## 2024-10-28 ENCOUNTER — APPOINTMENT (OUTPATIENT)
Dept: CARDIOLOGY | Facility: HOSPITAL | Age: 69
End: 2024-10-28
Payer: COMMERCIAL

## 2024-11-14 ENCOUNTER — OFFICE VISIT (OUTPATIENT)
Dept: CARDIOLOGY | Facility: HOSPITAL | Age: 69
End: 2024-11-14
Payer: COMMERCIAL

## 2024-11-14 VITALS
HEART RATE: 60 BPM | OXYGEN SATURATION: 100 % | HEIGHT: 71 IN | DIASTOLIC BLOOD PRESSURE: 70 MMHG | WEIGHT: 192.8 LBS | BODY MASS INDEX: 26.99 KG/M2 | SYSTOLIC BLOOD PRESSURE: 115 MMHG

## 2024-11-14 DIAGNOSIS — I51.9 LV DYSFUNCTION: ICD-10-CM

## 2024-11-14 DIAGNOSIS — Z95.1 S/P CABG (CORONARY ARTERY BYPASS GRAFT): ICD-10-CM

## 2024-11-14 DIAGNOSIS — E78.5 DYSLIPIDEMIA: ICD-10-CM

## 2024-11-14 DIAGNOSIS — I77.810 ASCENDING AORTA DILATATION (CMS-HCC): Primary | ICD-10-CM

## 2024-11-14 PROCEDURE — 3008F BODY MASS INDEX DOCD: CPT | Performed by: INTERNAL MEDICINE

## 2024-11-14 PROCEDURE — 1159F MED LIST DOCD IN RCRD: CPT | Performed by: INTERNAL MEDICINE

## 2024-11-14 PROCEDURE — 1157F ADVNC CARE PLAN IN RCRD: CPT | Performed by: INTERNAL MEDICINE

## 2024-11-14 PROCEDURE — 99417 PROLNG OP E/M EACH 15 MIN: CPT | Performed by: INTERNAL MEDICINE

## 2024-11-14 PROCEDURE — 99214 OFFICE O/P EST MOD 30 MIN: CPT | Performed by: INTERNAL MEDICINE

## 2024-11-14 PROCEDURE — 1160F RVW MEDS BY RX/DR IN RCRD: CPT | Performed by: INTERNAL MEDICINE

## 2024-11-14 NOTE — PATIENT INSTRUCTIONS
CAD, s/p CABG- continue aspirin metoprolol and rosuvastatin 20 mg daily. LVEF improved on recent MRI to 47%. No indication at this time for ICD.  Dyslipidemia- continue rosuvastatin 20 mg daily with LDL target < 55. Was at target August 2024.   Continue current  level of exercise.  Dizziness upon getting up- please be sure to remain hydrated and consider support socks.  Return to clinic in 6 months.   Please call to schedule the stress test  Dr. Aguilar's recommendation is to start Eliquis twice daily at this time.   Please follow up with the Device RN in 1 month to assess the atrial fibrillation burden.

## 2024-11-14 NOTE — PROGRESS NOTES
Primary Care Physician: Shey Nguyễn DO      Date of Visit: 11/14/2024  8:40 AM EST  Location of visit: [facility]   Type of Visit: Established Patient     HPI / Summary:   Jones Howell is a very pleasant 69 y.o. male who was found to have a high CAC ( 775) and reduced LV EF ( 35-40%) on cath found to have multivessel CAD who is s/p CABG x 2 ( LIMA-LAD, RA-RPDA, by MARY GRACE Jimenez  January 4 2024) who reurns for followup     He has no significant past medical history except borderline lipids and had  CT for CAC 6/22/2023: total 775.15:  LM =o,  .62,  Lcx 3.56,  .97. Ascending aorta 3.7 c,  also mildly dilated R PA and main PA and L PA, raising concern for possibly elevated PA pressures.   CAD risk facors: no Fhx, no HTN, borderline lipids, never smoked and no DM.   Cardiac echo done 11/8/2023: technically difficult study , but LVEF 35-40% with regional wall motion abnormalities, but recommended limited repeat with use of echo-contrast due to sub-optimal endocardial definition.   Pt had been an avid runner and was asymptomatic, but after findings significant CAD was asked to stop running for now. He remains asymptomatic with ADLs and his current home Bps run 120s/70smmHg He is on rosuvastatin 20 mg daily without any myalgias. At a prior visit started losartan 25 mg daily due to low LVEF. Will not adjust at this time. Following surgery will reassess LV systolic function. After that can adjust CHF med regimen.   Cardiac cath 12/4/2023: severe 2 vessel CAD. Elevated LVEDP.    Coronary Lesion Summary:  Vessel         Stenosis   Vessel Segment  LAD          80% stenosis proximal to mid  LAD          80% stenosis       mid  1st Diagonal 40% stenosis    proximal  Circumflex   30% stenosis    proximal  Ramus        40% stenosis    proximal  RCA          99% stenosis       mid  RPDA         90% stenosis    proximal  1/4/2024: CABG x 2   MARY GRACE Jimenez ( ODOM-LA, RA-PDA) He did well post op.  Repeat echo on  7/8/2024- still showed LVEF 35-40% with persistent apical wall motion abnormality. Insufficient TR to estimate RVSP. This was unchanged from the preop LV function.      MRI 9/5/2024: LVEF 47% with scarring in apex and mid anteroseptal segments consistent with prior LAD territory MI. No significant valvular pathology.     Not checking BP at home often, when checks every couple of weeks runs 115/70s mmHg. He walks couple of miles daily, runs 3.5 miles 2 x per week and swims 1 mile on sat and bikes 15-20 miles on Sunday. At this level of exercise no CP or SOB. Denies any palpitations presyncope or syncope. No myalgias on statin.    ROS: Full 10 pt review of symptoms of negative unless discussed above.     Problems:   Patient Active Problem List   Diagnosis    Focal lymphocytic colitis    Celiac disease (HHS-HCC)    Anisometropia    Benign positional vertigo    Contact dermatitis due to poison ivy    ED (erectile dysfunction) of organic origin    Lactose intolerance    Benign neoplasm of skin of right lower extremity    Melanocytic nevus of trunk    Nuclear sclerotic cataract    Melanocytic nevus of left lower extremity    Other melanin hyperpigmentation    Actinic keratosis    Skin changes due to chronic exposure to nonionizing radiation, unspecified    Pseudophakia of right eye    Vertigo    Vitamin D deficiency    Melanocytic nevi of right upper limb, including shoulder    Melanocytic nevus of right upper extremity    Melanocytic nevi of right lower limb, including hip    Melanocytic nevi of left lower limb, including hip    Atherosclerosis of coronary artery    LV dysfunction    Neoplasm of uncertain behavior of skin    Dyslipidemia    Colitis    Ascending aorta dilatation (CMS-HCC)    Systolic heart failure, unspecified HF chronicity    S/P CABG (coronary artery bypass graft)     Medical History:   Past Medical History:   Diagnosis Date    Acute upper respiratory infection 01/29/2024    Cataract     Celiac disease  "([facility]-HCC)     Colitis     Coronary artery disease 12/04/2023    Severe 2 vessel CAD in a right dominant system.  Elevated LVEDP.  No evidence of aortic stenosis.    Diarrhea 01/29/2024    Hyperlipidemia     Lymphocytic colitis     Other male erectile dysfunction 02/06/2023    Vertigo 10/11/2023     Surgical Hx:   Past Surgical History:   Procedure Laterality Date    CARDIAC CATHETERIZATION N/A 12/04/2023    Procedure: Left Heart Cath, With LV;  Surgeon: Javier Ma MD;  Location: [facility];  Service: Cardiovascular;  Laterality: N/A;    LUMBAR SPINE SURGERY        Social Hx:   Tobacco Use: Low Risk  (11/14/2024)    Patient History     Smoking Tobacco Use: Never     Smokeless Tobacco Use: Never     Passive Exposure: Not on file     Alcohol Use: Not At Risk (2/5/2023)    [facility]     Frequency of Alcohol Consumption: 2-4 times a month     Average Number of Drinks: 1 or 2     Frequency of Binge Drinking: Never     Family Hx:   Family History   Problem Relation Name Age of Onset    Alzheimer's disease Mother  80    Other (COVID) Father  90      Exam:   Vitals:   Vitals:    11/14/24 0838   BP: 115/70   BP Location: Left arm   Patient Position: Sitting   Pulse: 60   SpO2: 100%   Weight: 87.5 kg (192 lb 12.8 oz)   Height: 1.803 m (5' 11\")     Wt Readings from Last 5 Encounters:   11/14/24 87.5 kg (192 lb 12.8 oz)   09/16/24 87.7 kg (193 lb 6.4 oz)   09/05/24 88 kg (194 lb 0.1 oz)   08/22/24 88 kg (194 lb)   06/20/24 88.7 kg (195 lb 8 oz)      Constitutional:       Appearance: Healthy appearance. Not in distress.   Pulmonary:      Effort: Pulmonary effort is normal.      Breath sounds: Normal breath sounds.   Cardiovascular:      PMI at left midclavicular line. Normal rate. Regular rhythm. S1 with normal intensity. S2 with normal intensity.       Murmurs: There is no murmur.   Pulses:     Intact distal pulses.   Edema:     Peripheral edema absent. "   Neurological:      Mental Status: Alert and oriented to person, place, and time.     Labs:   Recent Labs     08/22/24  1003 01/17/24  1200 01/09/24  0848 01/08/24  1043 01/07/24  0719 01/06/24  1048 01/05/24  0207 01/04/24  1427   WBC 4.7 8.6 7.5 5.8 7.9 12.4* 9.2 16.8*   HGB 12.0* 10.1* 10.1* 9.0* 10.0* 9.7* 10.6* 10.8*   HCT 37.9* 31.1* 31.3* 28.0* 30.1* 29.4* 32.2* 32.6*    381 264 187 181 177 157 192   MCV 89 89 88 89 87 89 87 87     Recent Labs     08/22/24  1003 07/08/24  0930 01/17/24  1200 01/09/24  0848 01/08/24  1043 01/07/24  0719 01/06/24  1048 01/05/24  0207    138 141 139 139 138 132* 137   K 4.6 4.2 4.6 3.9 3.7 3.7 4.0 5.1    102 103 101 104 102 99 105   BUN 19 17 15 9 10 11 17 15   CREATININE 0.99 1.01 0.92 0.94 0.78 0.83 0.94 0.78      Recent Labs     08/22/24  1003 11/30/23  0838 10/19/23  0814   HGBA1C 5.6  --  5.3   BNP 60 17  --    FERRITIN 90  --  211   TIBC 338  --  349   IRONSAT 22*  --  25     Lab Results   Component Value Date    CHOL 133 08/22/2024    HDL 62.4 08/22/2024    LDLF 122 (H) 11/24/2021    TRIG 84 08/22/2024     Lab Results   Component Value Date    LDLCALC 54 08/22/2024      Notable Studies: imaging personally reviewed and summarized by me below  EKG:  Encounter Date: 06/20/24   ECG 12 lead (Clinic Performed)   Result Value    Ventricular Rate 65    Atrial Rate 65    PA Interval 202    QRS Duration 80    QT Interval 382    QTC Calculation(Bazett) 397    P Axis 57    R Axis 87    T Axis 77    QRS Count 10    Q Onset 224    P Onset 123    P Offset 182    T Offset 415    QTC Fredericia 392    Narrative    Normal sinus rhythm with 1st degree AV block  Abnormal ECG  When compared with ECG of 13-NOV-2023 08:20,  Nonspecific T wave abnormality now evident in Lateral leads  Confirmed by Young Triplett ([id number]) on 6/30/2024 4:43:26 PM       Catheterization:  - [facility] (12/4/2023)  Coronary Lesion Summary:  Vessel         Stenosis   Vessel Segment  LAD          80% stenosis  proximal to mid  LAD          80% stenosis       mid  1st Diagonal 40% stenosis    proximal  Circumflex   30% stenosis    proximal  Ramus        40% stenosis    proximal  RCA          99% stenosis       mid  RPDA         90% stenosis    proximal  CONCLUSIONS:   1. Severe 2 vessel CAD in a right dominant system.   2. Elevated LVEDP.   3. No evidence of aortic stenosis      Cardiac echo 7/8/2024:   PHYSICIAN INTERPRETATION:  Left Ventricle: Left ventricular ejection fraction is moderately decreased, by visual estimate at 35-40%. Wall motion is abnormal. The left ventricular cavity size is normal. Abnormal (paradoxical) septal motion consistent with post-operative status. Spectral Doppler shows a normal pattern of left ventricular diastolic filling. There is no definite left ventricular thrombus visualized. Technically difficult apical views, espically with foreshortening of the 2 chamber views. Overall appears to have deuced LV systolic dysfunction with apical hypokinesis with LVEF approx 38%.  Left Atrium: The left atrium is normal in size.  Right Ventricle: The right ventricle is normal in size. There is mildly reduced right ventricular systolic function.  Right Atrium: The right atrium is mildly dilated.  Aortic Valve: The aortic valve is trileaflet. The aortic valve dimensionless index is 0.61. There is no evidence of aortic valve regurgitation. The peak instantaneous gradient of the aortic valve is 2.6 mmHg. The mean gradient of the aortic valve is 1.0 mmHg.  Mitral Valve: The mitral valve is normal in structure. There is trace mitral valve regurgitation.  Tricuspid Valve: The tricuspid valve is structurally normal. There is trace tricuspid regurgitation. The Doppler estimated RVSP is within normal limits at 22.5 mmHg.  Pulmonic Valve: The pulmonic valve is not well visualized. There is physiologic pulmonic valve regurgitation.  Pericardium: There is no pericardial effusion noted.  Aorta: The aortic root is  normal. The aortic root is at the upper limits of normal size.  Systemic Veins: The inferior vena cava appears to be of normal size.  In comparison to the previous echocardiogram(s): Compared with study dated 11/13/2023,. Compared with the prior exams from 11/8/2023 and 11/13/2023 ( both preop from CABG) there is similar LV systolic function with a similar apical wall motion abnormality ( apical hypokinesis. ).        CONCLUSIONS:   1. Left ventricular ejection fraction is moderately decreased, by visual estimate at 35-40%.   2. Abnormal septal motion consistent with post-operative status.   3. No left ventricular thrombus visualized.   4. Technically difficult apical views, espically with foreshortening of the 2 chamber views. Overall appears to have deuced LV systolic dysfunction with apical hypokinesis with LVEF approx 38%.   5. There is mildly reduced right ventricular systolic function.   6. RVSP within normal limits.   7. Compared with the prior exams from 11/8/2023 and 11/13/2023 ( both preop from CABG) there is similar LV systolic function with a similar apical wall motion abnormality ( apical hypokinesis. ).       Cardiac MRI 9/5/2024  IMPRESSION:  1. Mildly dilated LV (EDVi 104 ml/m2) with mildly impaired systolic  function (LVEF 47%).  2. The mid anteroseptal and apical anterior/septal segments are  severely hypokinetic.  3. Moderate left atrial dilatation.  4. Near transmural infarction of the mid anteroseptal and apical  anterior/septal segments (LAD territory) on LGE imaging. The affected  segments are non-viable.  5. Normal RV size (EDVi 86 ml/m2) and systolic function (RVEF 53%).      The CMR findings are consistent with ischemic cardiomyopathy.        Current Outpatient Medications   Medication Instructions    aspirin 81 mg, oral, Daily    b complex 0.4 mg tablet 1 tablet, Daily    cholecalciferol (VITAMIN D3) 50 mcg, Daily    ferrous sulfate 65 mg, Every other day    metoprolol succinate XL  (TOPROL-XL) 12.5 mg, oral, Daily, Do not crush or chew.    multivitamin with minerals tablet 1 tablet, oral, Daily    rosuvastatin (CRESTOR) 20 mg, oral, Daily     Impressions and Plan:    Pt is a 69 year old man found to have high CAC and severe 2 vessel CAD who is now s/p CA BG ( LIMA_LAD, RA-PDA) 1/4/2024 who returns virtually to go over testing results and discuss  future rx.r follow up. He is doing quite well and is active without any cardiac sx and is tolerating his statin ( rosuvastatin 20 mg ) well without myalgias. He is at target on this dose.  His LVEF prior to surgery was 35-40% and upon  recheck, echo showed stable LVEF following revascularization. Still with LVEF 35-40%. Cardiac MRI demonstrated LVEF 47% ( somewhat improved) and not at a level requiring ICD. He already  has some postural dizziness on his current medical regimen. Did not tolerate small dose of losartan in the past. Will continue his metoprolol succinate ( only 12.5 mg daily ) Discussed being sure he stays hydrated and may consider support socks given his orthostatic sx  Plan  1. CAD- s/p CABG- continue aspirin and metoprolol and statin   2. Persistently reduced LVEF from CAD- somewhat improved by MRI. Not able to tolerate the smallest dose of losartan previously and already with some orthostatic sx on smallest dose of metoprolol. Pt to try to increase intake of fluids, may consider adding farxiga in the future, but will hold off for now.   3. Dyslipidemia- Target LDL < 55. At target on current dose of rosuvastatin 20 mg daily   4. Continue current level of exercise.   Return to clinic in 6 months.      Patient Instructions:  If you have any questions or need cardiac medication refills, please call my office at [phone],      To reach my office please call [phone]  To schedule an appointment call [phone].          ____________________________________________________________  Michelle Jimenez MD  Division of  Cardiovascular Medicine  [facility] [place]  [facility]

## 2024-11-18 ENCOUNTER — APPOINTMENT (OUTPATIENT)
Dept: DERMATOLOGY | Facility: CLINIC | Age: 69
End: 2024-11-18
Payer: COMMERCIAL

## 2024-11-18 DIAGNOSIS — L82.1 SEBORRHEIC KERATOSIS: ICD-10-CM

## 2024-11-18 DIAGNOSIS — L57.0 ACTINIC KERATOSIS: ICD-10-CM

## 2024-11-18 DIAGNOSIS — D22.5 MELANOCYTIC NEVUS OF TRUNK: ICD-10-CM

## 2024-11-18 DIAGNOSIS — L57.8 DIFFUSE PHOTODAMAGE OF SKIN: ICD-10-CM

## 2024-11-18 DIAGNOSIS — L81.4 LENTIGO: ICD-10-CM

## 2024-11-18 DIAGNOSIS — Z86.018 HISTORY OF DYSPLASTIC NEVUS: ICD-10-CM

## 2024-11-18 DIAGNOSIS — L73.9 FOLLICULITIS: ICD-10-CM

## 2024-11-18 DIAGNOSIS — D48.5 NEOPLASM OF UNCERTAIN BEHAVIOR OF SKIN: Primary | ICD-10-CM

## 2024-11-18 PROCEDURE — 11301 SHAVE SKIN LESION 0.6-1.0 CM: CPT | Performed by: DERMATOLOGY

## 2024-11-18 PROCEDURE — 11302 SHAVE SKIN LESION 1.1-2.0 CM: CPT | Performed by: DERMATOLOGY

## 2024-11-18 PROCEDURE — 1157F ADVNC CARE PLAN IN RCRD: CPT | Performed by: DERMATOLOGY

## 2024-11-18 PROCEDURE — 1159F MED LIST DOCD IN RCRD: CPT | Performed by: DERMATOLOGY

## 2024-11-18 PROCEDURE — 17004 DESTROY PREMAL LESIONS 15/>: CPT | Performed by: DERMATOLOGY

## 2024-11-18 PROCEDURE — 99214 OFFICE O/P EST MOD 30 MIN: CPT | Performed by: DERMATOLOGY

## 2024-11-19 RX ORDER — CLINDAMYCIN PHOSPHATE 10 UG/ML
LOTION TOPICAL 2 TIMES DAILY
Qty: 60 ML | Refills: 11 | Status: SHIPPED | OUTPATIENT
Start: 2024-11-19 | End: 2025-11-19

## 2024-11-20 NOTE — PROGRESS NOTES
"Florentin Howell \"Samir\" is a 69 y.o. male who presents for the following: Skin Check.  He notes recent pimple breakouts on his neck.  He denies any new, changing, or concerning skin lesions since his last visit; no bleeding, itching, or burning lesions.      Review of Systems:  No other skin or systemic complaints other than what is documented elsewhere in the note.    The following portions of the chart were reviewed this encounter and updated as appropriate:       Skin Cancer History  No skin cancer on file.    Specialty Problems          Dermatology Problems    Actinic keratosis    Benign neoplasm of skin of right lower extremity    Melanocytic nevi of left lower limb, including hip    Melanocytic nevi of right lower limb, including hip    Melanocytic nevi of right upper limb, including shoulder    Melanocytic nevus of left lower extremity    Melanocytic nevus of right upper extremity    Melanocytic nevus of trunk    Other melanin hyperpigmentation    Skin changes due to chronic exposure to nonionizing radiation, unspecified    Neoplasm of uncertain behavior of skin       Past Dermatologic / Past Relevant Medical History:    - history of AKs  - moderately dysplastic compound nevus on left lower back diagnosed on 10/12/23 s/p re-shave on 11/20/23  - compound dysplastic nevus with mild atypia on left lower back medial to scar diagnosed on 11/20/23  - no h/o skin cancer    Family History:    No family history of melanoma or skin cancer    Social History:    The patient states he will be retiring from working as a  at the end of this year 2024; he has 3 children and is currently expecting his 2nd grandchild; 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 Do not crush, chew, or split., Disp: , Rfl:     metoprolol succinate XL (Toprol-XL) 25 mg 24 hr tablet, Take 0.5 tablets (12.5 mg) by mouth once daily. Do not crush or chew., Disp: 45 tablet, Rfl: 3    multivitamin with minerals tablet, Take 1 tablet by mouth once daily. Do not start before January 9, 2024., Disp: , Rfl:     rosuvastatin (Crestor) 20 mg tablet, Take 1 tablet (20 mg) by mouth once daily., Disp: 90 tablet, Rfl: 3    clindamycin (Cleocin T) 1 % lotion, Apply topically 2 times a day., Disp: 60 mL, Rfl: 11     Objective   Well appearing patient in no apparent distress; mood and affect are within normal limits.    A full examination was performed including scalp, face, eyes, ears, nose, lips, neck, chest, axillae, abdomen, back, bilateral upper extremities, and bilateral lower extremities. All findings within normal limits unless otherwise noted below.    Assessment/Plan   1. Neoplasm of uncertain behavior of skin (2)  Left Mid-Back  8 mm dark brown pigmented, asymmetric macule with an asymmetric pigment network and irregular borders           Shave removal    Lesion length (cm):  0.8  Margin per side (cm):  0.2  Lesion diameter (cm):  1.2  Informed consent: discussed and consent obtained    Timeout: patient name, date of birth, surgical site, and procedure verified    Procedure prep:  Patient was prepped and draped  Anesthesia: the lesion was anesthetized in a standard fashion    Anesthetic:  1% lidocaine w/ epinephrine 1-100,000 local infiltration  Instrument used: flexible razor blade    Hemostasis achieved with: aluminum chloride    Outcome: patient tolerated procedure well    Post-procedure details: sterile dressing applied and wound care instructions given    Dressing type: bandage and petrolatum      Staff Communication: Dermatology Local  Anesthesia: 1 % Lidocaine / Epinephrine - Amount:0.5ml    Specimen 1 - Dermatopathology- DERM LAB  Differential Diagnosis: DN  Check Margins Yes/No?:    Comments:    Dermpath Lab: Routine Histopathology (formalin-fixed tissue)    Left Posterior Inferior Shoulder  6 mm dark brown pigmented, asymmetric macule with an asymmetric pigment network and irregular borders           Shave removal    Lesion length (cm):  0.6  Margin per side (cm):  0.2  Lesion diameter (cm):  1  Informed consent: discussed and consent obtained    Timeout: patient name, date of birth, surgical site, and procedure verified    Procedure prep:  Patient was prepped and draped  Anesthesia: the lesion was anesthetized in a standard fashion    Anesthetic:  1% lidocaine w/ epinephrine 1-100,000 local infiltration  Instrument used: flexible razor blade    Hemostasis achieved with: aluminum chloride    Outcome: patient tolerated procedure well    Post-procedure details: sterile dressing applied and wound care instructions given    Dressing type: bandage and petrolatum      Staff Communication: Dermatology Local Anesthesia: 1 % Lidocaine / Epinephrine - Amount:0.5ml    Specimen 2 - Dermatopathology- DERM LAB  Differential Diagnosis: DN  Check Margins Yes/No?:    Comments:    Dermpath Lab: Routine Histopathology (formalin-fixed tissue)    2. Actinic keratosis (18)  Head - Anterior (Face) (18)  Scattered on the patient's face, there are multiple erythematous, gritty, scaly macules     Actinic Keratoses - scattered on face.  The pre-cancerous nature of these lesions and treatment options were discussed with the patient today.  At this time, I recommend treatment with liquid nitrogen cryotherapy.  The patient expressed understanding, is in agreement with this plan, and wishes to proceed with cryotherapy today.    Destr of lesion - Head - Anterior (Face) (18)  Complexity: simple    Destruction method: cryotherapy    Informed consent: discussed and consent  obtained    Lesion destroyed using liquid nitrogen: Yes    Cryotherapy cycles:  1  Outcome: patient tolerated procedure well with no complications    Post-procedure details: wound care instructions given      3. Melanocytic nevus of trunk  Scattered on the patient's face, neck, trunk, and extremities, there are multiple small, round- to oval-shaped, brown-pigmented and pink-colored, symmetric, uniform-appearing macules and dome-shaped papules    Clinically benign- to slightly atypical-appearing nevi - the clinically benign- to slightly atypical-appearing nature of the remainder of the patient's nevi was discussed with the patient today.  None of the patient's nevi, with the exception of the 2 noted above, meet threshold for biopsy today.  I emphasized the importance of performing monthly self-skin exams using the ABCDs of monitoring moles, which were reviewed with the patient today and an informational hand-out provided.  I also emphasized the importance of sun avoidance and sun protection with daily sunscreen use.    4. Seborrheic keratosis  Scattered on the patient's face, neck, trunk, and bilateral upper extremities, there are multiple tan- to light brown-colored, hyperkeratotic, stuck-on appearing papules of varying size and shape    Seborrheic Keratoses - the benign nature of these lesions was discussed with the patient today and reassurance provided.  No treatment is medically indicated for these lesions at this time.    5. Lentigo  Photodistributed  Multiple tan- to light brown-colored, round- to oval-shaped, symmetric and uniform-appearing macules and small patches consistent with lentigines scattered in sun-exposed areas.    Solar Lentigines and photodamage.  The clinically benign-appearing nature of these lesions and their relation to chronic sun exposure were discussed with the patient today and reassurance provided.  None of these lesions meet threshold for biopsy today, and thus no treatment is  medically indicated for these lesions at this time.  The signs and symptoms of skin cancer were reviewed and the patient was advised to practice sun protection and sun avoidance, use daily sunscreen, and perform regular self skin exams.  The patient was instructed to monitor these lesions for any changes, such as in size, shape, or color, or associated symptoms and to call our office to schedule a return visit for re-evaluation if any such changes or symptoms are noticed in the future.  The patient expressed understanding and is in agreement with this plan.    6. Folliculitis  Neck - Anterior  Scattered on the patient's neck and back, there are several follicular-based erythematous, inflammatory papules and pustules    Folliculitis -flare on neck and back.  The bacterial nature of this condition and treatment options were discussed with the patient today.  At this time, I recommend topical antibiotic therapy with Clindamycin 1% lotion, which the patient was instructed to apply twice daily to the affected areas or up to 3-4 times per day as needed for active lesions.  The risks, benefits, and side effects of this medication were discussed.  The patient expressed understanding and is in agreement with this plan.    clindamycin (Cleocin T) 1 % lotion - Neck - Anterior  Apply topically 2 times a day.    7. History of dysplastic nevus  On the patient's left lower back and left lower back medial to scar, there are well-healed scars with no evidence of recurrent growth or pigmentation on exam today.    History of dysplastic nevi and actinic keratoses and photodamage.  There is no evidence of recurrence on exam today.  I emphasized the importance of continuing to perform monthly self-skin exams using the ABCDs of monitoring moles, which were reviewed with the patient, as well as the importance of sun avoidance and sun protection with daily sunscreen use.  I will have the patient return to our office in 4 to 6 months,  pending the above biopsy results, for routine follow-up and skin exam, and the patient was instructed to call our office should the patient notice any new, changing, symptomatic, or otherwise concerning skin lesions before then.  The patient expressed understanding and is in agreement with this plan.    8. Diffuse photodamage of skin  Photodistributed  Diffuse photodamage with actinic changes with telangiectasia and mottled pigmentation in sun-exposed areas.    Photodamage.  The signs and symptoms of skin cancer were reviewed and the patient was advised to practice sun protection and sun avoidance, use daily sunscreen, and perform regular self skin exams.  Sun protection was discussed, including avoiding the mid-day sun, wearing a sunscreen with SPF at least 50, and stressing the need for reapplication of sunscreen and applying more than they think they need.

## 2024-12-19 ENCOUNTER — APPOINTMENT (OUTPATIENT)
Dept: CARDIOLOGY | Facility: HOSPITAL | Age: 69
End: 2024-12-19
Payer: COMMERCIAL

## 2025-05-15 ENCOUNTER — OFFICE VISIT (OUTPATIENT)
Dept: CARDIOLOGY | Facility: HOSPITAL | Age: 70
End: 2025-05-15
Payer: MEDICARE

## 2025-05-15 VITALS
DIASTOLIC BLOOD PRESSURE: 71 MMHG | WEIGHT: 189 LBS | OXYGEN SATURATION: 99 % | HEIGHT: 72 IN | BODY MASS INDEX: 25.6 KG/M2 | SYSTOLIC BLOOD PRESSURE: 123 MMHG | HEART RATE: 49 BPM

## 2025-05-15 DIAGNOSIS — I50.20 SYSTOLIC HEART FAILURE, UNSPECIFIED HF CHRONICITY: ICD-10-CM

## 2025-05-15 DIAGNOSIS — Z95.1 S/P CABG X 2: ICD-10-CM

## 2025-05-15 DIAGNOSIS — I51.9 LV DYSFUNCTION: Primary | ICD-10-CM

## 2025-05-15 DIAGNOSIS — E78.5 DYSLIPIDEMIA: ICD-10-CM

## 2025-05-15 DIAGNOSIS — I25.119 CORONARY ARTERY DISEASE WITH ANGINA PECTORIS, UNSPECIFIED VESSEL OR LESION TYPE, UNSPECIFIED WHETHER NATIVE OR TRANSPLANTED HEART: ICD-10-CM

## 2025-05-15 LAB
ATRIAL RATE: 49 BPM
P AXIS: 51 DEGREES
P OFFSET: 169 MS
P ONSET: 111 MS
PR INTERVAL: 226 MS
Q ONSET: 224 MS
QRS COUNT: 8 BEATS
QRS DURATION: 86 MS
QT INTERVAL: 424 MS
QTC CALCULATION(BAZETT): 383 MS
QTC FREDERICIA: 396 MS
R AXIS: 50 DEGREES
T AXIS: 66 DEGREES
T OFFSET: 436 MS
VENTRICULAR RATE: 49 BPM

## 2025-05-15 PROCEDURE — G2211 COMPLEX E/M VISIT ADD ON: HCPCS | Performed by: INTERNAL MEDICINE

## 2025-05-15 PROCEDURE — 93005 ELECTROCARDIOGRAM TRACING: CPT | Performed by: INTERNAL MEDICINE

## 2025-05-15 PROCEDURE — 1160F RVW MEDS BY RX/DR IN RCRD: CPT | Performed by: INTERNAL MEDICINE

## 2025-05-15 PROCEDURE — 99214 OFFICE O/P EST MOD 30 MIN: CPT | Performed by: INTERNAL MEDICINE

## 2025-05-15 PROCEDURE — 99212 OFFICE O/P EST SF 10 MIN: CPT

## 2025-05-15 PROCEDURE — 3008F BODY MASS INDEX DOCD: CPT | Performed by: INTERNAL MEDICINE

## 2025-05-15 PROCEDURE — 1159F MED LIST DOCD IN RCRD: CPT | Performed by: INTERNAL MEDICINE

## 2025-05-15 RX ORDER — ASPIRIN 81 MG/1
81 TABLET ORAL DAILY
COMMUNITY

## 2025-05-15 RX ORDER — ROSUVASTATIN CALCIUM 20 MG/1
20 TABLET, COATED ORAL DAILY
Qty: 90 TABLET | Refills: 3 | Status: SHIPPED | OUTPATIENT
Start: 2025-05-15 | End: 2026-05-15

## 2025-05-15 RX ORDER — METOPROLOL SUCCINATE 25 MG/1
12.5 TABLET, EXTENDED RELEASE ORAL DAILY
Qty: 45 TABLET | Refills: 3 | Status: SHIPPED | OUTPATIENT
Start: 2025-05-15 | End: 2026-05-15

## 2025-05-15 NOTE — PROGRESS NOTES
Primary Care Physician: Shey Nguyễn DO      Date of Visit: 05/15/2025  8:40 AM EDT  Location of visit: [facility]   Type of Visit: Established Patient     HPI / Summary:     Jones Howell is a very pleasant 69 y.o. male who was found to have a high CAC ( 775) and reduced LV EF ( 35-40%) on cath found to have multivessel CAD who is s/p CABG x 2 ( LIMA-LAD, RA-RPDA, by MARY GRACE Jimenez  January 4 2024) who reurns for followup     He has no significant past medical history except borderline lipids and had  CT for CAC 6/22/2023: total 775.15:  LM =o,  .62,  Lcx 3.56,  .97. Ascending aorta 3.7 c,  also mildly dilated R PA and main PA and L PA, raising concern for possibly elevated PA pressures.   CAD risk facors: no Fhx, no HTN, borderline lipids, never smoked and no DM.   Cardiac echo done 11/8/2023: technically difficult study , but LVEF 35-40% with regional wall motion abnormalities, but recommended limited repeat with use of echo-contrast due to sub-optimal endocardial definition.   He is on rosuvastatin 20 mg daily without any myalgias. At a prior visit started losartan 25 mg daily due to low LVEF.   Cardiac cath 12/4/2023: severe 2 vessel CAD. Elevated LVEDP.    Coronary Lesion Summary:  Vessel         Stenosis   Vessel Segment  LAD          80% stenosis proximal to mid  LAD          80% stenosis       mid  1st Diagonal 40% stenosis    proximal  Circumflex   30% stenosis    proximal  Ramus        40% stenosis    proximal  RCA          99% stenosis       mid  RPDA         90% stenosis    proximal  1/4/2024: CABG x 2   MARY GRACE Jimenez ( ODOM-LA, RA-PDA) He did well post op.  Repeat echo on 7/8/2024- still showed LVEF 35-40% with persistent apical wall motion abnormality. Insufficient TR to estimate RVSP. This was unchanged from the preop LV function.      MRI 9/5/2024: LVEF 47% with scarring in apex and mid anteroseptal segments consistent with prior LAD territory MI. No significant valvular pathology.    Pt not checking BP at home. No CP or SOB at rest or on exertion. Very active- running 5k every other day as well as walking 3-4 miles daily with dog. Occasional light headedness if stands up too quickly , though no presyncope or syncope. Pt feels with all of his exercise often may be dehydrated and may need to increase fluid intake. Note given LV dysfunction was previously started on low dose tolerate at all. ( Very low BP and felt terrible). Currently feels quite well.        ROS: Full 10 pt review of symptoms of negative unless discussed above.     Problems:   Problem List[1]  Medical History:   Medical History[2]  Surgical Hx:   Surgical History[3]   Social Hx:   Tobacco Use: Low Risk  (5/15/2025)    Patient History     Smoking Tobacco Use: Never     Smokeless Tobacco Use: Never     Passive Exposure: Not on file     Alcohol Use: Not At Risk (2/5/2023)    Received from [facility]    AUDIT-C     Frequency of Alcohol Consumption: 2-4 times a month     Average Number of Drinks: 1 or 2     Frequency of Binge Drinking: Never     Family Hx:   Family History[4]   Exam:   Vitals:   Vitals:    05/15/25 0841   BP: 123/71   BP Location: Left arm   Patient Position: Sitting   Pulse: (!) 49   SpO2: 99%   Weight: 85.7 kg (189 lb)   Height: 1.829 m (6')     Wt Readings from Last 5 Encounters:   05/15/25 85.7 kg (189 lb)   11/14/24 87.5 kg (192 lb 12.8 oz)   09/16/24 87.7 kg (193 lb 6.4 oz)   09/05/24 88 kg (194 lb 0.1 oz)   08/22/24 88 kg (194 lb)      Constitutional:       Appearance: Healthy appearance. Not in distress.   Pulmonary:      Effort: Pulmonary effort is normal.      Breath sounds: Normal breath sounds.   Cardiovascular:      PMI at left midclavicular line. Bradycardia present. Regular rhythm. S1 with normal intensity. S2 with normal intensity.       Murmurs: There is no murmur.   Pulses:     Intact distal pulses.   Edema:     Peripheral edema absent.   Neurological:      Mental Status: Alert and oriented to  person, place, and time.       Labs:   Recent Labs     08/22/24  1003 01/17/24  1200 01/09/24  0848 01/08/24  1043 01/07/24  0719 01/06/24  1048 01/05/24  0207 01/04/24  1427   WBC 4.7 8.6 7.5 5.8 7.9 12.4* 9.2 16.8*   HGB 12.0* 10.1* 10.1* 9.0* 10.0* 9.7* 10.6* 10.8*   HCT 37.9* 31.1* 31.3* 28.0* 30.1* 29.4* 32.2* 32.6*    381 264 187 181 177 157 192   MCV 89 89 88 89 87 89 87 87     Recent Labs     08/22/24  1003 07/08/24  0930 01/17/24  1200 01/09/24  0848 01/08/24  1043 01/07/24  0719 01/06/24  1048 01/05/24  0207    138 141 139 139 138 132* 137   K 4.6 4.2 4.6 3.9 3.7 3.7 4.0 5.1    102 103 101 104 102 99 105   BUN 19 17 15 9 10 11 17 15   CREATININE 0.99 1.01 0.92 0.94 0.78 0.83 0.94 0.78      Recent Labs     08/22/24  1003 11/30/23  0838 10/19/23  0814   HGBA1C 5.6  --  5.3   BNP 60 17  --    FERRITIN 90  --  211   TIBC 338  --  349   IRONSAT 22*  --  25     Lab Results   Component Value Date    CHOL 133 08/22/2024    HDL 62.4 08/22/2024    LDLF 122 (H) 11/24/2021    TRIG 84 08/22/2024     Lab Results   Component Value Date    LDLCALC 54 08/22/2024      Notable Studies: imaging personally reviewed and summarized by me below  EKG:  Encounter Date: 05/15/25   ECG 12 lead (Clinic Performed)   Result Value    Ventricular Rate 49    Atrial Rate 49    GA Interval 226    QRS Duration 86    QT Interval 424    QTC Calculation(Bazett) 383    P Axis 51    R Axis 50    T Axis 66    QRS Count 8    Q Onset 224    P Onset 111    P Offset 169    T Offset 436    QTC Fredericia 396    Narrative    Marked sinus bradycardia with 1st degree AV block  Possible Left atrial enlargement  Septal infarct (cited on or before 15-MAY-2025)  Abnormal ECG  When compared with ECG of 20-JUN-2024 09:59,  No significant change was found       Catheterization:  - [facility] (12/4/2023)  Coronary Lesion Summary:  Vessel         Stenosis   Vessel Segment  LAD          80% stenosis proximal to mid  LAD          80% stenosis        mid  1st Diagonal 40% stenosis    proximal  Circumflex   30% stenosis    proximal  Ramus        40% stenosis    proximal  RCA          99% stenosis       mid  RPDA         90% stenosis    proximal  CONCLUSIONS:   1. Severe 2 vessel CAD in a right dominant system.   2. Elevated LVEDP.   3. No evidence of aortic stenosis      Cardiac echo 7/8/2024:   PHYSICIAN INTERPRETATION:  Left Ventricle: Left ventricular ejection fraction is moderately decreased, by visual estimate at 35-40%. Wall motion is abnormal. The left ventricular cavity size is normal. Abnormal (paradoxical) septal motion consistent with post-operative status. Spectral Doppler shows a normal pattern of left ventricular diastolic filling. There is no definite left ventricular thrombus visualized. Technically difficult apical views, espically with foreshortening of the 2 chamber views. Overall appears to have deuced LV systolic dysfunction with apical hypokinesis with LVEF approx 38%.  Left Atrium: The left atrium is normal in size.  Right Ventricle: The right ventricle is normal in size. There is mildly reduced right ventricular systolic function.  Right Atrium: The right atrium is mildly dilated.  Aortic Valve: The aortic valve is trileaflet. The aortic valve dimensionless index is 0.61. There is no evidence of aortic valve regurgitation. The peak instantaneous gradient of the aortic valve is 2.6 mmHg. The mean gradient of the aortic valve is 1.0 mmHg.  Mitral Valve: The mitral valve is normal in structure. There is trace mitral valve regurgitation.  Tricuspid Valve: The tricuspid valve is structurally normal. There is trace tricuspid regurgitation. The Doppler estimated RVSP is within normal limits at 22.5 mmHg.  Pulmonic Valve: The pulmonic valve is not well visualized. There is physiologic pulmonic valve regurgitation.  Pericardium: There is no pericardial effusion noted.  Aorta: The aortic root is normal. The aortic root is at the upper limits of  normal size.  Systemic Veins: The inferior vena cava appears to be of normal size.  In comparison to the previous echocardiogram(s): Compared with study dated 11/13/2023,. Compared with the prior exams from 11/8/2023 and 11/13/2023 ( both preop from CABG) there is similar LV systolic function with a similar apical wall motion abnormality ( apical hypokinesis. ).        CONCLUSIONS:   1. Left ventricular ejection fraction is moderately decreased, by visual estimate at 35-40%.   2. Abnormal septal motion consistent with post-operative status.   3. No left ventricular thrombus visualized.   4. Technically difficult apical views, espically with foreshortening of the 2 chamber views. Overall appears to have deuced LV systolic dysfunction with apical hypokinesis with LVEF approx 38%.   5. There is mildly reduced right ventricular systolic function.   6. RVSP within normal limits.   7. Compared with the prior exams from 11/8/2023 and 11/13/2023 ( both preop from CABG) there is similar LV systolic function with a similar apical wall motion abnormality ( apical hypokinesis. ).        Cardiac MRI 9/5/2024  IMPRESSION:  1. Mildly dilated LV (EDVi 104 ml/m2) with mildly impaired systolic  function (LVEF 47%).  2. The mid anteroseptal and apical anterior/septal segments are  severely hypokinetic.  3. Moderate left atrial dilatation.  4. Near transmural infarction of the mid anteroseptal and apical  anterior/septal segments (LAD territory) on LGE imaging. The affected  segments are non-viable.  5. Normal RV size (EDVi 86 ml/m2) and systolic function (RVEF 53%).      The CMR findings are consistent with ischemic cardiomyopathy.           Current Outpatient Medications   Medication Instructions    aspirin 81 mg, Daily    b complex 0.4 mg tablet 1 tablet, Daily    cholecalciferol (VITAMIN D3) 50 mcg, Daily    clindamycin (Cleocin T) 1 % lotion Topical, 2 times daily    ferrous sulfate 65 mg, Every other day    metoprolol succinate  XL (TOPROL-XL) 12.5 mg, oral, Daily, Do not crush or chew.    rosuvastatin (CRESTOR) 20 mg, oral, Daily     Impressions and Plan:    Pt is a 69 year old man found to have high CAC and severe 2 vessel CAD who is now s/p CA BG ( LIMA_LAD, RA-PDA) 1/4/2024 who returns for follow up.   He is doing quite well and is active without any cardiac sx and is tolerating his statin ( rosuvastatin 20 mg ) well without myalgias. He is at target on this dose.  His LVEF prior to surgery was 35-40% and upon  recheck echo showed stable LVEF following revascularization. Still with LVEF 35-40%. Cardiac MRI demonstrated LVEF 47% ( somewhat improved) and not at a level requiring ICD. Did not tolerate small dose of losartan in the past. Does have occasional postural dizziness if gets up quickly. Exercising a great deal and feels he likely does not have enough PO fluid intake. Will continue his metoprolol succinate ( only 12.5 mg daily ). Somewhat bradycardic but possibly from fitness level. If fully hydrated no dizziness.  Discussed being sure he stays hydrated and may consider support socks given his orthostatic sx  Plan  1. CAD- s/p CABG- continue aspirin and metoprolol and statin   2. Persistently reduced LVEF from CAD- somewhat improved by MRI. Not able to tolerate the smallest dose of losartan previously and already with some orthostatic sx on smallest dose of metoprolol. Pt to try to increase intake of fluids, may consider adding farxiga in the future, but will hold off for now.   3. Dyslipidemia- Target LDL < 55. At target on current dose of rosuvastatin 20 mg daily . Due for lipid recheck in August  4. Continue current level of exercise.   Return to clinic in 6 months.         Patient Instructions:  If you have any questions or need cardiac medication refills, please call my office at [phone],      To reach my office please call [phone]  To schedule an appointment call [phone].           ____________________________________________________________  Michelle Jimenez MD  Division of Cardiovascular Medicine  [facility]          [1]   Patient Active Problem List  Diagnosis    Focal lymphocytic colitis    Celiac disease (HHS-HCC)    Anisometropia    Benign positional vertigo    Contact dermatitis due to poison ivy    ED (erectile dysfunction) of organic origin    Lactose intolerance    Benign neoplasm of skin of right lower extremity    Melanocytic nevus of trunk    Nuclear sclerotic cataract    Melanocytic nevus of left lower extremity    Other melanin hyperpigmentation    Actinic keratosis    Skin changes due to chronic exposure to nonionizing radiation, unspecified    Pseudophakia of right eye    Vertigo    Vitamin D deficiency    Melanocytic nevi of right upper limb, including shoulder    Melanocytic nevus of right upper extremity    Melanocytic nevi of right lower limb, including hip    Melanocytic nevi of left lower limb, including hip    Atherosclerosis of coronary artery    LV dysfunction    Neoplasm of uncertain behavior of skin    Dyslipidemia    Colitis    Ascending aorta dilatation    Systolic heart failure, unspecified HF chronicity    S/P CABG (coronary artery bypass graft)   [2]   Past Medical History:  Diagnosis Date    Acute upper respiratory infection 01/29/2024    Cataract     Celiac disease (HHS-HCC)     Colitis     Coronary artery disease 12/04/2023    Severe 2 vessel CAD in a right dominant system.  Elevated LVEDP.  No evidence of aortic stenosis.    Diarrhea 01/29/2024    Hyperlipidemia     Lymphocytic colitis     Other male erectile dysfunction 02/06/2023    Vertigo 10/11/2023   [3]   Past Surgical History:  Procedure Laterality Date    CARDIAC CATHETERIZATION N/A 12/04/2023    Procedure: Left Heart Cath, With LV;  Surgeon: Javier Ma MD;  Location: [facility];  Service: Cardiovascular;  Laterality: N/A;     LUMBAR SPINE SURGERY     [4]   Family History  Problem Relation Name Age of Onset    Alzheimer's disease Mother  80    Other (COVID) Father  90

## 2025-05-15 NOTE — PATIENT INSTRUCTIONS
CAD, s/p CABG- continue aspirin metoprolol and rosuvastatin 20 mg daily. LVEF improved on recent MRI to 47%. No indication at this time for ICD.  Dyslipidemia- continue rosuvastatin 20 mg daily with LDL target < 55. Was at target August 2024.   Continue current  level of exercise.  Dizziness upon getting up- please be sure to remain hydrated and consider support socks.  Return to clinic in 6 months.Will check lipids and complete metabolic in August 2025.

## 2025-05-16 ASSESSMENT — ENCOUNTER SYMPTOMS
DEPRESSION: 0
LOSS OF SENSATION IN FEET: 0
OCCASIONAL FEELINGS OF UNSTEADINESS: 0

## 2025-08-15 DIAGNOSIS — E78.5 DYSLIPIDEMIA: ICD-10-CM

## 2025-08-15 DIAGNOSIS — Z95.1 S/P CABG X 2: ICD-10-CM

## 2025-08-20 LAB
ALBUMIN SERPL-MCNC: 4.5 G/DL (ref 3.6–5.1)
ALP SERPL-CCNC: 45 U/L (ref 35–144)
ALT SERPL-CCNC: 24 U/L (ref 9–46)
ANION GAP SERPL CALCULATED.4IONS-SCNC: 8 MMOL/L (CALC) (ref 7–17)
AST SERPL-CCNC: 34 U/L (ref 10–35)
BASOPHILS # BLD AUTO: 28 CELLS/UL (ref 0–200)
BASOPHILS NFR BLD AUTO: 0.6 %
BILIRUB SERPL-MCNC: 0.8 MG/DL (ref 0.2–1.2)
BUN SERPL-MCNC: 19 MG/DL (ref 7–25)
CALCIUM SERPL-MCNC: 9.5 MG/DL (ref 8.6–10.3)
CHLORIDE SERPL-SCNC: 104 MMOL/L (ref 98–110)
CHOLEST SERPL-MCNC: 154 MG/DL
CHOLEST/HDLC SERPL: 2.1 (CALC)
CO2 SERPL-SCNC: 28 MMOL/L (ref 20–32)
CREAT SERPL-MCNC: 0.99 MG/DL (ref 0.7–1.35)
EGFRCR SERPLBLD CKD-EPI 2021: 83 ML/MIN/1.73M2
EOSINOPHIL # BLD AUTO: 110 CELLS/UL (ref 15–500)
EOSINOPHIL NFR BLD AUTO: 2.4 %
ERYTHROCYTE [DISTWIDTH] IN BLOOD BY AUTOMATED COUNT: 13 % (ref 11–15)
FERRITIN SERPL-MCNC: 116 NG/ML (ref 24–380)
GLUCOSE SERPL-MCNC: 92 MG/DL (ref 65–99)
HCT VFR BLD AUTO: 37.7 % (ref 38.5–50)
HDLC SERPL-MCNC: 72 MG/DL
HGB BLD-MCNC: 12.2 G/DL (ref 13.2–17.1)
IRON SATN MFR SERPL: 19 % (CALC) (ref 20–48)
IRON SERPL-MCNC: 58 MCG/DL (ref 50–180)
LDLC SERPL CALC-MCNC: 68 MG/DL (CALC)
LYMPHOCYTES # BLD AUTO: 1780 CELLS/UL (ref 850–3900)
LYMPHOCYTES NFR BLD AUTO: 38.7 %
MCH RBC QN AUTO: 29.4 PG (ref 27–33)
MCHC RBC AUTO-ENTMCNC: 32.4 G/DL (ref 32–36)
MCV RBC AUTO: 90.8 FL (ref 80–100)
MONOCYTES # BLD AUTO: 368 CELLS/UL (ref 200–950)
MONOCYTES NFR BLD AUTO: 8 %
NEUTROPHILS # BLD AUTO: 2314 CELLS/UL (ref 1500–7800)
NEUTROPHILS NFR BLD AUTO: 50.3 %
NONHDLC SERPL-MCNC: 82 MG/DL (CALC)
PLATELET # BLD AUTO: 213 THOUSAND/UL (ref 140–400)
PMV BLD REES-ECKER: 10.6 FL (ref 7.5–12.5)
POTASSIUM SERPL-SCNC: 4.3 MMOL/L (ref 3.5–5.3)
PROT SERPL-MCNC: 7.1 G/DL (ref 6.1–8.1)
RBC # BLD AUTO: 4.15 MILLION/UL (ref 4.2–5.8)
RETICS #: NORMAL CELLS/UL (ref 25000–90000)
RETICS/RBC NFR AUTO: 1 %
SODIUM SERPL-SCNC: 140 MMOL/L (ref 135–146)
TIBC SERPL-MCNC: 312 MCG/DL (CALC) (ref 250–425)
TRIGL SERPL-MCNC: 61 MG/DL
WBC # BLD AUTO: 4.6 THOUSAND/UL (ref 3.8–10.8)

## 2025-08-27 ENCOUNTER — APPOINTMENT (OUTPATIENT)
Dept: PRIMARY CARE | Facility: CLINIC | Age: 70
End: 2025-08-27
Payer: COMMERCIAL

## 2025-08-27 VITALS
TEMPERATURE: 97.8 F | DIASTOLIC BLOOD PRESSURE: 67 MMHG | HEIGHT: 70 IN | HEART RATE: 59 BPM | OXYGEN SATURATION: 98 % | BODY MASS INDEX: 26.77 KG/M2 | SYSTOLIC BLOOD PRESSURE: 110 MMHG | WEIGHT: 187 LBS

## 2025-08-27 DIAGNOSIS — I25.10 ATHEROSCLEROSIS OF NATIVE CORONARY ARTERY OF NATIVE HEART WITHOUT ANGINA PECTORIS: ICD-10-CM

## 2025-08-27 DIAGNOSIS — Z12.5 PROSTATE CANCER SCREENING: ICD-10-CM

## 2025-08-27 DIAGNOSIS — D50.9 IRON DEFICIENCY ANEMIA, UNSPECIFIED IRON DEFICIENCY ANEMIA TYPE: ICD-10-CM

## 2025-08-27 DIAGNOSIS — I50.20 SYSTOLIC HEART FAILURE, UNSPECIFIED HF CHRONICITY: ICD-10-CM

## 2025-08-27 DIAGNOSIS — Z00.00 ROUTINE GENERAL MEDICAL EXAMINATION AT HEALTH CARE FACILITY: Primary | ICD-10-CM

## 2025-08-27 DIAGNOSIS — Z00.00 ENCOUNTER FOR PREVENTATIVE ADULT HEALTH CARE EXAMINATION: ICD-10-CM

## 2025-08-27 DIAGNOSIS — T75.3XXS MOTION SICKNESS, SEQUELA: ICD-10-CM

## 2025-08-27 DIAGNOSIS — K90.0 CELIAC DISEASE (HHS-HCC): ICD-10-CM

## 2025-08-27 PROCEDURE — 1123F ACP DISCUSS/DSCN MKR DOCD: CPT | Performed by: INTERNAL MEDICINE

## 2025-08-27 PROCEDURE — 1159F MED LIST DOCD IN RCRD: CPT | Performed by: INTERNAL MEDICINE

## 2025-08-27 PROCEDURE — 1036F TOBACCO NON-USER: CPT | Performed by: INTERNAL MEDICINE

## 2025-08-27 PROCEDURE — 1158F ADVNC CARE PLAN TLK DOCD: CPT | Performed by: INTERNAL MEDICINE

## 2025-08-27 PROCEDURE — 1126F AMNT PAIN NOTED NONE PRSNT: CPT | Performed by: INTERNAL MEDICINE

## 2025-08-27 PROCEDURE — G2211 COMPLEX E/M VISIT ADD ON: HCPCS | Performed by: INTERNAL MEDICINE

## 2025-08-27 PROCEDURE — 99214 OFFICE O/P EST MOD 30 MIN: CPT | Performed by: INTERNAL MEDICINE

## 2025-08-27 PROCEDURE — 1160F RVW MEDS BY RX/DR IN RCRD: CPT | Performed by: INTERNAL MEDICINE

## 2025-08-27 PROCEDURE — G0402 INITIAL PREVENTIVE EXAM: HCPCS | Performed by: INTERNAL MEDICINE

## 2025-08-27 PROCEDURE — 1170F FXNL STATUS ASSESSED: CPT | Performed by: INTERNAL MEDICINE

## 2025-08-27 PROCEDURE — 3008F BODY MASS INDEX DOCD: CPT | Performed by: INTERNAL MEDICINE

## 2025-08-27 ASSESSMENT — ACTIVITIES OF DAILY LIVING (ADL)
BATHING: INDEPENDENT
TAKING_MEDICATION: INDEPENDENT
MANAGING_FINANCES: INDEPENDENT
GROCERY_SHOPPING: INDEPENDENT
DOING_HOUSEWORK: INDEPENDENT
DRESSING: INDEPENDENT

## 2025-08-27 ASSESSMENT — ENCOUNTER SYMPTOMS
LOSS OF SENSATION IN FEET: 0
OCCASIONAL FEELINGS OF UNSTEADINESS: 0
DEPRESSION: 0

## 2025-08-27 ASSESSMENT — PAIN SCALES - GENERAL: PAINLEVEL_OUTOF10: 0-NO PAIN

## 2025-11-17 ENCOUNTER — APPOINTMENT (OUTPATIENT)
Dept: DERMATOLOGY | Facility: CLINIC | Age: 70
End: 2025-11-17
Payer: COMMERCIAL

## 2026-09-01 ENCOUNTER — APPOINTMENT (OUTPATIENT)
Dept: PRIMARY CARE | Facility: CLINIC | Age: 71
End: 2026-09-01
Payer: MEDICARE

## (undated) DEVICE — CASSETTE, BLOOD, PLEGIC SET

## (undated) DEVICE — GOWN, ASTOUND, L

## (undated) DEVICE — DRESSING, ADHESIVE, ISLAND, TELFA, 4 X 14 IN

## (undated) DEVICE — DRESSING, ISLAND, TELFA, 4 X 5 IN

## (undated) DEVICE — TUBING, SUCTION, CARDIAC, 6 FR

## (undated) DEVICE — TUBE SET, PNEUMOLAR HEATED, SMOKE EVACU, HIGH-FLOW

## (undated) DEVICE — MONITORING KIT, TRANSDUCER, RETROGRADE, MPS, W/EXTENSION, LF

## (undated) DEVICE — FILTER, IV, BLOOD, MICROAGGREGATE, 40 MIC, RBC TRANSFUSION

## (undated) DEVICE — DRESSING, MEPILEX, BORDER, SACRUM, 8.7 X 9.8 IN

## (undated) DEVICE — SUTURE, ETHIBOND, XTRA, 30 IN, 0, CT-1, GREEN

## (undated) DEVICE — SUTURE, VICRYL, 4-0, 27 IN, KS, UNDYED

## (undated) DEVICE — SPONGE, LAP, XRAY DECT, 18IN X 18IN, W/MASTER DMT, STERILE

## (undated) DEVICE — CATHETER, DIAGNOSTIC, 4 FR-JR 4

## (undated) DEVICE — COVER, TABLE, UHC

## (undated) DEVICE — SUTURE, SILK, 4-0, 18 IN, LABYRINTH, BLACK

## (undated) DEVICE — SUTURE, SILK, 1, 30 IN, LABYRINTH, BLACK

## (undated) DEVICE — CLEANER, ELECTROSURGICAL, TIP, 5 X 5 CM, LF

## (undated) DEVICE — APPLICATOR, CHLORAPREP, W/ORANGE TINT, 26ML

## (undated) DEVICE — Device

## (undated) DEVICE — SYRINGE, 60 CC, IRRIGATION, BULB, CONTRO-BULB, PAPER POUCH

## (undated) DEVICE — PACING WIRE, 1/2 CIRCLE, 26MM NEEDLE, WHITE

## (undated) DEVICE — CANNULA, AORTIC, ROOT, 9GA

## (undated) DEVICE — CLIPPER, SURGICAL BLADE ASSEMBLY, GENERAL PURPOSE, SINGLE USE

## (undated) DEVICE — DRESSING, ADHESIVE, ISLAND, TELFA, 2 X 3.75 IN, LF

## (undated) DEVICE — CATHETER, DRAINAGE, NASOGASTRIC, DOUBLE LUMEN, FUNNEL END, SUMP, SALEM, 18 FR, 48 IN, PVC, STERILE

## (undated) DEVICE — SUTURE, PROLENE, 3-0, 36 IN, SH, DA, BLUE

## (undated) DEVICE — KIT, TOURNIQUET, 7"

## (undated) DEVICE — TUBE SET, PNEUMOCLEAR, SMOKE EVACU, HIGH-FLOW

## (undated) DEVICE — PERFUSION PACK, HEMOCONCENTRATOR, MINNTECH, W/TUBING

## (undated) DEVICE — GUIDEWIRE, INQWIRE, 3MM J, .035, 260

## (undated) DEVICE — KIT, CELL SAVER, W/COLLECTION SET, 225ML WASH SET

## (undated) DEVICE — SYRINGE, LUER LOCK, 12ML

## (undated) DEVICE — SUTURE, PROLENE, 6-0, 30 IN, C-1, CV-11, BLUE

## (undated) DEVICE — SYRINGE, VASOSHIELD, PRESSURE CONTROLLING

## (undated) DEVICE — CANNULA, VESSEL, FREE FLOW, BLUNT TIP, 3 MM X 5 CM

## (undated) DEVICE — CLIP, LIGATING, HORIZON, LARGE, TITANIUM

## (undated) DEVICE — SUTURE, PROLENE, 7-0, 30 IN, BV1, DA, BLUE

## (undated) DEVICE — SPONGE, HEMOSTATIC, CELLULOSE, SURGICEL, 2 X 14 IN

## (undated) DEVICE — CLIP, SPRING, BULLDOG, FOGARTY, SOFT JAW, 6 MM, LF

## (undated) DEVICE — SUTURE, PROLENE, 4-0, 36 IN, BB, BLUE

## (undated) DEVICE — GEL, ULTRASOUND, AQUASONIC 100, 20 GM, STERILE

## (undated) DEVICE — SUTURE, SURGICAL STEEL, STERNUM 7, 18 IN, KV40, SINGL-WIRE

## (undated) DEVICE — SYRINGE, 20 CC, LUER LOCK, MONOJECT, W/O CAP, LF

## (undated) DEVICE — BANDAGE, ELASTIC, ACE, ACE, DOUBLE LENGTH, 6 X 550 IN, LF

## (undated) DEVICE — BONE WAX, 3.5G ABSORBABLE, OSTENE

## (undated) DEVICE — CATHETER, THORACIC, STRAIGHT, SOFT, TAPER TIP, 32 FR

## (undated) DEVICE — CANNULA, EZ GLIDE 24FR

## (undated) DEVICE — SUTURE, VICRYL, 2-0, 27 IN, CT-1, VIOLET

## (undated) DEVICE — DRAPE, SHEET, CARDIOVASCULAR, ANTIMICROBIAL, W/ANESTHESIA SCREEN, IOBAN 2, STERI DRAPE, 107 X 133 IN, DISPOSABLE, FABRIC, BLUE, STERILE

## (undated) DEVICE — ADAPTER, Y, CORONARY PERFUSION

## (undated) DEVICE — SPONGE, GAUZE, XRAY DECT, 16 PLY, 4 X 4, W/MASTER DMT,STERILE

## (undated) DEVICE — SUTURE, ETHIBOND, XTRA, 30 IN, 0, CTX, GREEN

## (undated) DEVICE — MAYO TRAY, SMALL

## (undated) DEVICE — CLIP, LIGATING, W/ADHESIVE, WIDE SLOT, SMALL, TITANIUM

## (undated) DEVICE — TIP, SUCTION, YANKAUER, W/O VENT, FLEXIBLE, OPEN TIP, HIGH CAPACITY

## (undated) DEVICE — SHUNT, SENSOR

## (undated) DEVICE — SUTURE, VICRYL 0, TAPER POINT, CT-1 VIOLET 27 INCH

## (undated) DEVICE — CANNULA, CARDIAC SUMP

## (undated) DEVICE — OXYGENATOR FX 25, W/HR, ARTERIAL FILTER

## (undated) DEVICE — DRAPE, INSTRUMENT, W/POUCH, STERI DRAPE, 7 X 11 IN, DISPOSABLE, STERILE

## (undated) DEVICE — FOOT SWITCH, PENCIL, W/HOLSTER, LONGER CORD

## (undated) DEVICE — SUTURE, SILK, 2-0, 30 IN, SH, CONTROL RELEASE, MULTIPACK, BLACK

## (undated) DEVICE — CATHETER, THORACIC, STRAIGHT, ADULT, 28 FR, PVC

## (undated) DEVICE — DRESSING, MEPILEX, BORDER, HEEL, 8.7 X 9.1 IN

## (undated) DEVICE — TUBING, SUCTION, CONNECTING, STERILE 0.25 X 120 IN., LF

## (undated) DEVICE — PLEDGET, PTFE, SOFT, LARGE, 3/8 X 3/16 X 1/16 IN

## (undated) DEVICE — CLIP, LIGATING, W/ADHESIVE PAD, MEDIUM, TITANIUM

## (undated) DEVICE — TUBING PACK, OXYGENATOR, ADULT

## (undated) DEVICE — MANIFOLD, 4 PORT NEPTUNE STANDARD

## (undated) DEVICE — CONNECTOR, STRAIGHT, 0.5 X 0.5 IN

## (undated) DEVICE — BAND, VASCULAR, RADIAL HEMOSTAT, REGULAR 24CM

## (undated) DEVICE — SUTURE, PROLENE, 4-0, TAPER POINT, SH/SH BLUE 36IN

## (undated) DEVICE — PERFUSION SERVICES

## (undated) DEVICE — MARKER, SKIN, DUAL TIP INK W/9 LABEL AND REMOVABLE TIME OUT SLEEVE

## (undated) DEVICE — TRAY, MINOR, SINGLE BASIN, STERILE

## (undated) DEVICE — INTRODUCER, GLIDESHEATH SLENDER A-KIT, 6FR 10CM

## (undated) DEVICE — COVER, CART, 45 X 27 X 48 IN, CLEAR

## (undated) DEVICE — MICROCOAGULATION TEST, ACT+ TEST CUVETTE

## (undated) DEVICE — CATHETER, ANGIO, IMPULSE, FL3.5, 5 FR X 100 CM

## (undated) DEVICE — CANNULA, VENOUS, 2-STAGE, 32/40 ROUND